# Patient Record
Sex: FEMALE | Race: WHITE | Employment: FULL TIME | URBAN - METROPOLITAN AREA
[De-identification: names, ages, dates, MRNs, and addresses within clinical notes are randomized per-mention and may not be internally consistent; named-entity substitution may affect disease eponyms.]

---

## 2017-02-17 RX ORDER — CETIRIZINE HYDROCHLORIDE 10 MG/1
TABLET ORAL
Qty: 30 TABLET | Refills: 3 | Status: SHIPPED | OUTPATIENT
Start: 2017-02-17 | End: 2017-09-15

## 2017-02-17 NOTE — TELEPHONE ENCOUNTER
Rx approved per  protocol. Chart reviewed. Rx sent to the pharmacy.       Refill Protocol Appointment Criteria  · Appointment scheduled in the past 12 months or in the next 3 months  Recent Visits       Provider Department Primary Dx    5 months ago Leslie

## 2017-04-01 RX ORDER — BUPROPION HYDROCHLORIDE 150 MG/1
TABLET, EXTENDED RELEASE ORAL
Qty: 60 TABLET | Refills: 0 | Status: SHIPPED | OUTPATIENT
Start: 2017-04-01 | End: 2017-07-03

## 2017-04-01 NOTE — TELEPHONE ENCOUNTER
Refill Protocol Appointment Criteria  · Appointment scheduled in the past 6 months or in the next 3 months  Recent Visits       Provider Department Primary Dx    6 months ago Luis M Bernal [place], [address] [facility], [place] [address], [facility] ex

## 2017-06-09 ENCOUNTER — TELEPHONE (OUTPATIENT)
Dept: FAMILY MEDICINE CLINIC | Facility: CLINIC | Age: 57
End: 2017-06-09

## 2017-06-09 DIAGNOSIS — Z00.00 ADULT GENERAL MEDICAL EXAM: Primary | ICD-10-CM

## 2017-06-09 DIAGNOSIS — R73.9 HYPERGLYCEMIA: ICD-10-CM

## 2017-06-09 NOTE — TELEPHONE ENCOUNTER
Patient called and stated requesting an order for blood work  Patient is scheduled on 7/3/17 w/ for fu appt

## 2017-07-03 ENCOUNTER — OFFICE VISIT (OUTPATIENT)
Dept: FAMILY MEDICINE CLINIC | Facility: CLINIC | Age: 57
End: 2017-07-03

## 2017-07-03 ENCOUNTER — LAB ENCOUNTER (OUTPATIENT)
Dept: LAB | Age: 57
End: 2017-07-03
Attending: FAMILY MEDICINE
Payer: COMMERCIAL

## 2017-07-03 ENCOUNTER — APPOINTMENT (OUTPATIENT)
Dept: LAB | Age: 57
End: 2017-07-03
Attending: FAMILY MEDICINE
Payer: COMMERCIAL

## 2017-07-03 VITALS
RESPIRATION RATE: 14 BRPM | TEMPERATURE: 100 F | DIASTOLIC BLOOD PRESSURE: 82 MMHG | HEIGHT: 69.5 IN | SYSTOLIC BLOOD PRESSURE: 122 MMHG | BODY MASS INDEX: 35.76 KG/M2 | WEIGHT: 247 LBS | HEART RATE: 87 BPM

## 2017-07-03 DIAGNOSIS — R73.9 HYPERGLYCEMIA: ICD-10-CM

## 2017-07-03 DIAGNOSIS — K62.89 RECTAL PAIN: ICD-10-CM

## 2017-07-03 DIAGNOSIS — G47.00 INSOMNIA, UNSPECIFIED TYPE: ICD-10-CM

## 2017-07-03 DIAGNOSIS — K64.4 EXTERNAL HEMORRHOID, BLEEDING: ICD-10-CM

## 2017-07-03 DIAGNOSIS — E66.01 SEVERE OBESITY (BMI 35.0-35.9 WITH COMORBIDITY) (HCC): ICD-10-CM

## 2017-07-03 DIAGNOSIS — E78.2 MIXED HYPERLIPIDEMIA: Primary | ICD-10-CM

## 2017-07-03 DIAGNOSIS — E78.2 MIXED HYPERLIPIDEMIA: ICD-10-CM

## 2017-07-03 DIAGNOSIS — F41.9 ANXIETY: ICD-10-CM

## 2017-07-03 DIAGNOSIS — Z00.00 ADULT GENERAL MEDICAL EXAM: ICD-10-CM

## 2017-07-03 LAB
ALBUMIN SERPL BCP-MCNC: 4.1 G/DL (ref 3.5–4.8)
ALBUMIN/GLOB SERPL: 1.5 {RATIO} (ref 1–2)
ALP SERPL-CCNC: 67 U/L (ref 32–100)
ALT SERPL-CCNC: 39 U/L (ref 14–54)
ANION GAP SERPL CALC-SCNC: 9 MMOL/L (ref 0–18)
AST SERPL-CCNC: 34 U/L (ref 15–41)
BASOPHILS # BLD: 0.1 K/UL (ref 0–0.2)
BASOPHILS NFR BLD: 1 %
BILIRUB SERPL-MCNC: 0.6 MG/DL (ref 0.3–1.2)
BUN SERPL-MCNC: 18 MG/DL (ref 8–20)
BUN/CREAT SERPL: 15.4 (ref 10–20)
CALCIUM SERPL-MCNC: 9.6 MG/DL (ref 8.5–10.5)
CHLORIDE SERPL-SCNC: 105 MMOL/L (ref 95–110)
CHOLEST SERPL-MCNC: 193 MG/DL (ref 110–200)
CO2 SERPL-SCNC: 30 MMOL/L (ref 22–32)
CREAT SERPL-MCNC: 1.17 MG/DL (ref 0.5–1.5)
EOSINOPHIL # BLD: 0.2 K/UL (ref 0–0.7)
EOSINOPHIL NFR BLD: 2 %
ERYTHROCYTE [DISTWIDTH] IN BLOOD BY AUTOMATED COUNT: 13.5 % (ref 11–15)
GLOBULIN PLAS-MCNC: 2.7 G/DL (ref 2.5–3.7)
GLUCOSE SERPL-MCNC: 121 MG/DL (ref 70–99)
HBA1C MFR BLD: 6.5 % (ref 4–6)
HCT VFR BLD AUTO: 42.4 % (ref 35–48)
HDLC SERPL-MCNC: 46 MG/DL
HGB BLD-MCNC: 14.7 G/DL (ref 12–16)
LDLC SERPL CALC-MCNC: 105 MG/DL (ref 0–99)
LYMPHOCYTES # BLD: 3.2 K/UL (ref 1–4)
LYMPHOCYTES NFR BLD: 33 %
MCH RBC QN AUTO: 33.2 PG (ref 27–32)
MCHC RBC AUTO-ENTMCNC: 34.7 G/DL (ref 32–37)
MCV RBC AUTO: 95.6 FL (ref 80–100)
MONOCYTES # BLD: 0.5 K/UL (ref 0–1)
MONOCYTES NFR BLD: 5 %
NEUTROPHILS # BLD AUTO: 5.6 K/UL (ref 1.8–7.7)
NEUTROPHILS NFR BLD: 59 %
NONHDLC SERPL-MCNC: 147 MG/DL
OSMOLALITY UR CALC.SUM OF ELEC: 301 MOSM/KG (ref 275–295)
PLATELET # BLD AUTO: 206 K/UL (ref 140–400)
PMV BLD AUTO: 9.5 FL (ref 7.4–10.3)
POTASSIUM SERPL-SCNC: 5.1 MMOL/L (ref 3.3–5.1)
PROT SERPL-MCNC: 6.8 G/DL (ref 5.9–8.4)
RBC # BLD AUTO: 4.44 M/UL (ref 3.7–5.4)
SODIUM SERPL-SCNC: 144 MMOL/L (ref 136–144)
TRIGL SERPL-MCNC: 209 MG/DL (ref 1–149)
TSH SERPL-ACNC: 1.13 UIU/ML (ref 0.45–5.33)
WBC # BLD AUTO: 9.6 K/UL (ref 4–11)

## 2017-07-03 PROCEDURE — 93010 ELECTROCARDIOGRAM REPORT: CPT | Performed by: FAMILY MEDICINE

## 2017-07-03 PROCEDURE — 80061 LIPID PANEL: CPT

## 2017-07-03 PROCEDURE — 84443 ASSAY THYROID STIM HORMONE: CPT

## 2017-07-03 PROCEDURE — 85025 COMPLETE CBC W/AUTO DIFF WBC: CPT

## 2017-07-03 PROCEDURE — 99212 OFFICE O/P EST SF 10 MIN: CPT | Performed by: FAMILY MEDICINE

## 2017-07-03 PROCEDURE — 93005 ELECTROCARDIOGRAM TRACING: CPT

## 2017-07-03 PROCEDURE — 36415 COLL VENOUS BLD VENIPUNCTURE: CPT

## 2017-07-03 PROCEDURE — 83036 HEMOGLOBIN GLYCOSYLATED A1C: CPT

## 2017-07-03 PROCEDURE — 99214 OFFICE O/P EST MOD 30 MIN: CPT | Performed by: FAMILY MEDICINE

## 2017-07-03 PROCEDURE — 80053 COMPREHEN METABOLIC PANEL: CPT

## 2017-07-03 RX ORDER — PHENTERMINE HYDROCHLORIDE 15 MG/1
15 CAPSULE ORAL EVERY MORNING
Qty: 30 CAPSULE | Refills: 1 | Status: SHIPPED | OUTPATIENT
Start: 2017-07-03 | End: 2017-08-14

## 2017-07-03 NOTE — PROGRESS NOTES
Patient ID: Teresa Duron is a 56year old female. HPI  Patient presents with:  Hyperlipidemia   she did her labs this morning. She did have elevated sugar. She does not think she eats that much but she has not lost any weight.   She states she has 2 GLAND/CYST  No date: HEMORRHOIDECTOMY  No date:       Comment: at 35 wks         Current Outpatient Prescriptions:  CETIRIZINE 10 MG Oral Tab TAKE 1 TABLET BY MOUTH ONCE DAILY Disp: 30 tablet Rfl: 3   Atorvastatin Calcium 80 MG Oral Tab TAKE 1 TABLET B clearly needs some help. If her EKG is normal she will start phentermine, but not before.   Insomnia, unspecified type  Defers taking medicines for the insomnia or anxiety   Anxiety    External hemorrhoid, bleeding  -     Hydrocortisone Acetate (ANUSOL-HC)

## 2017-07-07 ENCOUNTER — TELEPHONE (OUTPATIENT)
Dept: INTERNAL MEDICINE CLINIC | Facility: CLINIC | Age: 57
End: 2017-07-07

## 2017-08-14 ENCOUNTER — OFFICE VISIT (OUTPATIENT)
Dept: FAMILY MEDICINE CLINIC | Facility: CLINIC | Age: 57
End: 2017-08-14

## 2017-08-14 ENCOUNTER — TELEPHONE (OUTPATIENT)
Dept: FAMILY MEDICINE CLINIC | Facility: CLINIC | Age: 57
End: 2017-08-14

## 2017-08-14 VITALS
HEIGHT: 69.5 IN | HEART RATE: 86 BPM | DIASTOLIC BLOOD PRESSURE: 66 MMHG | TEMPERATURE: 100 F | SYSTOLIC BLOOD PRESSURE: 119 MMHG | WEIGHT: 243 LBS | BODY MASS INDEX: 35.18 KG/M2

## 2017-08-14 DIAGNOSIS — R30.0 DYSURIA: Primary | ICD-10-CM

## 2017-08-14 DIAGNOSIS — N30.00 ACUTE CYSTITIS WITHOUT HEMATURIA: ICD-10-CM

## 2017-08-14 LAB
MULTISTIX LOT#: ABNORMAL NUMERIC
PH, URINE: 5 (ref 4.5–8)
SPECIFIC GRAVITY: 1.01 (ref 1–1.03)
URINE-COLOR: YELLOW

## 2017-08-14 PROCEDURE — 81003 URINALYSIS AUTO W/O SCOPE: CPT | Performed by: FAMILY MEDICINE

## 2017-08-14 PROCEDURE — 99213 OFFICE O/P EST LOW 20 MIN: CPT | Performed by: FAMILY MEDICINE

## 2017-08-14 RX ORDER — CIPROFLOXACIN 500 MG/1
500 TABLET, FILM COATED ORAL 2 TIMES DAILY
Qty: 10 TABLET | Refills: 0 | Status: SHIPPED | OUTPATIENT
Start: 2017-08-14 | End: 2017-08-19

## 2017-08-14 RX ORDER — HYDROCORTISONE ACETATE 25 MG
SUPPOSITORY, RECTAL RECTAL
Refills: 1 | COMMUNITY
Start: 2017-07-29 | End: 2021-07-01

## 2017-08-14 NOTE — TELEPHONE ENCOUNTER
I have not seen her for urinary tract infection and we do see patients for this as we need to make sure that there is nothing else in the urine besides an infection. I work all the way to 7:30 PM today. Have her worked in with me today.

## 2017-08-14 NOTE — TELEPHONE ENCOUNTER
Actions Requested: requesting an antibiotic  Problem: urinary frequency  Onset and Timing: yesterday  Associated Symptoms: urinary urgency, bladder pressure, low back pain, burning/pain when urinating, small amount of urine coming out, bleeding hemorrhoid

## 2017-08-14 NOTE — PROGRESS NOTES
Patient ID: Celi Jewell is a 56year old female.     HPI  Patient presents with:  Painful Urination    Kinjal Negrete RN      []Manual[]Template  []Copied  Actions Requested: requesting an antibiotic  Problem: urinary frequency  Onset and Timing: EAB x 3   • Esophageal reflux    • Hemorrhoids     hemorrhoidectomy   • High cholesterol    • Skin disorder     \"chronic skin problems??\"   • Ulcer ([facility] [address]        Past Surgical History:  No date: BREAST SURGERY Right      Comment: Excisional biopsy-right -     Ciprofloxacin HCl (CIPRO) 500 MG Oral Tab; Take 1 tablet (500 mg total) by mouth 2 (two) times daily.  -     URINALYSIS, AUTO, W/O SCOPE  -     Cancel: URINE CULTURE, ROUTINE; Future  -     URINE CULTURE, ROUTINE  Urine culture sent.   Start ciproflox

## 2017-08-18 ENCOUNTER — TELEPHONE (OUTPATIENT)
Dept: FAMILY MEDICINE CLINIC | Facility: CLINIC | Age: 57
End: 2017-08-18

## 2017-08-18 NOTE — TELEPHONE ENCOUNTER
----- Message from Kianna Tavares DO sent at 8/16/2017  3:01 PM CDT -----  Urine Culture is positive for bacteria and the antibiotic given should work.

## 2017-09-15 DIAGNOSIS — K27.9 PUD (PEPTIC ULCER DISEASE): ICD-10-CM

## 2017-09-15 DIAGNOSIS — E78.2 MIXED HYPERLIPIDEMIA: ICD-10-CM

## 2017-09-20 RX ORDER — ATORVASTATIN CALCIUM 80 MG/1
TABLET, FILM COATED ORAL
Qty: 90 TABLET | Refills: 0 | Status: SHIPPED | OUTPATIENT
Start: 2017-09-20 | End: 2018-02-23

## 2017-09-20 RX ORDER — PANTOPRAZOLE SODIUM 40 MG/1
TABLET, DELAYED RELEASE ORAL
Qty: 90 TABLET | Refills: 0 | Status: SHIPPED | OUTPATIENT
Start: 2017-09-20 | End: 2018-02-23

## 2017-09-20 RX ORDER — CETIRIZINE HYDROCHLORIDE 10 MG/1
TABLET ORAL
Qty: 30 TABLET | Refills: 0 | Status: SHIPPED | OUTPATIENT
Start: 2017-09-20 | End: 2017-11-04

## 2017-09-20 NOTE — TELEPHONE ENCOUNTER
Cholesterol Medications  Protocol Criteria:  · Appointment scheduled in the past 12 months or in the next 3 months  · ALT & LDL on file in the past 12 months  · ALT result < 80  · LDL result <130   Recent Outpatient Visits            1 month ago Dysuria

## 2017-11-06 RX ORDER — CETIRIZINE HYDROCHLORIDE 10 MG/1
TABLET ORAL
Qty: 30 TABLET | Refills: 0 | Status: SHIPPED | OUTPATIENT
Start: 2017-11-06 | End: 2018-02-23

## 2017-11-06 NOTE — TELEPHONE ENCOUNTER
Signed Prescriptions Disp Refills    CETIRIZINE 10 MG Oral Tab 30 tablet 0      Sig: TAKE 1 TABLET BY MOUTH ONCE DAILY        Authorizing Provider: Mariano Campbell        Ordering User: Deedee Dawkins           Refill approved per protocol.

## 2017-11-06 NOTE — TELEPHONE ENCOUNTER
Refill Protocol Appointment Criteria  · Appointment scheduled in the past 12 months or in the next 3 months  Recent Outpatient Visits            2 months ago [address], [address], [place],     Office Visit    4 months ag

## 2018-02-07 ENCOUNTER — TELEPHONE (OUTPATIENT)
Dept: FAMILY MEDICINE CLINIC | Facility: CLINIC | Age: 58
End: 2018-02-07

## 2018-02-07 DIAGNOSIS — Z00.00 ADULT GENERAL MEDICAL EXAM: Primary | ICD-10-CM

## 2018-02-07 DIAGNOSIS — R73.9 HYPERGLYCEMIA: ICD-10-CM

## 2018-02-23 ENCOUNTER — LAB ENCOUNTER (OUTPATIENT)
Dept: LAB | Age: 58
End: 2018-02-23
Attending: FAMILY MEDICINE
Payer: COMMERCIAL

## 2018-02-23 ENCOUNTER — OFFICE VISIT (OUTPATIENT)
Dept: FAMILY MEDICINE CLINIC | Facility: CLINIC | Age: 58
End: 2018-02-23

## 2018-02-23 VITALS
TEMPERATURE: 99 F | SYSTOLIC BLOOD PRESSURE: 118 MMHG | HEART RATE: 76 BPM | WEIGHT: 243 LBS | DIASTOLIC BLOOD PRESSURE: 77 MMHG | BODY MASS INDEX: 36.83 KG/M2 | HEIGHT: 68 IN

## 2018-02-23 DIAGNOSIS — R73.9 HYPERGLYCEMIA: ICD-10-CM

## 2018-02-23 DIAGNOSIS — F32.A DEPRESSIVE DISORDER: ICD-10-CM

## 2018-02-23 DIAGNOSIS — Z00.00 ADULT GENERAL MEDICAL EXAM: Primary | ICD-10-CM

## 2018-02-23 DIAGNOSIS — R53.83 LETHARGY: ICD-10-CM

## 2018-02-23 DIAGNOSIS — G47.00 INSOMNIA, UNSPECIFIED TYPE: ICD-10-CM

## 2018-02-23 DIAGNOSIS — Z12.4 CERVICAL CANCER SCREENING: ICD-10-CM

## 2018-02-23 DIAGNOSIS — Z00.00 ADULT GENERAL MEDICAL EXAM: ICD-10-CM

## 2018-02-23 DIAGNOSIS — Z23 NEED FOR VACCINATION: ICD-10-CM

## 2018-02-23 DIAGNOSIS — J30.89 OTHER ALLERGIC RHINITIS: ICD-10-CM

## 2018-02-23 DIAGNOSIS — F17.200 TOBACCO USE DISORDER: ICD-10-CM

## 2018-02-23 DIAGNOSIS — F41.9 ANXIETY: ICD-10-CM

## 2018-02-23 DIAGNOSIS — Z12.31 VISIT FOR SCREENING MAMMOGRAM: ICD-10-CM

## 2018-02-23 DIAGNOSIS — K63.5 POLYP OF COLON, UNSPECIFIED PART OF COLON, UNSPECIFIED TYPE: ICD-10-CM

## 2018-02-23 DIAGNOSIS — E78.2 MIXED HYPERLIPIDEMIA: ICD-10-CM

## 2018-02-23 DIAGNOSIS — E66.01 SEVERE OBESITY (BMI 35.0-35.9 WITH COMORBIDITY) (HCC): ICD-10-CM

## 2018-02-23 DIAGNOSIS — K27.9 PUD (PEPTIC ULCER DISEASE): ICD-10-CM

## 2018-02-23 LAB
ALBUMIN SERPL BCP-MCNC: 3.9 G/DL (ref 3.5–4.8)
ALBUMIN/GLOB SERPL: 1.3 {RATIO} (ref 1–2)
ALP SERPL-CCNC: 64 U/L (ref 32–100)
ALT SERPL-CCNC: 43 U/L (ref 14–54)
ANION GAP SERPL CALC-SCNC: 9 MMOL/L (ref 0–18)
AST SERPL-CCNC: 36 U/L (ref 15–41)
BASOPHILS # BLD: 0.1 K/UL (ref 0–0.2)
BASOPHILS NFR BLD: 1 %
BILIRUB SERPL-MCNC: 0.7 MG/DL (ref 0.3–1.2)
BUN SERPL-MCNC: 16 MG/DL (ref 8–20)
BUN/CREAT SERPL: 14.2 (ref 10–20)
CALCIUM SERPL-MCNC: 9.7 MG/DL (ref 8.5–10.5)
CHLORIDE SERPL-SCNC: 104 MMOL/L (ref 95–110)
CHOLEST SERPL-MCNC: 175 MG/DL (ref 110–200)
CO2 SERPL-SCNC: 28 MMOL/L (ref 22–32)
CREAT SERPL-MCNC: 1.13 MG/DL (ref 0.5–1.5)
EOSINOPHIL # BLD: 0.3 K/UL (ref 0–0.7)
EOSINOPHIL NFR BLD: 3 %
ERYTHROCYTE [DISTWIDTH] IN BLOOD BY AUTOMATED COUNT: 13 % (ref 11–15)
GLOBULIN PLAS-MCNC: 2.9 G/DL (ref 2.5–3.7)
GLUCOSE SERPL-MCNC: 125 MG/DL (ref 70–99)
HBA1C MFR BLD: 6.5 % (ref 4–6)
HCT VFR BLD AUTO: 44.2 % (ref 35–48)
HDLC SERPL-MCNC: 41 MG/DL
HGB BLD-MCNC: 15.1 G/DL (ref 12–16)
LDLC SERPL CALC-MCNC: 101 MG/DL (ref 0–99)
LYMPHOCYTES # BLD: 3.3 K/UL (ref 1–4)
LYMPHOCYTES NFR BLD: 32 %
MCH RBC QN AUTO: 32.9 PG (ref 27–32)
MCHC RBC AUTO-ENTMCNC: 34.2 G/DL (ref 32–37)
MCV RBC AUTO: 96.2 FL (ref 80–100)
MONOCYTES # BLD: 0.6 K/UL (ref 0–1)
MONOCYTES NFR BLD: 6 %
NEUTROPHILS # BLD AUTO: 6.1 K/UL (ref 1.8–7.7)
NEUTROPHILS NFR BLD: 59 %
NONHDLC SERPL-MCNC: 134 MG/DL
OSMOLALITY UR CALC.SUM OF ELEC: 295 MOSM/KG (ref 275–295)
PATIENT FASTING: YES
PLATELET # BLD AUTO: 241 K/UL (ref 140–400)
PMV BLD AUTO: 9.7 FL (ref 7.4–10.3)
POTASSIUM SERPL-SCNC: 4.4 MMOL/L (ref 3.3–5.1)
PROT SERPL-MCNC: 6.8 G/DL (ref 5.9–8.4)
RBC # BLD AUTO: 4.6 M/UL (ref 3.7–5.4)
SODIUM SERPL-SCNC: 141 MMOL/L (ref 136–144)
TRIGL SERPL-MCNC: 166 MG/DL (ref 1–149)
TSH SERPL-ACNC: 1.68 UIU/ML (ref 0.45–5.33)
WBC # BLD AUTO: 10.3 K/UL (ref 4–11)

## 2018-02-23 PROCEDURE — 84443 ASSAY THYROID STIM HORMONE: CPT

## 2018-02-23 PROCEDURE — 99406 BEHAV CHNG SMOKING 3-10 MIN: CPT | Performed by: FAMILY MEDICINE

## 2018-02-23 PROCEDURE — 83036 HEMOGLOBIN GLYCOSYLATED A1C: CPT

## 2018-02-23 PROCEDURE — 80061 LIPID PANEL: CPT

## 2018-02-23 PROCEDURE — 80053 COMPREHEN METABOLIC PANEL: CPT

## 2018-02-23 PROCEDURE — 85025 COMPLETE CBC W/AUTO DIFF WBC: CPT

## 2018-02-23 PROCEDURE — 99214 OFFICE O/P EST MOD 30 MIN: CPT | Performed by: FAMILY MEDICINE

## 2018-02-23 PROCEDURE — 36415 COLL VENOUS BLD VENIPUNCTURE: CPT

## 2018-02-23 PROCEDURE — 99396 PREV VISIT EST AGE 40-64: CPT | Performed by: FAMILY MEDICINE

## 2018-02-23 RX ORDER — CETIRIZINE HYDROCHLORIDE 10 MG/1
10 TABLET ORAL
Qty: 90 TABLET | Refills: 1 | Status: SHIPPED | OUTPATIENT
Start: 2018-02-23 | End: 2018-03-20

## 2018-02-23 RX ORDER — ATORVASTATIN CALCIUM 80 MG/1
TABLET, FILM COATED ORAL
Qty: 90 TABLET | Refills: 1 | Status: SHIPPED | OUTPATIENT
Start: 2018-02-23 | End: 2018-03-20

## 2018-02-23 RX ORDER — BUPROPION HYDROCHLORIDE 150 MG/1
150 TABLET, EXTENDED RELEASE ORAL 2 TIMES DAILY
Qty: 60 TABLET | Refills: 3 | Status: SHIPPED | OUTPATIENT
Start: 2018-02-23 | End: 2018-03-19

## 2018-02-23 RX ORDER — PANTOPRAZOLE SODIUM 40 MG/1
TABLET, DELAYED RELEASE ORAL
Qty: 90 TABLET | Refills: 1 | Status: SHIPPED | OUTPATIENT
Start: 2018-02-23 | End: 2018-03-20

## 2018-02-23 RX ORDER — ESCITALOPRAM OXALATE 10 MG/1
10 TABLET ORAL DAILY
Qty: 90 TABLET | Refills: 0 | Status: SHIPPED | OUTPATIENT
Start: 2018-02-23 | End: 2018-05-22

## 2018-02-23 NOTE — PROGRESS NOTES
Patient ID: Guadalupe Daniels is a 66year old female. HPI  Patient presents with:  Routine Physical    She had her labs already done today. She does have a stressful life and states she is a single mom.   She works 2 jobs and works more than 60 hours per Results  Component Value Date    (H) 07/03/2017   BUN 18 07/03/2017   BUNCREA 15.4 07/03/2017   CREATSERUM 1.17 07/03/2017   ANIONGAP 9 07/03/2017   GFRNAA 48 (L) 07/03/2017   GFRAA 58 (L) 07/03/2017   CA 9.6 07/03/2017   OSMOCALC 301 (H) 07/03/2017 ----------  TSH (S) (uIU/mL)   Date Value   09/06/2016 1.84   ----------    No results found for: B12, VITB12    No results found for: IRON, IRONTOT    No results found for: SAT    No results found for: IRON, IRONTOT, TIBC, IRONSAT, TRANSFERRIN, TIBCP, I Currently     Other Topics Concern    Pt has a pacemaker No    Reaction to local anesthetic No    Caffeine Concern No     Social History Narrative   None on file            Current Outpatient Prescriptions:  Glucosamine HCl (GLUCOSAMINE OR) Take by mouth. has normal reflexes. No cranial nerve deficit. Skin: Skin is warm and dry. No rash noted. Psychiatric: She has a anxious affect but no depression or suicidal ideations at this time. No hallucinations. No edema the legs. Vitals reviewed.     Blood pre again as it did work for her in the past.  I did discuss tobacco cessation for about 3 minutes. Depressive disorder  -     escitalopram 10 MG Oral Tab; Take 1 tablet (10 mg total) by mouth daily. For mood    Lethargy  -     escitalopram 10 MG Oral Tab;  Ta

## 2018-02-27 ENCOUNTER — TELEPHONE (OUTPATIENT)
Dept: OTHER | Age: 58
End: 2018-02-27

## 2018-02-27 NOTE — TELEPHONE ENCOUNTER
Patient informed of results, and given MD's recommendations. Patient verbalized understanding with intent to comply. Questions answered.     Patient states she tries to decrease carbs, Reviewed diet modification such as reducing carbohydrates, fatty foods a

## 2018-02-27 NOTE — TELEPHONE ENCOUNTER
----- Message from Kimberly Altamirano DO sent at 2/25/2018  3:24 PM CST -----  Hemoglobin A1c is at 6.5 so you are borderline diabetic. Your cholesterol is also a bit high due to the diabetes then.   Kidney function, liver function, thyroid are normal.  No ane

## 2018-03-19 ENCOUNTER — OFFICE VISIT (OUTPATIENT)
Dept: OBGYN CLINIC | Facility: CLINIC | Age: 58
End: 2018-03-19

## 2018-03-19 VITALS
SYSTOLIC BLOOD PRESSURE: 123 MMHG | DIASTOLIC BLOOD PRESSURE: 76 MMHG | BODY MASS INDEX: 37.85 KG/M2 | HEART RATE: 90 BPM | HEIGHT: 67.5 IN | WEIGHT: 244 LBS

## 2018-03-19 DIAGNOSIS — Z01.419 ENCOUNTER FOR GYNECOLOGICAL EXAMINATION WITHOUT ABNORMAL FINDING: Primary | ICD-10-CM

## 2018-03-19 DIAGNOSIS — Z12.31 VISIT FOR SCREENING MAMMOGRAM: ICD-10-CM

## 2018-03-19 PROCEDURE — 99396 PREV VISIT EST AGE 40-64: CPT | Performed by: OBSTETRICS & GYNECOLOGY

## 2018-03-20 ENCOUNTER — TELEPHONE (OUTPATIENT)
Dept: FAMILY MEDICINE CLINIC | Facility: CLINIC | Age: 58
End: 2018-03-20

## 2018-03-20 DIAGNOSIS — K27.9 PUD (PEPTIC ULCER DISEASE): ICD-10-CM

## 2018-03-20 DIAGNOSIS — E78.2 MIXED HYPERLIPIDEMIA: ICD-10-CM

## 2018-03-20 DIAGNOSIS — J30.89 OTHER ALLERGIC RHINITIS: ICD-10-CM

## 2018-03-20 LAB
HPV I/H RISK 1 DNA SPEC QL NAA+PROBE: NEGATIVE
LAST PAP RESULT: NORMAL

## 2018-03-20 NOTE — TELEPHONE ENCOUNTER
Pt is requesting if Dr. John Layne resend all of her scripts that he refill for pt in feb[facility] from now on.        Please advise     Pt doesn't know which location pt said  would know he e-mail them out last nicky

## 2018-03-20 NOTE — TELEPHONE ENCOUNTER
Pt called and states she needs refills only for   cetirizine 10 MG Oral Tab  atorvastatin 80 MG Oral Tab  Pantoprazole Sodium 40 MG Oral Tab EC  Pt requesting [facility] supply [facility] updated in epic

## 2018-03-21 RX ORDER — ATORVASTATIN CALCIUM 80 MG/1
TABLET, FILM COATED ORAL
Qty: 90 TABLET | Refills: 0 | Status: SHIPPED | OUTPATIENT
Start: 2018-03-21 | End: 2018-06-08

## 2018-03-21 RX ORDER — PANTOPRAZOLE SODIUM 40 MG/1
TABLET, DELAYED RELEASE ORAL
Qty: 90 TABLET | Refills: 0 | Status: SHIPPED | OUTPATIENT
Start: 2018-03-21 | End: 2018-06-08

## 2018-03-21 RX ORDER — CETIRIZINE HYDROCHLORIDE 10 MG/1
10 TABLET ORAL
Qty: 90 TABLET | Refills: 0 | Status: SHIPPED | OUTPATIENT
Start: 2018-03-21 | End: 2018-06-08

## 2018-03-21 NOTE — PROGRESS NOTES
Nolene Phalen is a 66year old female [id number] No LMP recorded. Patient is postmenopausal. Patient presents with:  Gyn Exam: ANNUAL EXAM. last seen in 2012 w/ bartholin cyst removal. xs stress due to financial issues. Last pap & mamogram in 2012  .     OBST Currently     Other Topics Concern    Pt has a pacemaker No    Reaction to local anesthetic No    Caffeine Concern No     Social History Narrative   None on file       FAMILY HISTORY:  Family History   Problem Relation Age of Onset   • Hypertension Mother extremity weakness or numbness. Psychiatric:   denies depression or anxiety. Endocrine:     denies excessive thirst or urination. Heme/Lymph:    denies history of anemia, easy bruising or bleeding.       PHYSICAL EXAM:   /76   Pulse 90   Ht 5' 7.5\ mammograms encouraged with annual MD breast exam. SBE encouraged. Call if any vaginal bleeding. Encouraged 1500 mg calcium w/ vit D. Encouraged weight bearing exercise. Follow-up in one year. Declines STD screen.

## 2018-03-26 ENCOUNTER — HOSPITAL ENCOUNTER (OUTPATIENT)
Dept: MAMMOGRAPHY | Age: 58
Discharge: HOME | End: 2018-03-26
Attending: FAMILY MEDICINE
Payer: COMMERCIAL

## 2018-03-26 DIAGNOSIS — Z12.31 VISIT FOR SCREENING MAMMOGRAM: ICD-10-CM

## 2018-03-26 PROCEDURE — 77067 SCR MAMMO BI INCL CAD: CPT | Performed by: FAMILY MEDICINE

## 2018-05-19 ENCOUNTER — HOSPITAL ENCOUNTER (OUTPATIENT)
Age: 58
Discharge: HOME | End: 2018-05-19
Attending: FAMILY MEDICINE
Payer: COMMERCIAL

## 2018-05-19 VITALS
HEART RATE: 88 BPM | BODY MASS INDEX: 38 KG/M2 | DIASTOLIC BLOOD PRESSURE: 79 MMHG | RESPIRATION RATE: 16 BRPM | TEMPERATURE: 99 F | WEIGHT: 243 LBS | OXYGEN SATURATION: 96 % | SYSTOLIC BLOOD PRESSURE: 136 MMHG

## 2018-05-19 DIAGNOSIS — J06.9 VIRAL UPPER RESPIRATORY TRACT INFECTION: Primary | ICD-10-CM

## 2018-05-19 DIAGNOSIS — F17.200 SMOKER: ICD-10-CM

## 2018-05-19 PROCEDURE — 87430 STREP A AG IA: CPT

## 2018-05-19 PROCEDURE — 99213 OFFICE O/P EST LOW 20 MIN: CPT

## 2018-05-19 PROCEDURE — 99204 OFFICE O/P NEW MOD 45 MIN: CPT

## 2018-05-19 RX ORDER — BENZONATATE 100 MG/1
100 CAPSULE ORAL 3 TIMES DAILY PRN
Qty: 30 CAPSULE | Refills: 0 | Status: SHIPPED | OUTPATIENT
Start: 2018-05-19 | End: 2018-06-18

## 2018-05-19 NOTE — ED PROVIDER NOTES
Patient Seen in: [facility] Immediate Care In [address] [place] [address]    History   Patient presents with:  Sore Throat  Cough/URI    Stated Complaint: sore throat/cough    HPI    Patient here with a cough and  sore throat for 1  days.   No travel,no sick contacts Smoking status: Current Every Day Smoker                                                   Packs/day: 0.50      Years: 0.00         Types: Cigarettes  Smokeless tobacco: Former User                     Comment: hx: quit 2007, currently using vapor  A taking these medications    benzonatate 100 MG Oral Cap  Take 1 capsule (100 mg total) by mouth 3 (three) times daily as needed for cough.   Qty: 30 capsule Refills: 0

## 2018-05-22 ENCOUNTER — OFFICE VISIT (OUTPATIENT)
Dept: FAMILY MEDICINE CLINIC | Facility: CLINIC | Age: 58
End: 2018-05-22

## 2018-05-22 ENCOUNTER — NURSE TRIAGE (OUTPATIENT)
Dept: OTHER | Age: 58
End: 2018-05-22

## 2018-05-22 VITALS
BODY MASS INDEX: 35.61 KG/M2 | SYSTOLIC BLOOD PRESSURE: 110 MMHG | HEART RATE: 88 BPM | TEMPERATURE: 99 F | DIASTOLIC BLOOD PRESSURE: 70 MMHG | HEIGHT: 68 IN | WEIGHT: 235 LBS

## 2018-05-22 DIAGNOSIS — M79.10 MYALGIA: ICD-10-CM

## 2018-05-22 DIAGNOSIS — F32.A DEPRESSIVE DISORDER: ICD-10-CM

## 2018-05-22 DIAGNOSIS — R50.9 FEVER, UNSPECIFIED FEVER CAUSE: ICD-10-CM

## 2018-05-22 DIAGNOSIS — R05.9 COUGH: Primary | ICD-10-CM

## 2018-05-22 DIAGNOSIS — F41.9 ANXIETY: ICD-10-CM

## 2018-05-22 PROCEDURE — 99212 OFFICE O/P EST SF 10 MIN: CPT | Performed by: FAMILY MEDICINE

## 2018-05-22 PROCEDURE — 99215 OFFICE O/P EST HI 40 MIN: CPT | Performed by: FAMILY MEDICINE

## 2018-05-22 RX ORDER — ALBUTEROL SULFATE 90 UG/1
2 AEROSOL, METERED RESPIRATORY (INHALATION) EVERY 6 HOURS PRN
Qty: 1 INHALER | Refills: 0 | Status: SHIPPED | OUTPATIENT
Start: 2018-05-22 | End: 2019-03-01

## 2018-05-22 RX ORDER — VENLAFAXINE HYDROCHLORIDE 75 MG/1
75 CAPSULE, EXTENDED RELEASE ORAL DAILY
Qty: 30 CAPSULE | Refills: 1 | Status: SHIPPED | OUTPATIENT
Start: 2018-05-22 | End: 2018-08-21

## 2018-05-22 RX ORDER — PROMETHAZINE HYDROCHLORIDE AND CODEINE PHOSPHATE 6.25; 1 MG/5ML; MG/5ML
5 SYRUP ORAL EVERY 6 HOURS PRN
Qty: 180 ML | Refills: 0 | Status: SHIPPED | OUTPATIENT
Start: 2018-05-22 | End: 2018-10-19 | Stop reason: ALTCHOICE

## 2018-05-22 RX ORDER — AZITHROMYCIN 250 MG/1
TABLET, FILM COATED ORAL
Qty: 6 TABLET | Refills: 0 | Status: SHIPPED | OUTPATIENT
Start: 2018-05-22 | End: 2018-05-27

## 2018-05-22 NOTE — TELEPHONE ENCOUNTER
Action Requested: Summary for Provider     []  Critical Lab, Recommendations Needed  [] Need Additional Advice  []   FYI    []   Need Orders  [] Need Medications Sent to Pharmacy  []  Other     SUMMARY: Pt stated that her s/sx started Friday night.  She had

## 2018-05-22 NOTE — PROGRESS NOTES
Patient ID: Isabel Gutierrez is a 66year old female. HPI  Patient presents with:  Cough: started Sat     On 5/19/2018 she went to the immediate care due to a cough and sore throat for 1 day. No travel or sick contacts.   She was able to tolerate secreti Cardiovascular: Negative for chest pain. Gastrointestinal: Positive for abdominal pain (sore from coughing. ).          Past Medical History:   Diagnosis Date   • Anxiety state, unspecified    • Arthritis    • Asthma    • Borderline diabetes    • Decorat oriented to person, place, and time. Patient appears well-developed and well-nourished. No distress. HENT:   Head: Normocephalic.    Right Ear: Tympanic membrane, external ear and ear canal normal.   Left Ear: Tympanic membrane, external ear and ear canal Wheezing or Shortness of Breath.  -     azithromycin (ZITHROMAX Z-JUAN A) 250 MG Oral Tab; Take 2 tabs by mouth on day 1 and then 1 tab by mouth each day for the next 4 days for 5 days of total treatment.   -     promethazine-codeine 6.25-10 MG/5ML Oral Syrup; placed in this encounter. Follow up if symptoms persist.  Take medicine (if given) as prescribed. Approach to treatment discussed and patient/family member understands and agrees to plan.        [address], DO  5/22/2018

## 2018-06-08 DIAGNOSIS — E78.2 MIXED HYPERLIPIDEMIA: ICD-10-CM

## 2018-06-08 DIAGNOSIS — J30.89 OTHER ALLERGIC RHINITIS: ICD-10-CM

## 2018-06-08 DIAGNOSIS — K27.9 PUD (PEPTIC ULCER DISEASE): ICD-10-CM

## 2018-06-09 RX ORDER — ATORVASTATIN CALCIUM 80 MG/1
TABLET, FILM COATED ORAL
Qty: 90 TABLET | Refills: 0 | Status: SHIPPED | OUTPATIENT
Start: 2018-06-09 | End: 2018-09-24

## 2018-06-09 RX ORDER — CETIRIZINE HYDROCHLORIDE 10 MG/1
TABLET ORAL
Qty: 90 TABLET | Refills: 0 | Status: SHIPPED | OUTPATIENT
Start: 2018-06-09 | End: 2018-09-24

## 2018-06-09 RX ORDER — PANTOPRAZOLE SODIUM 40 MG/1
TABLET, DELAYED RELEASE ORAL
Qty: 90 TABLET | Refills: 0 | Status: SHIPPED | OUTPATIENT
Start: 2018-06-09 | End: 2018-09-24

## 2018-06-09 NOTE — TELEPHONE ENCOUNTER
Refill passed per [facility] protocol.     Refill Protocol Appointment Criteria Allergy Medication:    · Appointment scheduled in the past 12 months or in the next 3 months  Recent Outpatient Visits            2 weeks ago Cough    [address] Outpatient Visits            2 weeks ago Cough    [address], [place]    Office Visit    2 months ago Encounter for gynecological examination without abnormal finding    [facility] [place], [address] [place] [address]

## 2018-06-30 NOTE — TELEPHONE ENCOUNTER
Great.
[facility] transfer to [phone].     EKG of your heart is normal.  Go ahead and start the phentermine every day and then I will see you in 1 month so we can see if you are on the right dose and if it is help curb her appetite and help to lose weight    Notes Record
Verified name and . Results/recommendations reviewed with pt and pt verb understanding. Pt will call back to make f/u appointment with Dr Sandra Hughes.
Instructed patient/caregiver to follow-up with primary care physician./Elevate the injured extremity as instructed./Verbal/written post procedure instructions were given to patient/caregiver./Instructed patient/caregiver regarding signs and symptoms of infection./Keep the cast/splint/dressing clean and dry.

## 2018-08-21 DIAGNOSIS — F32.A DEPRESSIVE DISORDER: ICD-10-CM

## 2018-08-21 DIAGNOSIS — F41.9 ANXIETY: ICD-10-CM

## 2018-08-21 RX ORDER — VENLAFAXINE HYDROCHLORIDE 75 MG/1
CAPSULE, EXTENDED RELEASE ORAL
Qty: 90 CAPSULE | Refills: 0 | Status: SHIPPED | OUTPATIENT
Start: 2018-08-21 | End: 2018-10-19 | Stop reason: ALTCHOICE

## 2018-08-21 NOTE — TELEPHONE ENCOUNTER
Refill Protocol Appointment Criteria  · Appointment scheduled in the past 6 months or in the next 3 months  Recent Outpatient Visits            3 months ago Cough    [address], [place],     Office Visit    5 months ago E

## 2018-09-24 DIAGNOSIS — E78.2 MIXED HYPERLIPIDEMIA: ICD-10-CM

## 2018-09-24 DIAGNOSIS — K27.9 PUD (PEPTIC ULCER DISEASE): ICD-10-CM

## 2018-09-24 DIAGNOSIS — J30.89 OTHER ALLERGIC RHINITIS: ICD-10-CM

## 2018-09-25 RX ORDER — CETIRIZINE HYDROCHLORIDE 10 MG/1
TABLET ORAL
Qty: 90 TABLET | Refills: 0 | Status: SHIPPED | OUTPATIENT
Start: 2018-09-25 | End: 2019-02-21

## 2018-09-25 RX ORDER — ATORVASTATIN CALCIUM 80 MG/1
TABLET, FILM COATED ORAL
Qty: 90 TABLET | Refills: 0 | Status: SHIPPED | OUTPATIENT
Start: 2018-09-25 | End: 2019-02-21

## 2018-09-25 RX ORDER — PANTOPRAZOLE SODIUM 40 MG/1
TABLET, DELAYED RELEASE ORAL
Qty: 90 TABLET | Refills: 0 | Status: SHIPPED | OUTPATIENT
Start: 2018-09-25 | End: 2019-02-21

## 2018-09-25 NOTE — TELEPHONE ENCOUNTER
Refilled per [facility] [place] [facility] Protocol.    Cholesterol Medications  Protocol Criteria:  · Appointment scheduled in the past 12 months or in the next 3 months  · ALT & LDL on file in the past 12 months  · ALT result < 80  · LDL result <130   Recent Outpatient

## 2018-10-19 ENCOUNTER — OFFICE VISIT (OUTPATIENT)
Dept: DERMATOLOGY CLINIC | Facility: CLINIC | Age: 58
End: 2018-10-19
Payer: COMMERCIAL

## 2018-10-19 DIAGNOSIS — D23.30 BENIGN NEOPLASM OF SKIN OF FACE: ICD-10-CM

## 2018-10-19 DIAGNOSIS — D22.9 MULTIPLE NEVI: ICD-10-CM

## 2018-10-19 DIAGNOSIS — D23.60 BENIGN NEOPLASM OF SKIN OF UPPER LIMB, INCLUDING SHOULDER, UNSPECIFIED LATERALITY: ICD-10-CM

## 2018-10-19 DIAGNOSIS — D23.70 BENIGN NEOPLASM OF SKIN OF LOWER LIMB, INCLUDING HIP, UNSPECIFIED LATERALITY: ICD-10-CM

## 2018-10-19 DIAGNOSIS — D23.4 BENIGN NEOPLASM OF SCALP AND SKIN OF NECK: ICD-10-CM

## 2018-10-19 DIAGNOSIS — Z80.8 FAMILY HISTORY OF MELANOMA: ICD-10-CM

## 2018-10-19 DIAGNOSIS — D48.5 NEOPLASM OF UNCERTAIN BEHAVIOR OF SKIN: Primary | ICD-10-CM

## 2018-10-19 DIAGNOSIS — D23.5 BENIGN NEOPLASM OF SKIN OF TRUNK, EXCEPT SCROTUM: ICD-10-CM

## 2018-10-19 PROCEDURE — 88305 TISSUE EXAM BY PATHOLOGIST: CPT | Performed by: DERMATOLOGY

## 2018-10-19 PROCEDURE — 99203 OFFICE O/P NEW LOW 30 MIN: CPT | Performed by: DERMATOLOGY

## 2018-10-19 PROCEDURE — 11100 BIOPSY OF SKIN LESION: CPT | Performed by: DERMATOLOGY

## 2018-10-22 ENCOUNTER — TELEPHONE (OUTPATIENT)
Dept: DERMATOLOGY CLINIC | Facility: CLINIC | Age: 58
End: 2018-10-22

## 2018-10-23 NOTE — PROGRESS NOTES
The pathology report from last visit showed  Right posterior shoulder-0.5mm pt aware to schedule appointment with Dr. Enrique Mckeon in gen surg and f/u in 3-4 mos. Please log in test results.   Please call patient and inform of results and recommendations.  (plea

## 2018-10-23 NOTE — PROGRESS NOTES
Called patient. KMT gave results and recommendations yesterday. Patient has Dr. Lacie Coronado phone #. She will cb to make f/u.  Logged and in [facility]

## 2018-10-28 NOTE — PROGRESS NOTES
John Raymond is a 66year old female. HPI:     CC:  Patient presents with:  Lesion: LOV: 7-7-14. Pt presents today with dark lesion to R shoulder and requesting full body skin exam. Pt denies a personal hx of SC. Son with hx of MM.          Allergies:  P Multiple Vitamins-Minerals (MULTIVITAMIN ADULT OR) Take by mouth. Disp:  Rfl:    Coenzyme Q10 (CO Q 10 OR) Take by mouth. Disp:  Rfl:    Misc Natural Products (GLUCOSAMINE CHOND COMPLEX/MSM OR) Take by mouth.  Disp:  Rfl:    Docusate Sodium (STOOL SOFTENE History of tanning: Not Asked        Outdoor occupation: Not Asked        Pt has a pacemaker: No        Pt has a defibrillator: Not Asked        Breast feeding: Not Asked        Reaction to local anesthetic: No         Service: Not Asked        Blo palms.     Multiple light to medium brown, well marginated, uniformly pigmented, macules and papules 6 mm and less scattered on exam. pigmented lesions examined with dermoscopy benign-appearing patterns.      Waxy tannish keratotic papules scattered, cherry discussed. Pathophysiology discussed with patient. Therapeutic options reviewed. See  Medications in grid. Instructions reviewed at length. Benign nevi, seborrheic  keratoses, cherry angiomas:  Reassurance regarding other benign skin lesions. Signs and

## 2018-10-28 NOTE — PROGRESS NOTES
Operative Report                                                                      Punch Biopsy      Clinical diagnosis:  Size of lesion:   Location: Diagnosis/Clinical History: p

## 2018-11-02 ENCOUNTER — OFFICE VISIT (OUTPATIENT)
Dept: SURGERY | Facility: CLINIC | Age: 58
End: 2018-11-02
Payer: COMMERCIAL

## 2018-11-02 VITALS — WEIGHT: 230 LBS | HEIGHT: 69 IN | BODY MASS INDEX: 34.07 KG/M2

## 2018-11-02 DIAGNOSIS — C43.61 MELANOMA OF SHOULDER, RIGHT (HCC): Primary | ICD-10-CM

## 2018-11-02 PROCEDURE — 99212 OFFICE O/P EST SF 10 MIN: CPT | Performed by: SURGERY

## 2018-11-02 PROCEDURE — 99204 OFFICE O/P NEW MOD 45 MIN: CPT | Performed by: SURGERY

## 2018-11-02 RX ORDER — VENLAFAXINE HYDROCHLORIDE 75 MG/1
75 CAPSULE, EXTENDED RELEASE ORAL DAILY
Refills: 0 | COMMUNITY
Start: 2018-10-26 | End: 2019-03-01

## 2018-11-02 NOTE — H&P
History and Physical      Silvia Sun is a 66year old female. HPI   Patient presents with:  Cancer: Invasive Melanoma Right Shoulder. First noticed 2 months ago. Biopsy done 2 weeks ago. Here to discuss surgery.   Sutures to right shoulders in p CHOND COMPLEX/MSM OR) Take by mouth. Disp:  Rfl:    Docusate Sodium (STOOL SOFTENER OR) Take by mouth. Disp:  Rfl:    Venlafaxine HCl ER 75 MG Oral Capsule SR 24 Hr Take 75 mg by mouth daily.  Disp:  Rfl: 0     ALLERGIES  No Known Allergies    Social Histor no edema, cyanosis, or clubbing  Neurological: exam appropriate for age reflexes and motor skills appropriate for age   Skin: healing one cm incision R posterior shoulder, no cervical or L ax LN, scattered nevi and keratoses.   Previous 10x8mm irregular pig

## 2018-11-08 ENCOUNTER — LAB REQUISITION (OUTPATIENT)
Dept: LAB | Facility: HOSPITAL | Age: 58
End: 2018-11-08
Payer: COMMERCIAL

## 2018-11-08 DIAGNOSIS — Z01.89 ENCOUNTER FOR OTHER SPECIFIED SPECIAL EXAMINATIONS: ICD-10-CM

## 2018-11-08 PROCEDURE — 88342 IMHCHEM/IMCYTCHM 1ST ANTB: CPT | Performed by: SURGERY

## 2018-11-08 PROCEDURE — 88305 TISSUE EXAM BY PATHOLOGIST: CPT | Performed by: SURGERY

## 2018-11-09 ENCOUNTER — TELEPHONE (OUTPATIENT)
Dept: SURGERY | Facility: CLINIC | Age: 58
End: 2018-11-09

## 2018-11-09 ENCOUNTER — HOSPITAL ENCOUNTER (EMERGENCY)
Facility: HOSPITAL | Age: 58
Discharge: HOME | End: 2018-11-09
Attending: EMERGENCY MEDICINE
Payer: COMMERCIAL

## 2018-11-09 ENCOUNTER — OFFICE VISIT (OUTPATIENT)
Dept: SURGERY | Facility: CLINIC | Age: 58
End: 2018-11-09
Payer: COMMERCIAL

## 2018-11-09 VITALS
RESPIRATION RATE: 17 BRPM | OXYGEN SATURATION: 97 % | DIASTOLIC BLOOD PRESSURE: 76 MMHG | TEMPERATURE: 98 F | SYSTOLIC BLOOD PRESSURE: 148 MMHG | BODY MASS INDEX: 35.55 KG/M2 | WEIGHT: 240 LBS | HEIGHT: 69 IN | HEART RATE: 75 BPM

## 2018-11-09 VITALS — WEIGHT: 240 LBS | BODY MASS INDEX: 35 KG/M2

## 2018-11-09 DIAGNOSIS — C43.59 MALIGNANT MELANOMA OF UPPER BACK (HCC): Primary | ICD-10-CM

## 2018-11-09 DIAGNOSIS — Z48.89 ENCOUNTER FOR POST SURGICAL WOUND CHECK: Primary | ICD-10-CM

## 2018-11-09 PROCEDURE — 99212 OFFICE O/P EST SF 10 MIN: CPT | Performed by: SURGERY

## 2018-11-09 PROCEDURE — 99024 POSTOP FOLLOW-UP VISIT: CPT | Performed by: SURGERY

## 2018-11-09 PROCEDURE — 99283 EMERGENCY DEPT VISIT LOW MDM: CPT

## 2018-11-09 RX ORDER — TRAMADOL HYDROCHLORIDE 50 MG/1
50 TABLET ORAL ONCE
Status: COMPLETED | OUTPATIENT
Start: 2018-11-09 | End: 2018-11-09

## 2018-11-09 RX ORDER — TRAMADOL HYDROCHLORIDE 50 MG/1
50 TABLET ORAL EVERY 12 HOURS PRN
Qty: 6 TABLET | Refills: 0 | Status: SHIPPED | OUTPATIENT
Start: 2018-11-09 | End: 2018-11-12

## 2018-11-09 NOTE — ED PROVIDER NOTES
Patient Seen in: [facility] Emergency Department    History   Patient presents with:  Postop/Procedure Problem    Stated Complaint: bleeding+ swelling to surgical site at right scapular area     HPI    65 yo F with PMH HL and now POD 1 from right sc Cancer Maternal Grandmother         ?liver   • Cancer Son         malignant melanoma   • Diabetes Neg        Social History    Tobacco Use      Smoking status: Current Every Day Smoker        Packs/day: 0.50        Types: Cigarettes      Smokeless tobacco: steri-strips/benzoin, cyanoacrylate. The procedure was performed by myself. MDM     DIFFERENTIAL DIAGNOSIS: After history and physical exam differential diagnosis includes but is not limited to wound evaluation, abscess, dehisence.     Pulse ox: 97%:N

## 2018-11-09 NOTE — TELEPHONE ENCOUNTER
Called patient to inform her the doctor would like to see her this am.  Left message to call back on home and work phone.

## 2018-11-09 NOTE — TELEPHONE ENCOUNTER
Pt. is having swelling at surgery site. Pt. also says that she was bleeding and had to go to ER last night. I transferred the call to General Surgery RN.

## 2018-11-09 NOTE — TELEPHONE ENCOUNTER
Dr. Laura Jennings - Patient states she is having severe swelling at the surgical site. Went to the ED last night due to bleeding, soaked through clothing, ED doc applied steri strips. Now it is not bleeding, but very swollen. Received pain medication from ER. Cannot move her right arm. Advised icing the shoulder throughout the day, to keep as immobile as possible, take your pain medication as directed, and to call if bleeding begins again. Advised her as to office hours. Verbal understanding received.

## 2018-11-09 NOTE — PROGRESS NOTES
Postoperative Patient Follow-up      11/9/2018    Estil Patella 66year old      HPI  Patient presents with:  Post-Op: S/P wide excision of melanoma of the right upper back with layered closure on 11/8/2018.   Patient states she went to ED last night edwige

## 2018-11-09 NOTE — ED INITIAL ASSESSMENT (HPI)
Pt c/o bleeding+hard to touch on surgical site at right scapular area, pt sts that she has melanoma extraction that was done in this hospital by Dr. Ponce Span yesterday at 0900, sts that it is very painful more than yesterday

## 2018-11-23 ENCOUNTER — OFFICE VISIT (OUTPATIENT)
Dept: SURGERY | Facility: CLINIC | Age: 58
End: 2018-11-23
Payer: COMMERCIAL

## 2018-11-23 DIAGNOSIS — C43.59 MALIGNANT MELANOMA OF UPPER BACK (HCC): Primary | ICD-10-CM

## 2018-11-23 PROCEDURE — 99212 OFFICE O/P EST SF 10 MIN: CPT | Performed by: SURGERY

## 2018-11-23 PROCEDURE — 99024 POSTOP FOLLOW-UP VISIT: CPT | Performed by: SURGERY

## 2018-11-24 NOTE — PROGRESS NOTES
Postoperative Patient Follow-up      11/23/2018    Michael Arellano 66year old      HPI  Patient presents with:  Post-Op: Pt here for post op s/p wide excision of melanoma of the right upper back with layered closure on 11/8/18.   Pt c/o bruising that starte

## 2018-11-26 DIAGNOSIS — F41.9 ANXIETY: ICD-10-CM

## 2018-11-26 DIAGNOSIS — F32.A DEPRESSIVE DISORDER: ICD-10-CM

## 2018-11-27 NOTE — TELEPHONE ENCOUNTER
Refill Protocol Appointment Criteria  · Appointment scheduled in the past 12 months or in the next 3 months  Recent Outpatient Visits            3 days ago Malignant melanoma of upper back [facility])    [facility] SHANNAN [place]

## 2018-11-28 RX ORDER — VENLAFAXINE HYDROCHLORIDE 75 MG/1
CAPSULE, EXTENDED RELEASE ORAL
Qty: 90 CAPSULE | Refills: 0 | Status: SHIPPED | OUTPATIENT
Start: 2018-11-28 | End: 2019-03-01

## 2018-11-29 ENCOUNTER — TELEPHONE (OUTPATIENT)
Dept: SURGERY | Facility: CLINIC | Age: 58
End: 2018-11-29

## 2018-11-29 NOTE — TELEPHONE ENCOUNTER
Refill times 1 but needs appt for next script. Please call patient and explain that patient needs to be seen.

## 2018-11-29 NOTE — TELEPHONE ENCOUNTER
\"No precert required\" per Faisal Esposito at [facility] for procedure on 11/08/2018, reference #[id number].

## 2019-01-08 ENCOUNTER — OFFICE VISIT (OUTPATIENT)
Dept: FAMILY MEDICINE CLINIC | Facility: CLINIC | Age: 59
End: 2019-01-08
Payer: COMMERCIAL

## 2019-01-08 VITALS
DIASTOLIC BLOOD PRESSURE: 69 MMHG | WEIGHT: 228 LBS | BODY MASS INDEX: 33.77 KG/M2 | HEIGHT: 69 IN | SYSTOLIC BLOOD PRESSURE: 113 MMHG | HEART RATE: 91 BPM | TEMPERATURE: 98 F

## 2019-01-08 DIAGNOSIS — N30.90 CYSTITIS: ICD-10-CM

## 2019-01-08 DIAGNOSIS — R30.0 DYSURIA: Primary | ICD-10-CM

## 2019-01-08 LAB
APPEARANCE: CLEAR
MULTISTIX LOT#: ABNORMAL NUMERIC
PH, URINE: 6 (ref 4.5–8)
SPECIFIC GRAVITY: 1.02 (ref 1–1.03)
URINE-COLOR: YELLOW

## 2019-01-08 PROCEDURE — 99213 OFFICE O/P EST LOW 20 MIN: CPT | Performed by: FAMILY MEDICINE

## 2019-01-08 PROCEDURE — 81003 URINALYSIS AUTO W/O SCOPE: CPT | Performed by: FAMILY MEDICINE

## 2019-01-08 RX ORDER — CIPROFLOXACIN 500 MG/1
500 TABLET, FILM COATED ORAL 2 TIMES DAILY
Qty: 10 TABLET | Refills: 0 | Status: SHIPPED | OUTPATIENT
Start: 2019-01-08 | End: 2019-01-13

## 2019-01-08 NOTE — PROGRESS NOTES
Patient ID: Teresa Duron is a 66year old female. HPI  Patient presents with:  Painful Urination    She states 3 days ago she started having pain with urination. Some pressure in the suprapubic area.   She states after 12 years of not having intercou 24 Hr TAKE 1 CAPSULE BY MOUTH EVERY DAY Disp: 90 capsule Rfl: 0   Venlafaxine HCl ER 75 MG Oral Capsule SR 24 Hr Take 75 mg by mouth daily.  Disp:  Rfl: 0   PANTOPRAZOLE SODIUM 40 MG Oral Tab EC TAKE 1 TABLET DAILY Disp: 90 tablet Rfl: 0   ATORVASTATIN 80 M Future  -     URINALYSIS NONAUTO W/O SCOPE  UTI hygiene discussed. Try ciprofloxacin as directed. Push fluids. Patient in no distress and does not need to be hospitalized. Cystitis  -     Ciprofloxacin HCl (CIPRO) 500 MG Oral Tab;  Take 1 tablet (500 mg

## 2019-02-11 ENCOUNTER — OFFICE VISIT (OUTPATIENT)
Dept: DERMATOLOGY CLINIC | Facility: CLINIC | Age: 59
End: 2019-02-11
Payer: COMMERCIAL

## 2019-02-11 DIAGNOSIS — D23.5 BENIGN NEOPLASM OF SKIN OF TRUNK, EXCEPT SCROTUM: ICD-10-CM

## 2019-02-11 DIAGNOSIS — L82.1 SEBORRHEIC KERATOSES: ICD-10-CM

## 2019-02-11 DIAGNOSIS — Z80.8 FAMILY HISTORY OF MELANOMA: ICD-10-CM

## 2019-02-11 DIAGNOSIS — D23.70 BENIGN NEOPLASM OF SKIN OF LOWER LIMB, INCLUDING HIP, UNSPECIFIED LATERALITY: ICD-10-CM

## 2019-02-11 DIAGNOSIS — D23.60 BENIGN NEOPLASM OF SKIN OF UPPER LIMB, INCLUDING SHOULDER, UNSPECIFIED LATERALITY: ICD-10-CM

## 2019-02-11 DIAGNOSIS — D22.9 MULTIPLE NEVI: Primary | ICD-10-CM

## 2019-02-11 DIAGNOSIS — D23.30 BENIGN NEOPLASM OF SKIN OF FACE: ICD-10-CM

## 2019-02-11 DIAGNOSIS — Z85.820 PERSONAL HISTORY OF MALIGNANT MELANOMA OF SKIN: ICD-10-CM

## 2019-02-11 DIAGNOSIS — D23.4 BENIGN NEOPLASM OF SCALP AND SKIN OF NECK: ICD-10-CM

## 2019-02-11 PROCEDURE — 99212 OFFICE O/P EST SF 10 MIN: CPT | Performed by: DERMATOLOGY

## 2019-02-11 PROCEDURE — 99213 OFFICE O/P EST LOW 20 MIN: CPT | Performed by: DERMATOLOGY

## 2019-02-21 DIAGNOSIS — J30.89 OTHER ALLERGIC RHINITIS: ICD-10-CM

## 2019-02-21 DIAGNOSIS — E78.2 MIXED HYPERLIPIDEMIA: ICD-10-CM

## 2019-02-21 DIAGNOSIS — K27.9 PUD (PEPTIC ULCER DISEASE): ICD-10-CM

## 2019-02-22 RX ORDER — PANTOPRAZOLE SODIUM 40 MG/1
TABLET, DELAYED RELEASE ORAL
Qty: 90 TABLET | Refills: 0 | Status: SHIPPED | OUTPATIENT
Start: 2019-02-22 | End: 2019-05-05

## 2019-02-22 RX ORDER — ATORVASTATIN CALCIUM 80 MG/1
TABLET, FILM COATED ORAL
Qty: 90 TABLET | Refills: 0 | Status: SHIPPED | OUTPATIENT
Start: 2019-02-22 | End: 2019-05-05

## 2019-02-22 RX ORDER — CETIRIZINE HYDROCHLORIDE 10 MG/1
TABLET ORAL
Qty: 90 TABLET | Refills: 0 | Status: SHIPPED | OUTPATIENT
Start: 2019-02-22 | End: 2019-05-05

## 2019-02-24 NOTE — PROGRESS NOTES
Teresa Duron is a 64year old female. HPI:     CC:  Patient presents with:  Upper Body Exam: 10/19/2018 Patient present wcith raised dry scaley lesion on L shoulder .  Patient requesting upper body skin exam Patient denies personal hx of lazarus Robles Multiple Vitamins-Minerals (MULTIVITAMIN ADULT OR) Take by mouth. Disp:  Rfl:    ANUCORT-HC 25 MG Rectal Suppos UNW AND I 1 SUP REC BID Disp:  Rfl: 1   Coenzyme Q10 (CO Q 10 OR) Take by mouth.  Disp:  Rfl:    Misc Natural Products (GLUCOSAMINE CHOND COMP Former User      Tobacco comment: hx: quit 2007, currently using vapor    Substance and Sexual Activity      Alcohol use:  Yes        Alcohol/week: 0.0 oz        Comment: rarely      Drug use: No      Sexual activity: Not Currently    Lifestyle      Physica presents with:  Upper Body Exam: 10/19/2018 Patient present wcith raised dry scaley lesion on L shoulder .  Patient requesting upper body skin exam Patient denies personal hx of s    Status post excision of melanoma right posterior shoulder upper back 10/20 lower limb, including hip, unspecified laterality  Benign neoplasm of skin of trunk, except scrotum  Benign neoplasm of skin of upper limb, including shoulder, unspecified laterality  Seborrheic keratoses    See details on map.       Remarkable for:    Stat

## 2019-03-01 ENCOUNTER — LAB ENCOUNTER (OUTPATIENT)
Dept: LAB | Age: 59
End: 2019-03-01
Attending: FAMILY MEDICINE
Payer: COMMERCIAL

## 2019-03-01 ENCOUNTER — OFFICE VISIT (OUTPATIENT)
Dept: FAMILY MEDICINE CLINIC | Facility: CLINIC | Age: 59
End: 2019-03-01
Payer: COMMERCIAL

## 2019-03-01 ENCOUNTER — TELEPHONE (OUTPATIENT)
Dept: FAMILY MEDICINE CLINIC | Facility: CLINIC | Age: 59
End: 2019-03-01

## 2019-03-01 VITALS
RESPIRATION RATE: 12 BRPM | SYSTOLIC BLOOD PRESSURE: 119 MMHG | TEMPERATURE: 98 F | DIASTOLIC BLOOD PRESSURE: 66 MMHG | WEIGHT: 227.81 LBS | BODY MASS INDEX: 33.74 KG/M2 | HEART RATE: 75 BPM | HEIGHT: 69 IN

## 2019-03-01 DIAGNOSIS — Z00.00 ADULT GENERAL MEDICAL EXAM: ICD-10-CM

## 2019-03-01 DIAGNOSIS — Z00.00 ADULT GENERAL MEDICAL EXAM: Primary | ICD-10-CM

## 2019-03-01 DIAGNOSIS — R73.9 HYPERGLYCEMIA: ICD-10-CM

## 2019-03-01 DIAGNOSIS — Z23 NEED FOR VACCINATION: ICD-10-CM

## 2019-03-01 DIAGNOSIS — Z12.31 VISIT FOR SCREENING MAMMOGRAM: ICD-10-CM

## 2019-03-01 DIAGNOSIS — F32.A DEPRESSIVE DISORDER: ICD-10-CM

## 2019-03-01 DIAGNOSIS — R05.9 COUGH: ICD-10-CM

## 2019-03-01 DIAGNOSIS — F41.9 ANXIETY: ICD-10-CM

## 2019-03-01 DIAGNOSIS — F17.200 TOBACCO USE DISORDER: ICD-10-CM

## 2019-03-01 LAB
ALBUMIN SERPL-MCNC: 3.7 G/DL (ref 3.4–5)
ALBUMIN/GLOB SERPL: 1 {RATIO} (ref 1–2)
ALP LIVER SERPL-CCNC: 82 U/L (ref 46–118)
ALT SERPL-CCNC: 66 U/L (ref 13–56)
ANION GAP SERPL CALC-SCNC: 5 MMOL/L (ref 0–18)
AST SERPL-CCNC: 62 U/L (ref 15–37)
BASOPHILS # BLD AUTO: 0.01 X10(3) UL (ref 0–0.2)
BASOPHILS NFR BLD AUTO: 0.1 %
BILIRUB SERPL-MCNC: 0.5 MG/DL (ref 0.1–2)
BUN BLD-MCNC: 14 MG/DL (ref 7–18)
BUN/CREAT SERPL: 12.5 (ref 10–20)
CALCIUM BLD-MCNC: 9.3 MG/DL (ref 8.5–10.1)
CHLORIDE SERPL-SCNC: 108 MMOL/L (ref 98–107)
CHOLEST SMN-MCNC: 187 MG/DL (ref ?–200)
CO2 SERPL-SCNC: 29 MMOL/L (ref 21–32)
CREAT BLD-MCNC: 1.12 MG/DL (ref 0.55–1.02)
DEPRECATED RDW RBC AUTO: 46.8 FL (ref 35.1–46.3)
EOSINOPHIL # BLD AUTO: 0 X10(3) UL (ref 0–0.7)
EOSINOPHIL NFR BLD AUTO: 0 %
ERYTHROCYTE [DISTWIDTH] IN BLOOD BY AUTOMATED COUNT: 12.9 % (ref 11–15)
EST. AVERAGE GLUCOSE BLD GHB EST-MCNC: 143 MG/DL (ref 68–126)
GLOBULIN PLAS-MCNC: 3.8 G/DL (ref 2.8–4.4)
GLUCOSE BLD-MCNC: 113 MG/DL (ref 70–99)
HBA1C MFR BLD HPLC: 6.6 % (ref ?–5.7)
HCT VFR BLD AUTO: 45.9 % (ref 35–48)
HDLC SERPL-MCNC: 50 MG/DL (ref 40–59)
HGB BLD-MCNC: 15.1 G/DL (ref 12–16)
IMM GRANULOCYTES # BLD AUTO: 0.04 X10(3) UL (ref 0–1)
IMM GRANULOCYTES NFR BLD: 0.4 %
LDLC SERPL CALC-MCNC: 106 MG/DL (ref ?–100)
LYMPHOCYTES # BLD AUTO: 2.65 X10(3) UL (ref 1–4)
LYMPHOCYTES NFR BLD AUTO: 26.5 %
M PROTEIN MFR SERPL ELPH: 7.5 G/DL (ref 6.4–8.2)
MCH RBC QN AUTO: 32.7 PG (ref 26–34)
MCHC RBC AUTO-ENTMCNC: 32.9 G/DL (ref 31–37)
MCV RBC AUTO: 99.4 FL (ref 80–100)
MONOCYTES # BLD AUTO: 0.54 X10(3) UL (ref 0.1–1)
MONOCYTES NFR BLD AUTO: 5.4 %
NEUTROPHILS # BLD AUTO: 6.75 X10 (3) UL (ref 1.5–7.7)
NEUTROPHILS # BLD AUTO: 6.75 X10(3) UL (ref 1.5–7.7)
NEUTROPHILS NFR BLD AUTO: 67.6 %
NONHDLC SERPL-MCNC: 137 MG/DL (ref ?–130)
OSMOLALITY SERPL CALC.SUM OF ELEC: 295 MOSM/KG (ref 275–295)
PLATELET # BLD AUTO: 233 10(3)UL (ref 150–450)
POTASSIUM SERPL-SCNC: 4.2 MMOL/L (ref 3.5–5.1)
RBC # BLD AUTO: 4.62 X10(6)UL (ref 3.8–5.3)
SODIUM SERPL-SCNC: 142 MMOL/L (ref 136–145)
TRIGL SERPL-MCNC: 157 MG/DL (ref 30–149)
TSI SER-ACNC: 1.28 MIU/ML (ref 0.36–3.74)
VLDLC SERPL CALC-MCNC: 31 MG/DL (ref 0–30)
WBC # BLD AUTO: 10 X10(3) UL (ref 4–11)

## 2019-03-01 PROCEDURE — 83036 HEMOGLOBIN GLYCOSYLATED A1C: CPT

## 2019-03-01 PROCEDURE — 84443 ASSAY THYROID STIM HORMONE: CPT

## 2019-03-01 PROCEDURE — 36415 COLL VENOUS BLD VENIPUNCTURE: CPT

## 2019-03-01 PROCEDURE — 90715 TDAP VACCINE 7 YRS/> IM: CPT | Performed by: FAMILY MEDICINE

## 2019-03-01 PROCEDURE — 99396 PREV VISIT EST AGE 40-64: CPT | Performed by: FAMILY MEDICINE

## 2019-03-01 PROCEDURE — 85025 COMPLETE CBC W/AUTO DIFF WBC: CPT

## 2019-03-01 PROCEDURE — 80053 COMPREHEN METABOLIC PANEL: CPT

## 2019-03-01 PROCEDURE — 99406 BEHAV CHNG SMOKING 3-10 MIN: CPT | Performed by: FAMILY MEDICINE

## 2019-03-01 PROCEDURE — 80061 LIPID PANEL: CPT

## 2019-03-01 PROCEDURE — 90471 IMMUNIZATION ADMIN: CPT | Performed by: FAMILY MEDICINE

## 2019-03-01 RX ORDER — VENLAFAXINE HYDROCHLORIDE 150 MG/1
150 CAPSULE, EXTENDED RELEASE ORAL
Qty: 90 CAPSULE | Refills: 1 | Status: SHIPPED | OUTPATIENT
Start: 2019-03-01 | End: 2019-08-11

## 2019-03-01 RX ORDER — ALBUTEROL SULFATE 90 UG/1
2 AEROSOL, METERED RESPIRATORY (INHALATION) EVERY 6 HOURS PRN
Qty: 3 INHALER | Refills: 0 | Status: SHIPPED | OUTPATIENT
Start: 2019-03-01 | End: 2020-08-24

## 2019-03-01 NOTE — TELEPHONE ENCOUNTER
Spoke with registration and they were aware of orders placed and were completed during the patients visit to lab

## 2019-03-01 NOTE — PROGRESS NOTES
Patient ID: Guadalupe Daniels is a 64year old female. HPI  Patient presents with:  Hyperlipidemia    She is a single mom.   She does have a boyfriend at this time who does not smoke so when she is with him she avoids smoking but then does admit that when 03/19/2021    =======================================================    Lab Results   Component Value Date    WBC 10.3 02/23/2018    RBC 4.60 02/23/2018    HGB 15.1 02/23/2018    HCT 44.2 02/23/2018     02/23/2018    MPV 9.7 02/23/2018    MCV 96.2 <2.0 07/28/2015    NITRITE neg 01/08/2019    LEUUR Negative 07/28/2015    ASCACIDURINE Negative 07/28/2015    WBCUR <1 07/28/2015    RBCUR 4 (H) 07/28/2015    EPIUR Few 07/28/2015    BACUR Few (A) 07/28/2015    MICCOM Completed 07/28/2015     Lab Results change. Respiratory: Negative for chest tightness and shortness of breath. Cardiovascular: Negative for chest pain. Gastrointestinal: Negative for abdominal pain and blood in stool. Genitourinary: Negative for hematuria.    Skin: Negative for colo Sexual activity: Not Currently    Lifestyle      Physical activity:        Days per week: Not on file        Minutes per session: Not on file      Stress: Not on file    Relationships      Social connections:        Talks on phone: Not on file        Gets hours as needed for Wheezing or Shortness of Breath. Disp: 1 Inhaler Rfl: 0   Potassium (POTASSIMIN OR) Take by mouth. Disp:  Rfl:    Multiple Vitamins-Minerals (MULTIVITAMIN ADULT OR) Take by mouth.  Disp:  Rfl:    ANUCORT-HC 25 MG Rectal Suppos UNW AND I exam  Labs were ordered already downstairs. Anxiety  -     Venlafaxine HCl  MG Oral Capsule SR 24 Hr; Take 1 capsule (150 mg total) by mouth once daily. We will increase the venlafaxine to 150 mg to see if that helps control her anxiety more.   Depr reviewed the chart and discharge instructions (if applicable) and agree that the record reflects my personal performance and is accurate and complete.   Anna Casper DO, 3/1/2019, 9:10 AM

## 2019-03-01 NOTE — TELEPHONE ENCOUNTER
Lab calling in requesting orders for lab work for cholesterol/glucose/liver be added to the chart. Pt is currently at the lab, fasted. Please call back with confirmation once added.

## 2019-05-05 DIAGNOSIS — K27.9 PUD (PEPTIC ULCER DISEASE): ICD-10-CM

## 2019-05-05 DIAGNOSIS — E78.2 MIXED HYPERLIPIDEMIA: ICD-10-CM

## 2019-05-05 DIAGNOSIS — J30.89 OTHER ALLERGIC RHINITIS: ICD-10-CM

## 2019-05-06 RX ORDER — CETIRIZINE HYDROCHLORIDE 10 MG/1
TABLET ORAL
Qty: 90 TABLET | Refills: 1 | Status: SHIPPED | OUTPATIENT
Start: 2019-05-06 | End: 2019-11-01

## 2019-05-06 RX ORDER — PANTOPRAZOLE SODIUM 40 MG/1
TABLET, DELAYED RELEASE ORAL
Qty: 90 TABLET | Refills: 1 | Status: SHIPPED | OUTPATIENT
Start: 2019-05-06 | End: 2019-11-01

## 2019-05-06 RX ORDER — ATORVASTATIN CALCIUM 80 MG/1
TABLET, FILM COATED ORAL
Qty: 90 TABLET | Refills: 1 | Status: SHIPPED | OUTPATIENT
Start: 2019-05-06 | End: 2019-11-01

## 2019-06-03 ENCOUNTER — OFFICE VISIT (OUTPATIENT)
Dept: FAMILY MEDICINE CLINIC | Facility: CLINIC | Age: 59
End: 2019-06-03
Payer: COMMERCIAL

## 2019-06-03 VITALS
DIASTOLIC BLOOD PRESSURE: 76 MMHG | SYSTOLIC BLOOD PRESSURE: 125 MMHG | TEMPERATURE: 99 F | HEIGHT: 69 IN | WEIGHT: 231.19 LBS | HEART RATE: 90 BPM | RESPIRATION RATE: 16 BRPM | BODY MASS INDEX: 34.24 KG/M2

## 2019-06-03 DIAGNOSIS — K64.4 EXTERNAL HEMORRHOID, BLEEDING: ICD-10-CM

## 2019-06-03 DIAGNOSIS — F17.200 TOBACCO USE DISORDER: ICD-10-CM

## 2019-06-03 DIAGNOSIS — R06.2 WHEEZING: ICD-10-CM

## 2019-06-03 DIAGNOSIS — R73.03 PREDIABETES: Primary | ICD-10-CM

## 2019-06-03 PROCEDURE — 99214 OFFICE O/P EST MOD 30 MIN: CPT | Performed by: FAMILY MEDICINE

## 2019-06-03 PROCEDURE — 99212 OFFICE O/P EST SF 10 MIN: CPT | Performed by: FAMILY MEDICINE

## 2019-06-03 RX ORDER — METFORMIN HYDROCHLORIDE 500 MG/1
500 TABLET, EXTENDED RELEASE ORAL
Qty: 90 TABLET | Refills: 1 | Status: SHIPPED | OUTPATIENT
Start: 2019-06-03 | End: 2019-11-24

## 2019-06-03 NOTE — PROGRESS NOTES
Patient ID: Gonzales Newman is a 64year old female. HPI  Patient presents with:  Test Results: abnormal lipid and glucose     She is a single mom. She does have a boyfriend at this time. Pt chain smokes half a pack per day on her way to work.  Pt can n 03/01/2019    ALT 66 (H) 03/01/2019    ALKPHOS 67 09/06/2016    BILT 0.5 03/01/2019    TP 7.5 03/01/2019    ALB 3.7 03/01/2019    GLOBULIN 3.8 03/01/2019    AGRATIO 1.2 09/06/2016     03/01/2019    K 4.2 03/01/2019     (H) 03/01/2019    CO2 29. for: SAT    No results found for: IRON, IRONTOT, TIBC, IRONSAT, TRANSFERRIN, TIBCP, IRONBIND, SAT, SATUR    No results found for: DAVID    No results found for: VITD, QVITD, VITD25, GATS23YP  No results found for: PSA, QPSA, TOTPSASCREEN      Wt Readings fro 1993          Current Outpatient Medications:  ATORVASTATIN 80 MG Oral Tab TAKE 1 TABLET EVERY NIGHT Disp: 90 tablet Rfl: 1   CETIRIZINE 10 MG Oral Tab TAKE 1 TABLET DAILY Disp: 90 tablet Rfl: 1   PANTOPRAZOLE SODIUM 40 MG Oral Tab EC TAKE 1 TABLET DAILY D affect     Vitals reviewed. ASSESSMENT/PLAN:     Diagnoses and all orders for this visit:    Prediabetes  -     metFORMIN HCl  MG Oral Tablet 24 Hr;  Take 1 tablet (500 mg total) by mouth daily with breakfast.  -     HEMOGLOBIN A1C; Future  - and in the presence of Ajay Freeman DO. Electronically Signed: Slava Rushing, 6/3/2019, 5:08 PM.    I, Ajay Freeman DO,  personally performed the services described in this documentation.  All medical record entries made by the scribe were at [facility]

## 2019-06-03 NOTE — PATIENT INSTRUCTIONS
Look up Internet information on [employer] . HEALTH TIPS     Exercise, work on your diet, watch your portion sizes. Avoid eating late at night. Make sure to EAT BREAKFAST as people who skip breakfast do not lose weight.   I myself

## 2019-06-07 ENCOUNTER — HOSPITAL ENCOUNTER (OUTPATIENT)
Dept: GENERAL RADIOLOGY | Age: 59
Discharge: HOME | End: 2019-06-07
Attending: FAMILY MEDICINE
Payer: COMMERCIAL

## 2019-06-07 DIAGNOSIS — F17.200 TOBACCO USE DISORDER: ICD-10-CM

## 2019-06-07 DIAGNOSIS — R06.2 WHEEZING: ICD-10-CM

## 2019-06-07 PROCEDURE — 71046 X-RAY EXAM CHEST 2 VIEWS: CPT | Performed by: FAMILY MEDICINE

## 2019-06-13 ENCOUNTER — OFFICE VISIT (OUTPATIENT)
Dept: SURGERY | Facility: CLINIC | Age: 59
End: 2019-06-13
Payer: COMMERCIAL

## 2019-06-13 VITALS — HEIGHT: 69 IN | BODY MASS INDEX: 34.21 KG/M2 | WEIGHT: 231 LBS

## 2019-06-13 DIAGNOSIS — K64.4 RESIDUAL HEMORRHOIDAL SKIN TAGS: Primary | ICD-10-CM

## 2019-06-13 PROCEDURE — 99214 OFFICE O/P EST MOD 30 MIN: CPT | Performed by: SURGERY

## 2019-06-13 PROCEDURE — 46600 DIAGNOSTIC ANOSCOPY SPX: CPT | Performed by: SURGERY

## 2019-06-14 PROBLEM — K64.4 RESIDUAL HEMORRHOIDAL SKIN TAGS: Status: ACTIVE | Noted: 2019-06-14

## 2019-06-15 NOTE — PROGRESS NOTES
HPI:    Patient ID: Montez Gonsalves is a 64year old female presenting with Patient presents with:  Hemorrhoids: Pt referred by Dr. Gretel Buck regarding external hemorrhoid.   Pt states she had PPD by Dr. Sajan Hidalgo 2012 but recently her hemorrhoids started to blee quit 2007, currently using vapor    Substance and Sexual Activity      Alcohol use:  Yes        Alcohol/week: 0.0 oz        Comment: rarely      Drug use: No      Sexual activity: Not Currently    Lifestyle      Physical activity:        Days per week: Not Negative. Hematological: Does not bruise/bleed easily. Psychiatric/Behavioral: Negative.            Current Outpatient Medications:  metFORMIN HCl  MG Oral Tablet 24 Hr Take 1 tablet (500 mg total) by mouth daily with breakfast. Disp: 90 tablet R oriented to person, place, and time. Skin: Skin is warm, dry and intact. Psychiatric: She has a normal mood and affect.  Her speech is normal and behavior is normal.              ASSESSMENT/PLAN:   Diagnoses and all orders for this visit:    Residual he

## 2019-06-15 NOTE — PROCEDURES
Procedure Report    Patient Name:  Silvia Sun  MR:  [id number]  :  1960  DOS:  19    Preop Dx:   Residual hemorrhoidal skin tags  (primary encounter diagnosis)   SNOMED CT(R): RESIDUAL HEMORRHOIDAL SKIN TAGS    Postop Dx:   Residual hemor

## 2019-08-09 ENCOUNTER — APPOINTMENT (OUTPATIENT)
Dept: LAB | Age: 59
End: 2019-08-09
Attending: FAMILY MEDICINE
Payer: COMMERCIAL

## 2019-08-09 DIAGNOSIS — R73.03 PREDIABETES: ICD-10-CM

## 2019-08-09 LAB
ANION GAP SERPL CALC-SCNC: 7 MMOL/L (ref 0–18)
BUN BLD-MCNC: 16 MG/DL (ref 7–18)
BUN/CREAT SERPL: 13.8 (ref 10–20)
CALCIUM BLD-MCNC: 9 MG/DL (ref 8.5–10.1)
CHLORIDE SERPL-SCNC: 107 MMOL/L (ref 98–112)
CO2 SERPL-SCNC: 26 MMOL/L (ref 21–32)
CREAT BLD-MCNC: 1.16 MG/DL (ref 0.55–1.02)
EST. AVERAGE GLUCOSE BLD GHB EST-MCNC: 143 MG/DL (ref 68–126)
GLUCOSE BLD-MCNC: 100 MG/DL (ref 70–99)
HBA1C MFR BLD HPLC: 6.6 % (ref ?–5.7)
OSMOLALITY SERPL CALC.SUM OF ELEC: 291 MOSM/KG (ref 275–295)
PATIENT FASTING: YES
POTASSIUM SERPL-SCNC: 4.3 MMOL/L (ref 3.5–5.1)
SODIUM SERPL-SCNC: 140 MMOL/L (ref 136–145)

## 2019-08-09 PROCEDURE — 80048 BASIC METABOLIC PNL TOTAL CA: CPT

## 2019-08-09 PROCEDURE — 83036 HEMOGLOBIN GLYCOSYLATED A1C: CPT

## 2019-08-09 PROCEDURE — 36415 COLL VENOUS BLD VENIPUNCTURE: CPT

## 2019-08-11 DIAGNOSIS — F32.A DEPRESSIVE DISORDER: ICD-10-CM

## 2019-08-11 DIAGNOSIS — F41.9 ANXIETY: ICD-10-CM

## 2019-08-12 RX ORDER — VENLAFAXINE HYDROCHLORIDE 150 MG/1
CAPSULE, EXTENDED RELEASE ORAL
Qty: 90 CAPSULE | Refills: 1 | Status: SHIPPED | OUTPATIENT
Start: 2019-08-12 | End: 2020-02-09

## 2019-08-12 NOTE — TELEPHONE ENCOUNTER
Refill passed per [facility] protocol.     Refill Protocol Appointment Criteria  · Appointment scheduled in the past 6 months or in the next 3 months  Recent Outpatient Visits            2 months ago Residual hemorrhoidal skin tags    [address]

## 2019-08-22 ENCOUNTER — OFFICE VISIT (OUTPATIENT)
Dept: FAMILY MEDICINE CLINIC | Facility: CLINIC | Age: 59
End: 2019-08-22
Payer: COMMERCIAL

## 2019-08-22 VITALS
HEART RATE: 77 BPM | HEIGHT: 69 IN | DIASTOLIC BLOOD PRESSURE: 74 MMHG | SYSTOLIC BLOOD PRESSURE: 137 MMHG | BODY MASS INDEX: 34.36 KG/M2 | TEMPERATURE: 98 F | WEIGHT: 232 LBS

## 2019-08-22 DIAGNOSIS — G44.209 TENSION HEADACHE: ICD-10-CM

## 2019-08-22 DIAGNOSIS — G47.8 SLEEP TALKING: ICD-10-CM

## 2019-08-22 DIAGNOSIS — H81.13 BENIGN PAROXYSMAL POSITIONAL VERTIGO DUE TO BILATERAL VESTIBULAR DISORDER: ICD-10-CM

## 2019-08-22 DIAGNOSIS — M26.629 TMJ PAIN DYSFUNCTION SYNDROME: ICD-10-CM

## 2019-08-22 DIAGNOSIS — L29.9 ITCHING OF EAR: Primary | ICD-10-CM

## 2019-08-22 PROCEDURE — 99212 OFFICE O/P EST SF 10 MIN: CPT | Performed by: FAMILY MEDICINE

## 2019-08-22 PROCEDURE — 99214 OFFICE O/P EST MOD 30 MIN: CPT | Performed by: FAMILY MEDICINE

## 2019-08-22 RX ORDER — NAPROXEN 500 MG/1
500 TABLET ORAL 2 TIMES DAILY WITH MEALS
Qty: 60 TABLET | Refills: 0 | Status: SHIPPED | OUTPATIENT
Start: 2019-08-22 | End: 2019-10-02

## 2019-08-22 NOTE — PATIENT INSTRUCTIONS
TEMPOROMANDIBULAR JOINT PAIN PLAN    You have bilateral TMJ dysfunction (temporomandibular joint dysfunction). Do warm compresses 2-3 times daily for 5 minutes each time and take medicine as directed.   Avoid ch

## 2019-08-22 NOTE — PROGRESS NOTES
Patient ID: Montez Gonsalves is a 64year old female. HPI  Patient presents with:  Itching: ears  Headache: sharp pain   Dizziness    Pt has had ear itchiness starting a couple months ago.  Her left ear was very sore yesterday and she has wanted to shove Neurological: Positive for dizziness and headaches. Negative for speech difficulty. Psychiatric/Behavioral: The patient is not nervous/anxious.           Past Medical History:   Diagnosis Date   • Anxiety state, unspecified    • Arthritis    • Asthma Physical Exam   Physical Exam   Constitutional: Patient is oriented to person, place, and time. Patient appears well-developed and well-nourished. No distress. HENT:   Head: Normocephalic.    TMJ joints: TMJ tenderness bilaterally, no subluxation or pop improve especially if you grind your teeth.   The brand that I wear is NTI but your dentist of course will decide which is best for you and if a mouth guard is needed.      ========================================================================    Rosenda Frederick described in this documentation. All medical record entries made by the scribe were at my direction and in my presence.   I have reviewed the chart and discharge instructions (if applicable) and agree that the record reflects my personal performance and is

## 2019-09-09 ENCOUNTER — OFFICE VISIT (OUTPATIENT)
Dept: DERMATOLOGY CLINIC | Facility: CLINIC | Age: 59
End: 2019-09-09
Payer: COMMERCIAL

## 2019-09-09 DIAGNOSIS — D23.4 BENIGN NEOPLASM OF SCALP AND SKIN OF NECK: ICD-10-CM

## 2019-09-09 DIAGNOSIS — D23.30 BENIGN NEOPLASM OF SKIN OF FACE: ICD-10-CM

## 2019-09-09 DIAGNOSIS — D48.5 NEOPLASM OF UNCERTAIN BEHAVIOR OF SKIN: Primary | ICD-10-CM

## 2019-09-09 DIAGNOSIS — L82.1 SEBORRHEIC KERATOSES: ICD-10-CM

## 2019-09-09 DIAGNOSIS — D23.70 BENIGN NEOPLASM OF SKIN OF LOWER LIMB, INCLUDING HIP, UNSPECIFIED LATERALITY: ICD-10-CM

## 2019-09-09 DIAGNOSIS — D23.60 BENIGN NEOPLASM OF SKIN OF UPPER LIMB, INCLUDING SHOULDER, UNSPECIFIED LATERALITY: ICD-10-CM

## 2019-09-09 DIAGNOSIS — D23.5 BENIGN NEOPLASM OF SKIN OF TRUNK, EXCEPT SCROTUM: ICD-10-CM

## 2019-09-09 DIAGNOSIS — Z80.8 FAMILY HISTORY OF MELANOMA: ICD-10-CM

## 2019-09-09 DIAGNOSIS — D22.9 MULTIPLE NEVI: ICD-10-CM

## 2019-09-09 DIAGNOSIS — Z85.820 PERSONAL HISTORY OF MALIGNANT MELANOMA OF SKIN: ICD-10-CM

## 2019-09-09 PROCEDURE — 11102 TANGNTL BX SKIN SINGLE LES: CPT | Performed by: DERMATOLOGY

## 2019-09-09 PROCEDURE — 88305 TISSUE EXAM BY PATHOLOGIST: CPT | Performed by: DERMATOLOGY

## 2019-09-09 PROCEDURE — 99213 OFFICE O/P EST LOW 20 MIN: CPT | Performed by: DERMATOLOGY

## 2019-09-11 NOTE — PROGRESS NOTES
The pathology report from last visit showed left lateral back, shave biopsy:-Compound lentiginous nevus f/u in . Please log in test results. Please call patient and inform of results and recommendations.  (please add to history).   Pt to  rtc  4-6 mos   o

## 2019-09-20 NOTE — PROGRESS NOTES
Spoke with patient. Verified name and . Informed patient of test results and Dr. Sanjiv Posada recommendations. Patient verbalizes understanding of test results and will call office back to schedule an appointment.

## 2019-09-22 NOTE — PROGRESS NOTES
Operative Report                     Shave/  Tangential biopsy     Clinical diagnosis:    Size of lesion:    Location:Diagnosis/Clinical History: pt with hx melanoma new dark papule  Spec 1 Description >>>>>: left lateral back  Spec 1 Comment: 4x6mm

## 2019-09-22 NOTE — PROGRESS NOTES
Jonas Mayers is a 64year old female. HPI:     CC:  Patient presents with:  Derm Problem: LOV 2/11/19. pt presenting today with upper body check. HX of Malignant Melanoma        Allergies:  Patient has no known allergies.     HISTORY:    Past Medical Hi DAILY Disp: 90 tablet Rfl: 1   Potassium (POTASSIMIN OR) Take by mouth. Disp:  Rfl:    Multiple Vitamins-Minerals (MULTIVITAMIN ADULT OR) Take by mouth. Disp:  Rfl:    Coenzyme Q10 (CO Q 10 OR) Take by mouth.  Disp:  Rfl:    Misc [place] ([address] Not on file        Inability: Not on file      Transportation needs:        Medical: Not on file        Non-medical: Not on file    Tobacco Use      Smoking status: Current Every Day Smoker        Packs/day: 0.50        Types: Cigarettes      Smokeless tob History   Problem Relation Age of Onset   • Hypertension Mother    • Other (Other) Mother         a fib   • Cancer Maternal Grandmother         ?liver   • Cancer Son         malignant melanoma   • Diabetes Neg        There were no vitals filed for this vis noted . Otherwise remarkable for lesions as noted on map.   See details of examination  See Assessment /Plan for additional history and physical exam also:    Assessment / plan:    Orders Placed This Encounter      Specimen to Pathology, Tissue [IHP Pt specific lesions. See additional diagnoses. Pros cons of various therapies, risks benefits discussed. Pathophysiology discussed with patient. Therapeutic options reviewed. See  Medications in grid. Instructions reviewed at length.     Benign nevi, mansoor

## 2019-10-01 ENCOUNTER — NURSE TRIAGE (OUTPATIENT)
Dept: FAMILY MEDICINE CLINIC | Facility: CLINIC | Age: 59
End: 2019-10-01

## 2019-10-01 NOTE — TELEPHONE ENCOUNTER
Action Requested: Summary for Provider     []  Critical Lab, Recommendations Needed  [] Need Additional Advice  []   FYI    []   Need Orders  [] Need Medications Sent to Pharmacy  []  Other     SUMMARY: Per protocol advised OV. Patient requesting 10/2.  Herson

## 2019-10-02 ENCOUNTER — APPOINTMENT (OUTPATIENT)
Dept: LAB | Age: 59
End: 2019-10-02
Attending: NURSE PRACTITIONER
Payer: COMMERCIAL

## 2019-10-02 ENCOUNTER — LAB ENCOUNTER (OUTPATIENT)
Dept: LAB | Age: 59
End: 2019-10-02
Attending: NURSE PRACTITIONER
Payer: COMMERCIAL

## 2019-10-02 DIAGNOSIS — R19.7 DIARRHEA OF PRESUMED INFECTIOUS ORIGIN: Primary | ICD-10-CM

## 2019-10-02 PROBLEM — J45.21 MILD INTERMITTENT ASTHMA WITH ACUTE EXACERBATION: Status: ACTIVE | Noted: 2019-10-02

## 2019-10-02 PROBLEM — J45.21 MILD INTERMITTENT ASTHMA WITH ACUTE EXACERBATION (HCC): Status: ACTIVE | Noted: 2019-10-02

## 2019-10-02 PROCEDURE — 87077 CULTURE AEROBIC IDENTIFY: CPT

## 2019-10-02 PROCEDURE — 87045 FECES CULTURE AEROBIC BACT: CPT

## 2019-10-02 PROCEDURE — 83013 H PYLORI (C-13) BREATH: CPT

## 2019-10-02 PROCEDURE — 87427 SHIGA-LIKE TOXIN AG IA: CPT

## 2019-10-02 PROCEDURE — 89055 LEUKOCYTE ASSESSMENT FECAL: CPT | Performed by: NURSE PRACTITIONER

## 2019-10-02 PROCEDURE — 87046 STOOL CULTR AEROBIC BACT EA: CPT

## 2019-10-02 NOTE — ASSESSMENT & PLAN NOTE
Enc to quit smoking  con't albuterol hfa 2 puffs qid prn  Add symbicort 80/4.5 mcg 2 puffs bid-will need prior auth (pt is actively wheezing, has slight sob and coughing-not controlled on albuterol)

## 2019-10-02 NOTE — PATIENT INSTRUCTIONS
Diarrhea with Uncertain Cause (Adult)    Diarrhea is when stools are loose and watery.  This can be caused by:  · Viral infections  · Bacterial infections  · Food poisoning  · Parasites  · Irritable bowel syndrome (IBS)  · Inflammatory bowel diseases such · You may use acetaminophen or NSAID medicines like ibuprofen or naproxen to reduce pain and fever. Don’t use these if you have chronic liver or kidney disease, or ever had a stomach ulcer or gastrointestinal bleeding.  Don't use NSAID medicines if you are · Don’t force yourself to eat, especially if you are having cramping, vomiting, or diarrhea. Don’t eat large amounts at a time, even if you are hungry. · If you eat, avoid fatty, greasy, spicy, or fried foods.   · Don’t eat dairy foods or drink milk if you · Abdominal pain that gets worse  · Constant lower right abdominal pain  · Continued vomiting and inability to keep liquids down  · Diarrhea more than 5 times a day  · Blood in vomit or stool  · Dark urine or no urine for 8 hours, dry mouth and tongue, tir · If you were prescribed antibiotics, take them as directed.   · Do not take anti-diarrhea medicines without asking your healthcare provider first.  Call your healthcare provider   Call your healthcare provider if you have any of the following:   · A fever

## 2019-10-02 NOTE — PROGRESS NOTES
HPI  Pt here for nausea, stomach pain, cramping and diarrhea and hot flashes. Having increase in hemorrhoid pain. Is on Metformin-has been on same dose since June. Has found that certain foods have increased her diarrhea and pain.  Takes protonix for h/ Laterality Date   • Biopsy of skin lesion Right 11/08/2018    melanoma upper back   • Breast biopsy Right 1987    skin cyst   • Breast biopsy Left 2018    skin cyst   • Colonoscopy  10/02/2015   • Excis bartholin gland/cyst Left 11/16/2012   • Hemorrhoidec or ex partner: Not on file        Emotionally abused: Not on file        Physically abused: Not on file        Forced sexual activity: Not on file    Other Topics      Concerns:        Grew up on a farm: Not Asked        History of tanning: Not Asked Vitamins-Minerals (MULTIVITAMIN ADULT OR) Take by mouth. Disp:  Rfl:    ANUCORT-HC 25 MG Rectal Suppos UNW AND I 1 SUP REC BID Disp:  Rfl: 1   Coenzyme Q10 (CO Q 10 OR) Take by mouth.  Disp:  Rfl:    Misc Natural Products (GLUCOSAMINE CHOND COMPLEX/MSM OR) actively wheezing, has slight sob and coughing-not controlled on albuterol)         Relevant Medications    Budesonide-Formoterol Fumarate 80-4.5 MCG/ACT Inhalation Aerosol       Digestive    External hemorrhoid, bleeding     Pt states has suppositories at

## 2019-10-21 ENCOUNTER — NURSE TRIAGE (OUTPATIENT)
Dept: OTHER | Age: 59
End: 2019-10-21

## 2019-10-25 ENCOUNTER — HOSPITAL ENCOUNTER (OUTPATIENT)
Dept: GENERAL RADIOLOGY | Age: 59
Discharge: HOME | End: 2019-10-25
Attending: NURSE PRACTITIONER
Payer: COMMERCIAL

## 2019-10-25 ENCOUNTER — NURSE TRIAGE (OUTPATIENT)
Dept: FAMILY MEDICINE CLINIC | Facility: CLINIC | Age: 59
End: 2019-10-25

## 2019-10-25 ENCOUNTER — OFFICE VISIT (OUTPATIENT)
Dept: FAMILY MEDICINE CLINIC | Facility: CLINIC | Age: 59
End: 2019-10-25
Payer: COMMERCIAL

## 2019-10-25 VITALS
OXYGEN SATURATION: 95 % | WEIGHT: 236.38 LBS | HEART RATE: 94 BPM | HEIGHT: 69 IN | SYSTOLIC BLOOD PRESSURE: 139 MMHG | DIASTOLIC BLOOD PRESSURE: 79 MMHG | BODY MASS INDEX: 35.01 KG/M2 | RESPIRATION RATE: 19 BRPM | TEMPERATURE: 98 F

## 2019-10-25 DIAGNOSIS — J45.41 MODERATE PERSISTENT ASTHMA WITH ACUTE EXACERBATION: ICD-10-CM

## 2019-10-25 DIAGNOSIS — F17.200 TOBACCO USE DISORDER: ICD-10-CM

## 2019-10-25 DIAGNOSIS — R05.9 COUGH: ICD-10-CM

## 2019-10-25 DIAGNOSIS — J45.41 MODERATE PERSISTENT ASTHMA WITH ACUTE EXACERBATION: Primary | ICD-10-CM

## 2019-10-25 PROCEDURE — 99214 OFFICE O/P EST MOD 30 MIN: CPT | Performed by: NURSE PRACTITIONER

## 2019-10-25 PROCEDURE — 71046 X-RAY EXAM CHEST 2 VIEWS: CPT | Performed by: NURSE PRACTITIONER

## 2019-10-25 PROCEDURE — 99213 OFFICE O/P EST LOW 20 MIN: CPT | Performed by: NURSE PRACTITIONER

## 2019-10-25 PROCEDURE — 94640 AIRWAY INHALATION TREATMENT: CPT | Performed by: NURSE PRACTITIONER

## 2019-10-25 RX ORDER — SOFT LENS DISINFECTANT
SOLUTION, NON-ORAL MISCELLANEOUS
Qty: 1 DEVICE | Refills: 0 | Status: SHIPPED | OUTPATIENT
Start: 2019-10-25 | End: 2020-08-24

## 2019-10-25 RX ORDER — GUAIFENESIN AND CODEINE PHOSPHATE 100; 10 MG/5ML; MG/5ML
SOLUTION ORAL
Qty: 118 ML | Refills: 0 | Status: SHIPPED | OUTPATIENT
Start: 2019-10-25 | End: 2020-08-24

## 2019-10-25 RX ORDER — IPRATROPIUM BROMIDE AND ALBUTEROL SULFATE 2.5; .5 MG/3ML; MG/3ML
3 SOLUTION RESPIRATORY (INHALATION) EVERY 4 HOURS PRN
Qty: 50 VIAL | Refills: 0 | Status: SHIPPED | OUTPATIENT
Start: 2019-10-25 | End: 2021-07-01

## 2019-10-25 RX ORDER — IPRATROPIUM BROMIDE AND ALBUTEROL SULFATE 2.5; .5 MG/3ML; MG/3ML
3 SOLUTION RESPIRATORY (INHALATION) ONCE
Status: COMPLETED | OUTPATIENT
Start: 2019-10-25 | End: 2019-10-25

## 2019-10-25 RX ORDER — LEVOFLOXACIN 500 MG/1
500 TABLET, FILM COATED ORAL DAILY
Qty: 10 TABLET | Refills: 0 | Status: SHIPPED | OUTPATIENT
Start: 2019-10-25 | End: 2019-11-04

## 2019-10-25 RX ORDER — PREDNISONE 20 MG/1
TABLET ORAL
Qty: 15 TABLET | Refills: 0 | Status: SHIPPED | OUTPATIENT
Start: 2019-10-25 | End: 2020-08-24

## 2019-10-25 RX ADMIN — IPRATROPIUM BROMIDE AND ALBUTEROL SULFATE 3 ML: 2.5; .5 SOLUTION RESPIRATORY (INHALATION) at 14:45:00

## 2019-10-25 NOTE — PROGRESS NOTES
HPI    Pt presents with productive green phlegm w/ SOB x 5 days. Chest hurts when coughing. Has stress incontinence with coughing. Started 5 days ago with dry cough-took dayquil, nyquil and zinc. Cough is progressed to now productive.  Just got her symbi Colonoscopy  10/02/2015   • Excis bartholin gland/cyst Left 2012   • Hemorrhoidectomy      PPH   •          Family History   Problem Relation Age of Onset   • Hypertension Mother    • Other (Other) Mother         a fib   • Cancer Maternal Concerns:        Grew up on a farm: Not Asked        History of tanning: Not Asked        Outdoor occupation: Not Asked        Pt has a pacemaker: No        Pt has a defibrillator: Not Asked        Breast feeding: Not Asked        Reaction to local anesthe Disp: 90 tablet, Rfl: 1  Albuterol Sulfate  (90 Base) MCG/ACT Inhalation Aero Soln, Inhale 2 puffs into the lungs every 6 (six) hours as needed for Wheezing or Shortness of Breath., Disp: 3 Inhaler, Rfl: 0  Potassium (POTASSIMIN OR), Take by mouth. , Normal range of motion. She exhibits no tenderness. Lymphadenopathy:     She has no cervical adenopathy. Neurological: She is alert and oriented to person, place, and time. Coordination normal.   Skin: Skin is warm and dry. No rash noted.    Psychiatric

## 2019-10-25 NOTE — PROCEDURES
Duoneb treatment given to pt . Starting O2 sat 94%. Pt tolerated treatment well; less wheezing.  O2 sat 95%

## 2019-10-29 NOTE — ASSESSMENT & PLAN NOTE
duoneb treatment in office  duoneb solution for home use 1 po q4-6 hrs prn  levaquin 500 mg I po q day x 10 days  cheratussin ac 1 tsp qid prn  cxr today  Supportive care discussed to help alleviate symptoms  Please call if symptoms worsen or are not resol

## 2019-10-30 ENCOUNTER — OFFICE VISIT (OUTPATIENT)
Dept: FAMILY MEDICINE CLINIC | Facility: CLINIC | Age: 59
End: 2019-10-30
Payer: COMMERCIAL

## 2019-10-30 VITALS
WEIGHT: 236 LBS | HEART RATE: 114 BPM | BODY MASS INDEX: 34.96 KG/M2 | SYSTOLIC BLOOD PRESSURE: 155 MMHG | DIASTOLIC BLOOD PRESSURE: 83 MMHG | HEIGHT: 69 IN

## 2019-10-30 DIAGNOSIS — N39.3 STRESS INCONTINENCE IN FEMALE: ICD-10-CM

## 2019-10-30 DIAGNOSIS — J45.41 MODERATE PERSISTENT ASTHMA WITH ACUTE EXACERBATION: Primary | ICD-10-CM

## 2019-10-30 PROCEDURE — 99212 OFFICE O/P EST SF 10 MIN: CPT | Performed by: NURSE PRACTITIONER

## 2019-10-30 PROCEDURE — 99214 OFFICE O/P EST MOD 30 MIN: CPT | Performed by: NURSE PRACTITIONER

## 2019-10-30 RX ORDER — PROMETHAZINE HYDROCHLORIDE AND CODEINE PHOSPHATE 6.25; 1 MG/5ML; MG/5ML
2.5 SYRUP ORAL EVERY 4 HOURS PRN
Qty: 180 ML | Refills: 0 | Status: SHIPPED | OUTPATIENT
Start: 2019-10-30 | End: 2020-08-24

## 2019-10-30 NOTE — PROGRESS NOTES
HPI    Pt here for f/u asthma exacerbation. Still coughing a lot-productive for sm amts of phlegm. Has sore throat. Is having a lot of urinary incontinence. Taking levaquin as advised. SOB with exertion.      Does nebulizer twice a day b/c she can't do 1987    skin cyst   • Breast biopsy Left 2018    skin cyst   • Colonoscopy  10/02/2015   • Excis bartholin gland/cyst Left 2012   • Hemorrhoidectomy      PPH   • Nebulizer, ultrasonic  10/25/2019   •          Family History   Problem Rela Physically abused: Not on file        Forced sexual activity: Not on file    Other Topics      Concerns:        Grew up on a farm: Not Asked        History of tanning: Not Asked        Outdoor occupation: Not Asked        Pt has a pacemaker: No        P Nausea., Disp: 20 tablet, Rfl: 0  Budesonide-Formoterol Fumarate 80-4.5 MCG/ACT Inhalation Aerosol, Inhale 2 puffs into the lungs 2 (two) times daily. , Disp: 1 Inhaler, Rfl: 1  VENLAFAXINE HCL  MG Oral Capsule SR 24 Hr, TAKE 1 CAPSULE DAILY, Disp: 90 Syrup       Genitourinary    Stress incontinence in female    Relevant Orders    PELVIC FLOOR THERAPY - INTERNAL                      Discussed plan of care with pt and pt is in agreement. All questions answered. Pt to call with questions or concerns.     En

## 2019-11-01 DIAGNOSIS — J30.89 OTHER ALLERGIC RHINITIS: ICD-10-CM

## 2019-11-01 DIAGNOSIS — E78.2 MIXED HYPERLIPIDEMIA: ICD-10-CM

## 2019-11-01 DIAGNOSIS — K27.9 PUD (PEPTIC ULCER DISEASE): ICD-10-CM

## 2019-11-02 RX ORDER — PANTOPRAZOLE SODIUM 40 MG/1
TABLET, DELAYED RELEASE ORAL
Qty: 90 TABLET | Refills: 1 | Status: SHIPPED | OUTPATIENT
Start: 2019-11-02 | End: 2020-04-30

## 2019-11-02 RX ORDER — ATORVASTATIN CALCIUM 80 MG/1
TABLET, FILM COATED ORAL
Qty: 90 TABLET | Refills: 1 | Status: SHIPPED | OUTPATIENT
Start: 2019-11-02 | End: 2020-04-30

## 2019-11-02 RX ORDER — CETIRIZINE HYDROCHLORIDE 10 MG/1
TABLET ORAL
Qty: 90 TABLET | Refills: 1 | Status: SHIPPED | OUTPATIENT
Start: 2019-11-02 | End: 2020-04-30

## 2019-11-24 DIAGNOSIS — R73.03 PREDIABETES: ICD-10-CM

## 2019-11-25 RX ORDER — METFORMIN HYDROCHLORIDE 500 MG/1
TABLET, EXTENDED RELEASE ORAL
Qty: 90 TABLET | Refills: 1 | Status: SHIPPED | OUTPATIENT
Start: 2019-11-25 | End: 2020-05-24

## 2019-11-25 NOTE — TELEPHONE ENCOUNTER
Refill passed per [facility] protocol.     Diabetes Medications  Protocol Criteria:  · Appointment scheduled in the past 6 months or the next 3 months  · A1C < 7.5 in the past 6 months  · Creatinine in the past 12 months  · Creatinine result < 1.5   Re

## 2020-02-05 ENCOUNTER — OFFICE VISIT (OUTPATIENT)
Dept: DERMATOLOGY CLINIC | Facility: CLINIC | Age: 60
End: 2020-02-05
Payer: COMMERCIAL

## 2020-02-05 DIAGNOSIS — L82.1 SEBORRHEIC KERATOSES: ICD-10-CM

## 2020-02-05 DIAGNOSIS — D23.4 BENIGN NEOPLASM OF SCALP AND SKIN OF NECK: ICD-10-CM

## 2020-02-05 DIAGNOSIS — D23.5 BENIGN NEOPLASM OF SKIN OF TRUNK, EXCEPT SCROTUM: ICD-10-CM

## 2020-02-05 DIAGNOSIS — D23.70 BENIGN NEOPLASM OF SKIN OF LOWER LIMB, INCLUDING HIP, UNSPECIFIED LATERALITY: ICD-10-CM

## 2020-02-05 DIAGNOSIS — Z80.8 FAMILY HISTORY OF MELANOMA: ICD-10-CM

## 2020-02-05 DIAGNOSIS — Z85.820 PERSONAL HISTORY OF MALIGNANT MELANOMA OF SKIN: ICD-10-CM

## 2020-02-05 DIAGNOSIS — D23.60 BENIGN NEOPLASM OF SKIN OF UPPER LIMB, INCLUDING SHOULDER, UNSPECIFIED LATERALITY: ICD-10-CM

## 2020-02-05 DIAGNOSIS — D23.30 BENIGN NEOPLASM OF SKIN OF FACE: ICD-10-CM

## 2020-02-05 DIAGNOSIS — D22.9 MULTIPLE NEVI: Primary | ICD-10-CM

## 2020-02-05 PROCEDURE — 99213 OFFICE O/P EST LOW 20 MIN: CPT | Performed by: DERMATOLOGY

## 2020-02-05 PROCEDURE — 99212 OFFICE O/P EST SF 10 MIN: CPT | Performed by: DERMATOLOGY

## 2020-02-07 DIAGNOSIS — F41.9 ANXIETY: ICD-10-CM

## 2020-02-07 DIAGNOSIS — F32.A DEPRESSIVE DISORDER: ICD-10-CM

## 2020-02-09 RX ORDER — VENLAFAXINE HYDROCHLORIDE 150 MG/1
CAPSULE, EXTENDED RELEASE ORAL
Qty: 90 CAPSULE | Refills: 0 | Status: SHIPPED | OUTPATIENT
Start: 2020-02-09 | End: 2020-05-10

## 2020-02-17 NOTE — PROGRESS NOTES
Montez Gonsalves is a 64year old female. HPI:     CC:  Patient presents with:  Full Skin Exam: LOV 9/2019. Pt requesting full skin exam. Concerned with lesions on scalp. \"Im a \". Son has hx of melanoma.  Personal hx of melanoma 2018 (right shoulder- 90 tablet 1   • CETIRIZINE 10 MG Oral Tab TAKE 1 TABLET DAILY 90 tablet 1   • PANTOPRAZOLE SODIUM 40 MG Oral Tab EC TAKE 1 TABLET DAILY 90 tablet 1   • promethazine-codeine 6.25-10 MG/5ML Oral Syrup Take 2.5 mL by mouth every 4 (four) hours as needed for c 09/09/2019    lower lateral back   • Malignant melanoma of upper back ([address] 10/18/2018   • Pregnancy     EAB x 3     Past Surgical History:   Procedure Laterality Date   • BIOPSY OF SKIN LESION Right 11/08/2018    melanoma upper back   • BREAST BIOPSY [place]igh Emotionally abused: Not on file        Physically abused: Not on file        Forced sexual activity: Not on file    Other Topics      Concerns:        Grew up on a farm: Not Asked        History of tanning: Not Asked        Outdoor occupation: Not Asked Status post excision of melanoma right posterior shoulder upper back 10/2018.  0.5 mm     fhx skin ca-nmsc father and melanoma in son. Patient presents with concerns above. Patient has been in their usual state of health.   History, medications, face  Benign neoplasm of skin of lower limb, including hip, unspecified laterality  Benign neoplasm of skin of trunk, except scrotum  Benign neoplasm of skin of upper limb, including shoulder, unspecified laterality  Seborrheic keratoses    See details on of melanoma discussed with patient. Sunscreen use, sun protection, self exams reviewed. Followup as noted RTC routine checkup 6 mos - one year or p.r.n. This note was generated using Dragon voice recognition software.   Please contact me regarding any c

## 2020-03-20 ENCOUNTER — TELEPHONE (OUTPATIENT)
Dept: FAMILY MEDICINE CLINIC | Facility: CLINIC | Age: 60
End: 2020-03-20

## 2020-03-23 NOTE — TELEPHONE ENCOUNTER
Message # [id number] 2020 08:16p [ANNEISHAT]  To: Pete Ba, [address]  From: Ebenezer Short MD:  Phone#: [phone]  ----------------------------------------------------------------------  :64 RE:FEELS NAUSEOUS          (Message Delivered)  LEANDER GARCIA

## 2020-03-24 NOTE — TELEPHONE ENCOUNTER
Patient called and reported that she felt nauseous when her boyfriend cook beef. Patient stated that she went to her car and sat there and she felt better. Advise patient to avoid the environment. No treatment needed at this time.  Patient verbalized unders

## 2020-03-24 NOTE — TELEPHONE ENCOUNTER
Is she doing better now. No more nausea? If she still nauseated and wants to be seen she is more than welcome to come in today as that we need to evaluate this but it would have to be a focused visit.

## 2020-04-29 DIAGNOSIS — E78.2 MIXED HYPERLIPIDEMIA: ICD-10-CM

## 2020-04-29 DIAGNOSIS — J30.89 OTHER ALLERGIC RHINITIS: ICD-10-CM

## 2020-04-29 DIAGNOSIS — K27.9 PUD (PEPTIC ULCER DISEASE): ICD-10-CM

## 2020-04-30 RX ORDER — ATORVASTATIN CALCIUM 80 MG/1
TABLET, FILM COATED ORAL
Qty: 90 TABLET | Refills: 0 | Status: SHIPPED | OUTPATIENT
Start: 2020-04-30 | End: 2020-07-29

## 2020-04-30 RX ORDER — CETIRIZINE HYDROCHLORIDE 10 MG/1
TABLET ORAL
Qty: 90 TABLET | Refills: 3 | Status: SHIPPED | OUTPATIENT
Start: 2020-04-30 | End: 2021-04-27

## 2020-04-30 RX ORDER — PANTOPRAZOLE SODIUM 40 MG/1
TABLET, DELAYED RELEASE ORAL
Qty: 90 TABLET | Refills: 0 | Status: SHIPPED | OUTPATIENT
Start: 2020-04-30 | End: 2020-07-29

## 2020-05-10 DIAGNOSIS — F41.9 ANXIETY: ICD-10-CM

## 2020-05-10 DIAGNOSIS — F32.A DEPRESSIVE DISORDER: ICD-10-CM

## 2020-05-10 RX ORDER — VENLAFAXINE HYDROCHLORIDE 150 MG/1
CAPSULE, EXTENDED RELEASE ORAL
Qty: 90 CAPSULE | Refills: 0 | Status: SHIPPED | OUTPATIENT
Start: 2020-05-10 | End: 2020-08-18

## 2020-05-10 NOTE — TELEPHONE ENCOUNTER
Refilled times 1 but needs appointment before the next prescription will be filled. Please call patient and set up an office visit as overdue for exam to discuss mood, prediabetes. She will need an office visit as we will need to listen to her heart lungs. Inform her we are not seeing sick people in the office but make sure she wears a mask for protection and set up an appointment in the next 1 to 3 weeks. Jones Jimenez

## 2020-05-22 DIAGNOSIS — R73.03 PREDIABETES: ICD-10-CM

## 2020-05-24 RX ORDER — METFORMIN HYDROCHLORIDE 500 MG/1
TABLET, EXTENDED RELEASE ORAL
Qty: 90 TABLET | Refills: 0 | Status: SHIPPED | OUTPATIENT
Start: 2020-05-24 | End: 2020-08-24

## 2020-05-28 NOTE — TELEPHONE ENCOUNTER
Not able to leave a message for the patient mail box is full. If the patient phones back please inform her of the message below.

## 2020-07-27 ENCOUNTER — OFFICE VISIT (OUTPATIENT)
Dept: DERMATOLOGY CLINIC | Facility: CLINIC | Age: 60
End: 2020-07-27
Payer: COMMERCIAL

## 2020-07-27 DIAGNOSIS — D23.5 BENIGN NEOPLASM OF SKIN OF TRUNK, EXCEPT SCROTUM: ICD-10-CM

## 2020-07-27 DIAGNOSIS — D23.60 BENIGN NEOPLASM OF SKIN OF UPPER LIMB, INCLUDING SHOULDER, UNSPECIFIED LATERALITY: ICD-10-CM

## 2020-07-27 DIAGNOSIS — Z80.8 FAMILY HISTORY OF MELANOMA: ICD-10-CM

## 2020-07-27 DIAGNOSIS — D23.30 BENIGN NEOPLASM OF SKIN OF FACE: ICD-10-CM

## 2020-07-27 DIAGNOSIS — Z85.820 PERSONAL HISTORY OF MALIGNANT MELANOMA OF SKIN: ICD-10-CM

## 2020-07-27 DIAGNOSIS — D23.70 BENIGN NEOPLASM OF SKIN OF LOWER LIMB, INCLUDING HIP, UNSPECIFIED LATERALITY: ICD-10-CM

## 2020-07-27 DIAGNOSIS — D23.4 BENIGN NEOPLASM OF SCALP AND SKIN OF NECK: ICD-10-CM

## 2020-07-27 DIAGNOSIS — L82.1 SEBORRHEIC KERATOSES: ICD-10-CM

## 2020-07-27 DIAGNOSIS — D22.9 MULTIPLE NEVI: Primary | ICD-10-CM

## 2020-07-27 PROCEDURE — 99213 OFFICE O/P EST LOW 20 MIN: CPT | Performed by: DERMATOLOGY

## 2020-07-27 PROCEDURE — 99212 OFFICE O/P EST SF 10 MIN: CPT | Performed by: DERMATOLOGY

## 2020-07-27 RX ORDER — AMOXICILLIN 875 MG/1
TABLET, COATED ORAL
COMMUNITY
Start: 2020-06-19 | End: 2020-08-24

## 2020-07-27 RX ORDER — CLINDAMYCIN HYDROCHLORIDE 300 MG/1
CAPSULE ORAL
COMMUNITY
Start: 2020-07-03 | End: 2021-07-01

## 2020-07-27 RX ORDER — HYDROCODONE BITARTRATE AND ACETAMINOPHEN 5; 325 MG/1; MG/1
TABLET ORAL
COMMUNITY
Start: 2020-07-03 | End: 2021-07-01

## 2020-07-29 DIAGNOSIS — E78.2 MIXED HYPERLIPIDEMIA: ICD-10-CM

## 2020-07-29 DIAGNOSIS — K27.9 PUD (PEPTIC ULCER DISEASE): ICD-10-CM

## 2020-07-29 RX ORDER — PANTOPRAZOLE SODIUM 40 MG/1
TABLET, DELAYED RELEASE ORAL
Qty: 30 TABLET | Refills: 0 | Status: SHIPPED | OUTPATIENT
Start: 2020-07-29 | End: 2020-08-24

## 2020-07-29 RX ORDER — ATORVASTATIN CALCIUM 80 MG/1
TABLET, FILM COATED ORAL
Qty: 30 TABLET | Refills: 0 | Status: SHIPPED | OUTPATIENT
Start: 2020-07-29 | End: 2020-08-24

## 2020-08-02 NOTE — PROGRESS NOTES
Harika Francisco is a 64year old female. HPI:     CC:  Patient presents with:  Full Skin Exam: LOV 2/5/20. pt presenting today with full body skin check. pt has HX of Melanoma        Allergies:  Patient has no known allergies.     HISTORY:    Past Medical H by nebulization every 4 (four) hours as needed. 50 vial 0   • guaiFENesin-Codeine 100-10 MG/5ML Oral Syrup Take 1 tsp every 6 hours for cough as needed.  May cause drowsiness 118 mL 0   • Budesonide-Formoterol Fumarate 80-4.5 MCG/ACT Inhalation Aerosol [facility] Sodium (STOOL SOFTENER OR) Take by mouth.        Allergies:   No Known Allergies    Past Medical History:   Diagnosis Date   • Anxiety state, unspecified    • Arthritis    • Asthma    • Borderline diabetes    • Colon polyps    • Decorative tattoo    • Depre file        Gets together: Not on file        Attends Yarsani service: Not on file        Active member of club or organization: Not on file        Attends meetings of clubs or organizations: Not on file        Relationship status: Not on file      Intim and appropriate/relevant lab results including pathology and past body maps reviewed. Updated and new information noted in current visit.      Status post excision of melanoma right posterior shoulder upper back 10/2018.  0.5 mm     fhx skin ca-nmsc father neck  Benign neoplasm of skin of face  Benign neoplasm of skin of lower limb, including hip, unspecified laterality  Benign neoplasm of skin of trunk, except scrotum  Benign neoplasm of skin of upper limb, including shoulder, unspecified laterality  Seborr grid.  Instructions reviewed at length. Benign nevi, seborrheic  keratoses, cherry angiomas:  Reassurance regarding other benign skin lesions. Signs and symptoms of skin cancer, ABCDE's of melanoma discussed with patient.  Sunscreen use, sun protection, s

## 2020-08-06 DIAGNOSIS — F41.9 ANXIETY: ICD-10-CM

## 2020-08-06 DIAGNOSIS — F32.A DEPRESSIVE DISORDER: ICD-10-CM

## 2020-08-12 ENCOUNTER — TELEPHONE (OUTPATIENT)
Dept: FAMILY MEDICINE CLINIC | Facility: CLINIC | Age: 60
End: 2020-08-12

## 2020-08-12 DIAGNOSIS — R73.09 ELEVATED GLUCOSE: ICD-10-CM

## 2020-08-12 DIAGNOSIS — Z00.00 ADULT GENERAL MEDICAL EXAM: Primary | ICD-10-CM

## 2020-08-12 DIAGNOSIS — R73.03 PREDIABETES: ICD-10-CM

## 2020-08-12 NOTE — TELEPHONE ENCOUNTER
Per patient she has an appointment on 08/24/2020 and would like to know if she can do her complete blood work prior to her appointment.

## 2020-08-18 RX ORDER — VENLAFAXINE HYDROCHLORIDE 150 MG/1
CAPSULE, EXTENDED RELEASE ORAL
Qty: 90 CAPSULE | Refills: 0 | Status: SHIPPED | OUTPATIENT
Start: 2020-08-18 | End: 2020-11-10

## 2020-08-20 DIAGNOSIS — R73.03 PREDIABETES: ICD-10-CM

## 2020-08-24 ENCOUNTER — OFFICE VISIT (OUTPATIENT)
Dept: FAMILY MEDICINE CLINIC | Facility: CLINIC | Age: 60
End: 2020-08-24
Payer: COMMERCIAL

## 2020-08-24 ENCOUNTER — TELEPHONE (OUTPATIENT)
Dept: FAMILY MEDICINE CLINIC | Facility: CLINIC | Age: 60
End: 2020-08-24

## 2020-08-24 ENCOUNTER — LAB ENCOUNTER (OUTPATIENT)
Dept: LAB | Age: 60
End: 2020-08-24
Attending: FAMILY MEDICINE
Payer: COMMERCIAL

## 2020-08-24 ENCOUNTER — HOSPITAL ENCOUNTER (OUTPATIENT)
Dept: GENERAL RADIOLOGY | Age: 60
Discharge: HOME | End: 2020-08-24
Attending: FAMILY MEDICINE
Payer: COMMERCIAL

## 2020-08-24 VITALS
DIASTOLIC BLOOD PRESSURE: 72 MMHG | HEIGHT: 69 IN | HEART RATE: 92 BPM | WEIGHT: 204.81 LBS | SYSTOLIC BLOOD PRESSURE: 118 MMHG | TEMPERATURE: 98 F | BODY MASS INDEX: 30.33 KG/M2

## 2020-08-24 DIAGNOSIS — R35.89 POLYURIA: ICD-10-CM

## 2020-08-24 DIAGNOSIS — J41.0 SMOKERS' COUGH (HCC): ICD-10-CM

## 2020-08-24 DIAGNOSIS — J45.21 MILD INTERMITTENT ASTHMA WITH ACUTE EXACERBATION: ICD-10-CM

## 2020-08-24 DIAGNOSIS — R06.2 WHEEZING: ICD-10-CM

## 2020-08-24 DIAGNOSIS — F32.A DEPRESSIVE DISORDER: ICD-10-CM

## 2020-08-24 DIAGNOSIS — R73.03 PREDIABETES: ICD-10-CM

## 2020-08-24 DIAGNOSIS — E78.2 MIXED HYPERLIPIDEMIA: ICD-10-CM

## 2020-08-24 DIAGNOSIS — K27.9 PUD (PEPTIC ULCER DISEASE): ICD-10-CM

## 2020-08-24 DIAGNOSIS — Z00.00 ADULT GENERAL MEDICAL EXAM: ICD-10-CM

## 2020-08-24 DIAGNOSIS — E11.65 UNCONTROLLED TYPE 2 DIABETES MELLITUS WITH HYPERGLYCEMIA, WITHOUT LONG-TERM CURRENT USE OF INSULIN (HCC): Primary | ICD-10-CM

## 2020-08-24 DIAGNOSIS — F41.9 ANXIETY: ICD-10-CM

## 2020-08-24 DIAGNOSIS — R91.1 PULMONARY NODULE: ICD-10-CM

## 2020-08-24 DIAGNOSIS — R63.4 WEIGHT LOSS: ICD-10-CM

## 2020-08-24 DIAGNOSIS — R53.83 LETHARGY: ICD-10-CM

## 2020-08-24 DIAGNOSIS — J30.89 OTHER ALLERGIC RHINITIS: ICD-10-CM

## 2020-08-24 DIAGNOSIS — R73.09 ELEVATED GLUCOSE: ICD-10-CM

## 2020-08-24 DIAGNOSIS — R63.1 POLYDIPSIA: ICD-10-CM

## 2020-08-24 LAB
ALBUMIN SERPL-MCNC: 3.3 G/DL (ref 3.4–5)
ALBUMIN/GLOB SERPL: 0.9 {RATIO} (ref 1–2)
ALP LIVER SERPL-CCNC: 89 U/L (ref 46–118)
ALT SERPL-CCNC: 39 U/L (ref 13–56)
ANION GAP SERPL CALC-SCNC: 4 MMOL/L (ref 0–18)
AST SERPL-CCNC: 21 U/L (ref 15–37)
BASOPHILS # BLD AUTO: 0.02 X10(3) UL (ref 0–0.2)
BASOPHILS NFR BLD AUTO: 0.2 %
BILIRUB SERPL-MCNC: 0.5 MG/DL (ref 0.1–2)
BUN BLD-MCNC: 11 MG/DL (ref 7–18)
BUN/CREAT SERPL: 11 (ref 10–20)
CALCIUM BLD-MCNC: 9.2 MG/DL (ref 8.5–10.1)
CHLORIDE SERPL-SCNC: 100 MMOL/L (ref 98–112)
CHOLEST SMN-MCNC: 181 MG/DL (ref ?–200)
CO2 SERPL-SCNC: 32 MMOL/L (ref 21–32)
CREAT BLD-MCNC: 1 MG/DL (ref 0.55–1.02)
CREAT UR-SCNC: 174 MG/DL
DEPRECATED RDW RBC AUTO: 42.3 FL (ref 35.1–46.3)
EOSINOPHIL # BLD AUTO: 0 X10(3) UL (ref 0–0.7)
EOSINOPHIL NFR BLD AUTO: 0 %
ERYTHROCYTE [DISTWIDTH] IN BLOOD BY AUTOMATED COUNT: 12.1 % (ref 11–15)
EST. AVERAGE GLUCOSE BLD GHB EST-MCNC: 338 MG/DL (ref 68–126)
GLOBULIN PLAS-MCNC: 3.5 G/DL (ref 2.8–4.4)
GLUCOSE BLD-MCNC: 283 MG/DL (ref 70–99)
HBA1C MFR BLD HPLC: 13.4 % (ref ?–5.7)
HCT VFR BLD AUTO: 43 % (ref 35–48)
HDLC SERPL-MCNC: 43 MG/DL (ref 40–59)
HEMOGLOBIN A1C: 14 % (ref 4.3–5.6)
HGB BLD-MCNC: 15.2 G/DL (ref 12–16)
IMM GRANULOCYTES # BLD AUTO: 0.04 X10(3) UL (ref 0–1)
IMM GRANULOCYTES NFR BLD: 0.4 %
LDLC SERPL CALC-MCNC: 89 MG/DL (ref ?–100)
LYMPHOCYTES # BLD AUTO: 2.79 X10(3) UL (ref 1–4)
LYMPHOCYTES NFR BLD AUTO: 24.7 %
M PROTEIN MFR SERPL ELPH: 6.8 G/DL (ref 6.4–8.2)
MCH RBC QN AUTO: 33.9 PG (ref 26–34)
MCHC RBC AUTO-ENTMCNC: 35.3 G/DL (ref 31–37)
MCV RBC AUTO: 96 FL (ref 80–100)
MICROALBUMIN UR-MCNC: 29.6 MG/DL
MICROALBUMIN/CREAT 24H UR-RTO: 170.1 UG/MG (ref ?–30)
MONOCYTES # BLD AUTO: 0.51 X10(3) UL (ref 0.1–1)
MONOCYTES NFR BLD AUTO: 4.5 %
NEUTROPHILS # BLD AUTO: 7.92 X10 (3) UL (ref 1.5–7.7)
NEUTROPHILS # BLD AUTO: 7.92 X10(3) UL (ref 1.5–7.7)
NEUTROPHILS NFR BLD AUTO: 70.2 %
NONHDLC SERPL-MCNC: 138 MG/DL (ref ?–130)
OSMOLALITY SERPL CALC.SUM OF ELEC: 292 MOSM/KG (ref 275–295)
PATIENT FASTING Y/N/NP: YES
PATIENT FASTING Y/N/NP: YES
PLATELET # BLD AUTO: 213 10(3)UL (ref 150–450)
POTASSIUM SERPL-SCNC: 4.2 MMOL/L (ref 3.5–5.1)
RBC # BLD AUTO: 4.48 X10(6)UL (ref 3.8–5.3)
SODIUM SERPL-SCNC: 136 MMOL/L (ref 136–145)
TRIGL SERPL-MCNC: 243 MG/DL (ref 30–149)
TSI SER-ACNC: 1.03 MIU/ML (ref 0.36–3.74)
VLDLC SERPL CALC-MCNC: 49 MG/DL (ref 0–30)
WBC # BLD AUTO: 11.3 X10(3) UL (ref 4–11)

## 2020-08-24 PROCEDURE — 99215 OFFICE O/P EST HI 40 MIN: CPT | Performed by: FAMILY MEDICINE

## 2020-08-24 PROCEDURE — 80053 COMPREHEN METABOLIC PANEL: CPT

## 2020-08-24 PROCEDURE — 80061 LIPID PANEL: CPT

## 2020-08-24 PROCEDURE — 3074F SYST BP LT 130 MM HG: CPT | Performed by: FAMILY MEDICINE

## 2020-08-24 PROCEDURE — 83036 HEMOGLOBIN GLYCOSYLATED A1C: CPT

## 2020-08-24 PROCEDURE — 71046 X-RAY EXAM CHEST 2 VIEWS: CPT | Performed by: FAMILY MEDICINE

## 2020-08-24 PROCEDURE — 36416 COLLJ CAPILLARY BLOOD SPEC: CPT | Performed by: FAMILY MEDICINE

## 2020-08-24 PROCEDURE — 85025 COMPLETE CBC W/AUTO DIFF WBC: CPT

## 2020-08-24 PROCEDURE — 82570 ASSAY OF URINE CREATININE: CPT

## 2020-08-24 PROCEDURE — 36415 COLL VENOUS BLD VENIPUNCTURE: CPT

## 2020-08-24 PROCEDURE — 83036 HEMOGLOBIN GLYCOSYLATED A1C: CPT | Performed by: FAMILY MEDICINE

## 2020-08-24 PROCEDURE — 3078F DIAST BP <80 MM HG: CPT | Performed by: FAMILY MEDICINE

## 2020-08-24 PROCEDURE — 82043 UR ALBUMIN QUANTITATIVE: CPT

## 2020-08-24 PROCEDURE — 84443 ASSAY THYROID STIM HORMONE: CPT

## 2020-08-24 PROCEDURE — 3008F BODY MASS INDEX DOCD: CPT | Performed by: FAMILY MEDICINE

## 2020-08-24 PROCEDURE — 99212 OFFICE O/P EST SF 10 MIN: CPT | Performed by: FAMILY MEDICINE

## 2020-08-24 RX ORDER — BUDESONIDE AND FORMOTEROL FUMARATE DIHYDRATE 80; 4.5 UG/1; UG/1
2 AEROSOL RESPIRATORY (INHALATION) 2 TIMES DAILY
Qty: 3 INHALER | Refills: 1 | Status: SHIPPED | OUTPATIENT
Start: 2020-08-24 | End: 2021-07-01

## 2020-08-24 RX ORDER — ATORVASTATIN CALCIUM 80 MG/1
80 TABLET, FILM COATED ORAL NIGHTLY
Qty: 90 TABLET | Refills: 1 | Status: SHIPPED | OUTPATIENT
Start: 2020-08-24 | End: 2022-02-28

## 2020-08-24 RX ORDER — PANTOPRAZOLE SODIUM 40 MG/1
40 TABLET, DELAYED RELEASE ORAL DAILY
Qty: 90 TABLET | Refills: 1 | Status: SHIPPED | OUTPATIENT
Start: 2020-08-24 | End: 2022-02-28

## 2020-08-24 RX ORDER — METFORMIN HYDROCHLORIDE 500 MG/1
1000 TABLET, EXTENDED RELEASE ORAL 2 TIMES DAILY WITH MEALS
Qty: 360 TABLET | Refills: 1 | Status: SHIPPED | OUTPATIENT
Start: 2020-08-24 | End: 2021-03-26

## 2020-08-24 NOTE — TELEPHONE ENCOUNTER
Per patient she just saw Dr Irwin today and forgot to tell him that all of her medications needs refill and needs to send to mail in pharmacy for 90 days supply.

## 2020-08-24 NOTE — PROGRESS NOTES
Patient ID: Lissy Urrutia is a 64year old female. HPI  Patient presents with:  Physical    Pt came in for a physical today but had multiple complaints consistent with uncontrolled diabetes.  We will treat this appointment as a diabetic exam, and have inspiring or expiring. Pt takes coQ 10 and magnesium supplements. I reviewed the pt's labs. Her last A1C was 6.6 on 8/9/2019. We rechecked her A1C today and it was greater than 14.     Asthma Action Plan due on 02/22/1960  Asthma Control Test due Results   Component Value Date     (H) 08/24/2020    BUN 11 08/24/2020    CREATSERUM 1.00 08/24/2020    BUNCREA 11.0 08/24/2020    ANIONGAP 4 08/24/2020    GFRAA 72 08/24/2020    GFRNAA 62 08/24/2020    CA 9.2 08/24/2020     08/24/2020    K 4. kg/m²  10/25/19 : 34.91 kg/m²  10/02/19 : 34.38 kg/m²  08/22/19 : 34.26 kg/m²  06/13/19 : 34.11 kg/m²      BP Readings from Last 6 Encounters:  08/24/20 : 118/72  10/30/19 : 155/83  10/25/19 : 139/79  10/02/19 : 135/81  08/22/19 : 137/74  06/03/19 : 125/76 on file    Occupational History      Occupation: Office work, purchasing      Occupation: Office work, telephone orders    Social Needs      Financial resource strain: Not on file      Food insecurity:        Worry: Not on file        Inability: Not on bobby Diet: Not Asked        Back Care: Not Asked        Exercise: Not Asked        Bike Helmet: Not Asked        Seat Belt: Not Asked        Self-Exams: Not Asked    Social History Narrative      Not on file         Current Outpatient Medications   Medication S needed for Wheezing or Shortness of Breath. 3 Inhaler 0   • Potassium (POTASSIMIN OR) Take by mouth. • Multiple Vitamins-Minerals (MULTIVITAMIN ADULT OR) Take by mouth.      • ANUCORT-HC 25 MG Rectal Suppos UNW AND I 1 SUP REC BID  1   • Coenzyme Q10 (C tablets (1,000 mg total) by mouth 2 (two) times daily with meals. -     ertugliflozin (STEGLATRO) 15 MG Oral Tab tablet; Take 1 tablet (15 mg total) by mouth daily. for diabetes. Fingerstick hemoglobin A1c was greater than 14.   We bumped up the metformin try to quit smoking but I understand that she is under somewhat stress but still I think she needs to give her son an ultimatum and she states by the end of this year she will try to get him out of her house as she is enabling him unfortunately and he is a

## 2020-08-26 RX ORDER — METFORMIN HYDROCHLORIDE 500 MG/1
TABLET, EXTENDED RELEASE ORAL
Qty: 90 TABLET | Refills: 3 | OUTPATIENT
Start: 2020-08-26

## 2020-08-30 ENCOUNTER — HOSPITAL ENCOUNTER (OUTPATIENT)
Dept: CT IMAGING | Facility: HOSPITAL | Age: 60
Discharge: HOME | End: 2020-08-30
Attending: FAMILY MEDICINE
Payer: COMMERCIAL

## 2020-08-30 DIAGNOSIS — R91.1 PULMONARY NODULE: ICD-10-CM

## 2020-08-30 DIAGNOSIS — R06.2 WHEEZING: ICD-10-CM

## 2020-08-30 DIAGNOSIS — J41.0 SMOKERS' COUGH (HCC): ICD-10-CM

## 2020-08-30 PROCEDURE — 71250 CT THORAX DX C-: CPT | Performed by: FAMILY MEDICINE

## 2020-09-02 ENCOUNTER — TELEPHONE (OUTPATIENT)
Dept: GASTROENTEROLOGY | Facility: CLINIC | Age: 60
End: 2020-09-02

## 2020-09-02 NOTE — TELEPHONE ENCOUNTER
----- Message from Tipton Josee, RN sent at 10/6/2015  1:20 PM CDT -----  Regarding: Recall Colon  Recall colon in 5 years per PL.  Colon done 10/2/15

## 2020-09-08 ENCOUNTER — LAB ENCOUNTER (OUTPATIENT)
Dept: LAB | Age: 60
End: 2020-09-08
Attending: FAMILY MEDICINE
Payer: COMMERCIAL

## 2020-09-08 ENCOUNTER — OFFICE VISIT (OUTPATIENT)
Dept: FAMILY MEDICINE CLINIC | Facility: CLINIC | Age: 60
End: 2020-09-08
Payer: COMMERCIAL

## 2020-09-08 VITALS
BODY MASS INDEX: 29.98 KG/M2 | HEART RATE: 95 BPM | SYSTOLIC BLOOD PRESSURE: 129 MMHG | TEMPERATURE: 97 F | WEIGHT: 202.38 LBS | DIASTOLIC BLOOD PRESSURE: 70 MMHG | HEIGHT: 69 IN

## 2020-09-08 DIAGNOSIS — I25.10 CORONARY ARTERY CALCIFICATION SEEN ON CAT SCAN: ICD-10-CM

## 2020-09-08 DIAGNOSIS — E04.1 NODULE OF LEFT LOBE OF THYROID GLAND: ICD-10-CM

## 2020-09-08 DIAGNOSIS — F17.200 TOBACCO USE DISORDER: ICD-10-CM

## 2020-09-08 DIAGNOSIS — E78.2 MIXED HYPERLIPIDEMIA: ICD-10-CM

## 2020-09-08 DIAGNOSIS — E11.65 UNCONTROLLED TYPE 2 DIABETES MELLITUS WITH HYPERGLYCEMIA, WITHOUT LONG-TERM CURRENT USE OF INSULIN (HCC): Primary | ICD-10-CM

## 2020-09-08 DIAGNOSIS — E11.65 UNCONTROLLED TYPE 2 DIABETES MELLITUS WITH HYPERGLYCEMIA, WITHOUT LONG-TERM CURRENT USE OF INSULIN (HCC): ICD-10-CM

## 2020-09-08 DIAGNOSIS — R91.8 PULMONARY NODULES: ICD-10-CM

## 2020-09-08 DIAGNOSIS — R06.2 WHEEZING: ICD-10-CM

## 2020-09-08 DIAGNOSIS — R93.89 ABNORMAL CT OF THE CHEST: ICD-10-CM

## 2020-09-08 LAB
ANION GAP SERPL CALC-SCNC: 8 MMOL/L (ref 0–18)
BUN BLD-MCNC: 16 MG/DL (ref 7–18)
BUN/CREAT SERPL: 14.2 (ref 10–20)
CALCIUM BLD-MCNC: 10 MG/DL (ref 8.5–10.1)
CHLORIDE SERPL-SCNC: 105 MMOL/L (ref 98–112)
CO2 SERPL-SCNC: 26 MMOL/L (ref 21–32)
CREAT BLD-MCNC: 1.13 MG/DL (ref 0.55–1.02)
GLUCOSE BLD-MCNC: 237 MG/DL (ref 70–99)
OSMOLALITY SERPL CALC.SUM OF ELEC: 297 MOSM/KG (ref 275–295)
PATIENT FASTING Y/N/NP: NO
POTASSIUM SERPL-SCNC: 4.4 MMOL/L (ref 3.5–5.1)
SODIUM SERPL-SCNC: 139 MMOL/L (ref 136–145)

## 2020-09-08 PROCEDURE — 3074F SYST BP LT 130 MM HG: CPT | Performed by: FAMILY MEDICINE

## 2020-09-08 PROCEDURE — 80048 BASIC METABOLIC PNL TOTAL CA: CPT

## 2020-09-08 PROCEDURE — 3078F DIAST BP <80 MM HG: CPT | Performed by: FAMILY MEDICINE

## 2020-09-08 PROCEDURE — 3008F BODY MASS INDEX DOCD: CPT | Performed by: FAMILY MEDICINE

## 2020-09-08 PROCEDURE — 99215 OFFICE O/P EST HI 40 MIN: CPT | Performed by: FAMILY MEDICINE

## 2020-09-08 PROCEDURE — 99212 OFFICE O/P EST SF 10 MIN: CPT | Performed by: FAMILY MEDICINE

## 2020-09-08 PROCEDURE — 36415 COLL VENOUS BLD VENIPUNCTURE: CPT

## 2020-09-08 NOTE — PROGRESS NOTES
Patient ID: Celi Jewell is a 64year old female. HPI  Patient presents with:  Diabetes: 2 weeks follow up    Last seen by me on 8/24/2020. At our last visit, we increased her metformin to 1000mg BID and started her on 15mg Steglatro once daily. size. Iterative   reconstruction technique for dose reduction was employed. FINDINGS:          COMMENT:       Evaluation of the vasculature and fabien is suboptimal in the absence of contrast infusion. CARDIAC:         The heart is not enlarged.   Bari pulmonology assessment is recommended. Surveillance chest CT in 6 months should also be considered to assess evolution. 2. Secretions and/or debris throughout the tracheobronchial tree. 3. Coronary artery calcification.   4. Exophytic 2.7 cm left infer Finalized by (CST): Gleen Oppenheim, MD on 8/24/2020 at 2:13 PM          ========================================================================              Lab Results   Component Value Date    WBC 11.3 (H) 08/24/2020    RBC 4.48 08/24/2020    HGB 15.2 08 BILUR Negative 07/28/2015    KETUR Negative 07/28/2015    BLOODURINE Moderate (A) 07/28/2015    PHURINE 6 01/08/2019    PROUR Negative 07/28/2015    UROBILINOGEN <2.0 07/28/2015    NITRITE neg 01/08/2019    LEUUR Negative 07/28/2015    ASCACIDURINE Negativ Negative for dizziness and numbness.            Medical History:      Past Medical History:   Diagnosis Date   • Anxiety state, unspecified    • Arthritis    • Asthma    • Borderline diabetes    • Colon polyps    • Decorative tattoo    • Depression    • Eso Take by mouth. • Multiple Vitamins-Minerals (MULTIVITAMIN ADULT OR) Take by mouth. • ANUCORT-HC 25 MG Rectal Suppos UNW AND I 1 SUP REC BID  1   • Coenzyme Q10 (CO Q 10 OR) Take by mouth.      • Misc Natural Products (GLUCOSAMINE CHOND COMPLEX/MSM O affect her. Pulmonary nodules  -     PULMONARY - INTERNAL  We will see pulmonology. Abnormal CT of the chest  -     PULMONARY - INTERNAL    Wheezing  -     PULMONARY - INTERNAL  Last visit she had some wheezing.   Tobacco use disorder  -     PULMONARY - I DO Larissa,  personally performed the services described in this documentation. All medical record entries made by the scribe were at my direction and in my presence.   I have reviewed the chart and discharge instructions (if applicable) and agree that the

## 2020-09-30 ENCOUNTER — OFFICE VISIT (OUTPATIENT)
Dept: PULMONOLOGY | Facility: CLINIC | Age: 60
End: 2020-09-30
Payer: COMMERCIAL

## 2020-09-30 VITALS
HEIGHT: 68 IN | DIASTOLIC BLOOD PRESSURE: 79 MMHG | WEIGHT: 203 LBS | HEART RATE: 93 BPM | RESPIRATION RATE: 18 BRPM | SYSTOLIC BLOOD PRESSURE: 119 MMHG | BODY MASS INDEX: 30.77 KG/M2 | OXYGEN SATURATION: 96 % | TEMPERATURE: 97 F

## 2020-09-30 DIAGNOSIS — R05.9 COUGH: ICD-10-CM

## 2020-09-30 DIAGNOSIS — J45.41 MODERATE PERSISTENT ASTHMA WITH ACUTE EXACERBATION: ICD-10-CM

## 2020-09-30 DIAGNOSIS — F17.200 TOBACCO USE DISORDER: ICD-10-CM

## 2020-09-30 DIAGNOSIS — R93.89 ABNORMAL CT OF THE CHEST: Primary | ICD-10-CM

## 2020-09-30 PROCEDURE — 3008F BODY MASS INDEX DOCD: CPT | Performed by: INTERNAL MEDICINE

## 2020-09-30 PROCEDURE — 99212 OFFICE O/P EST SF 10 MIN: CPT | Performed by: INTERNAL MEDICINE

## 2020-09-30 PROCEDURE — 3078F DIAST BP <80 MM HG: CPT | Performed by: INTERNAL MEDICINE

## 2020-09-30 PROCEDURE — 99204 OFFICE O/P NEW MOD 45 MIN: CPT | Performed by: INTERNAL MEDICINE

## 2020-09-30 PROCEDURE — 3074F SYST BP LT 130 MM HG: CPT | Performed by: INTERNAL MEDICINE

## 2020-09-30 RX ORDER — ERTUGLIFLOZIN 15 MG/1
TABLET, FILM COATED ORAL
COMMUNITY
Start: 2020-09-25 | End: 2021-02-22

## 2020-09-30 RX ORDER — ALBUTEROL SULFATE 90 UG/1
2 AEROSOL, METERED RESPIRATORY (INHALATION) 4 TIMES DAILY PRN
Qty: 1 INHALER | Refills: 5 | Status: SHIPPED | OUTPATIENT
Start: 2020-09-30 | End: 2021-07-01

## 2020-09-30 NOTE — PROGRESS NOTES
Pulmonary Consult Note    HPI:   Jonas Mayers is a 64year old female with No chief complaint on file.     Candelario Melendrez, DO    Abnl CT chest  Pt states nodules in lungs  CXR was abnl and CT done in f/u    Pt states \"wheeze from [place]"    Denies copd Surgical History:   Procedure Laterality Date   • BIOPSY OF SKIN LESION Right 11/08/2018    melanoma upper back   • BREAST BIOPSY Right 1987    skin cyst   • BREAST BIOPSY Left 2018    skin cyst   • COLONOSCOPY  10/02/2015   • EXCIS BARTHOLIN GLAND/CYST Le Allergies:  No Known Allergies    Social History:  Social History    Socioeconomic History      Marital status:       Spouse name: Not on file      Number of children: 1      Years of education: Not on file      Highest education level: Not acute/subacute hypersensitivity pneumonitis, sarcoidosis, or pulmonary vasculitis amongst other etiologies. Formal pulmonology assessment is recommended. Surveillance chest CT in 6 months should also be considered to assess evolution.   2. Secretions an

## 2020-10-01 ENCOUNTER — HOSPITAL ENCOUNTER (OUTPATIENT)
Dept: ULTRASOUND IMAGING | Age: 60
Discharge: HOME | End: 2020-10-01
Attending: FAMILY MEDICINE
Payer: COMMERCIAL

## 2020-10-01 ENCOUNTER — LAB ENCOUNTER (OUTPATIENT)
Dept: LAB | Age: 60
End: 2020-10-01
Attending: INTERNAL MEDICINE
Payer: COMMERCIAL

## 2020-10-01 DIAGNOSIS — E04.1 NODULE OF LEFT LOBE OF THYROID GLAND: ICD-10-CM

## 2020-10-01 DIAGNOSIS — J45.41 MODERATE PERSISTENT ASTHMA WITH ACUTE EXACERBATION: ICD-10-CM

## 2020-10-01 DIAGNOSIS — R05.9 COUGH: ICD-10-CM

## 2020-10-01 DIAGNOSIS — R93.89 ABNORMAL CT OF THE CHEST: ICD-10-CM

## 2020-10-01 DIAGNOSIS — N28.9 RENAL INSUFFICIENCY: ICD-10-CM

## 2020-10-01 DIAGNOSIS — F17.200 TOBACCO USE DISORDER: ICD-10-CM

## 2020-10-01 DIAGNOSIS — E11.65 UNCONTROLLED TYPE 2 DIABETES MELLITUS WITH HYPERGLYCEMIA, WITHOUT LONG-TERM CURRENT USE OF INSULIN (HCC): ICD-10-CM

## 2020-10-01 PROCEDURE — 86235 NUCLEAR ANTIGEN ANTIBODY: CPT

## 2020-10-01 PROCEDURE — 85652 RBC SED RATE AUTOMATED: CPT

## 2020-10-01 PROCEDURE — 86200 CCP ANTIBODY: CPT

## 2020-10-01 PROCEDURE — 83036 HEMOGLOBIN GLYCOSYLATED A1C: CPT

## 2020-10-01 PROCEDURE — 83516 IMMUNOASSAY NONANTIBODY: CPT

## 2020-10-01 PROCEDURE — 86038 ANTINUCLEAR ANTIBODIES: CPT

## 2020-10-01 PROCEDURE — 86225 DNA ANTIBODY NATIVE: CPT

## 2020-10-01 PROCEDURE — 36415 COLL VENOUS BLD VENIPUNCTURE: CPT

## 2020-10-01 PROCEDURE — 86255 FLUORESCENT ANTIBODY SCREEN: CPT

## 2020-10-01 PROCEDURE — 83876 ASSAY MYELOPEROXIDASE: CPT

## 2020-10-01 PROCEDURE — 86431 RHEUMATOID FACTOR QUANT: CPT

## 2020-10-01 PROCEDURE — 76536 US EXAM OF HEAD AND NECK: CPT | Performed by: FAMILY MEDICINE

## 2020-10-01 PROCEDURE — 80048 BASIC METABOLIC PNL TOTAL CA: CPT

## 2020-10-01 PROCEDURE — 80061 LIPID PANEL: CPT

## 2020-10-08 ENCOUNTER — OFFICE VISIT (OUTPATIENT)
Dept: OPHTHALMOLOGY | Facility: CLINIC | Age: 60
End: 2020-10-08
Payer: COMMERCIAL

## 2020-10-08 DIAGNOSIS — H25.13 AGE-RELATED NUCLEAR CATARACT OF BOTH EYES: ICD-10-CM

## 2020-10-08 DIAGNOSIS — E11.9 TYPE 2 DIABETES MELLITUS WITHOUT RETINOPATHY (HCC): Primary | ICD-10-CM

## 2020-10-08 DIAGNOSIS — H52.13 MYOPIA WITH PRESBYOPIA OF BOTH EYES: ICD-10-CM

## 2020-10-08 DIAGNOSIS — E11.65 UNCONTROLLED TYPE 2 DIABETES MELLITUS WITH HYPERGLYCEMIA, WITHOUT LONG-TERM CURRENT USE OF INSULIN (HCC): Primary | ICD-10-CM

## 2020-10-08 DIAGNOSIS — H52.4 MYOPIA WITH PRESBYOPIA OF BOTH EYES: ICD-10-CM

## 2020-10-08 PROCEDURE — 92015 DETERMINE REFRACTIVE STATE: CPT | Performed by: OPHTHALMOLOGY

## 2020-10-08 PROCEDURE — 99202 OFFICE O/P NEW SF 15 MIN: CPT | Performed by: OPHTHALMOLOGY

## 2020-10-08 PROCEDURE — 99203 OFFICE O/P NEW LOW 30 MIN: CPT | Performed by: OPHTHALMOLOGY

## 2020-10-08 NOTE — PROGRESS NOTES
Mike Angulo is a 64year old female.     HPI:     HPI     Consult      Additional comments: Consult per Dr. Lafaye Dubin              Diabetic Eye Exam      Additional comments: Pt has been a diabetic for 2 months       Pt's diabetes is currently controlled by 1.00        Years: 40.00        Pack years: 40        Types: Cigarettes      Smokeless tobacco: Former User      Tobacco comment: hx: quit 2007, currently using vapor    Alcohol use:  Yes      Alcohol/week: 0.0 standard drinks      Comment: rarely    Drug u Docusate Sodium (STOOL SOFTENER OR) Take by mouth.          Allergies:  No Known Allergies    ROS:     ROS     Positive for: Endocrine, Eyes    Negative for: Constitutional, Gastrointestinal, Neurological, Skin, Genitourinary, Musculoskeletal, HENT, Cardiov +2.50 20/20          Final Rx       Sphere Cylinder Dist [facility]    Right -1.75 Sphere 20/20    Left -2.00 Sphere 20/20    Type: Distance only                 ASSESSMENT/PLAN:     Diagnoses and Plan:     Type 2 diabetes mellitus without retinopathy ([facility] [address]  Diabe

## 2020-10-08 NOTE — PATIENT INSTRUCTIONS
Type 2 diabetes mellitus without retinopathy ([facility] [address]  Diabetes type II: no background of retinopathy, no signs of neovascularization noted. Discussed ocular and systemic benefits of blood sugar control.   Diagnosis and treatment discussed in detail with viky

## 2020-11-04 NOTE — IMAGING NOTE
Left voicemail for patient regarding arrival time and location, and med review. Call back number given with questions or concerns.

## 2020-11-06 ENCOUNTER — LAB ENCOUNTER (OUTPATIENT)
Dept: LAB | Age: 60
End: 2020-11-06
Attending: FAMILY MEDICINE
Payer: COMMERCIAL

## 2020-11-06 DIAGNOSIS — E04.1 NODULE OF LEFT LOBE OF THYROID GLAND: ICD-10-CM

## 2020-11-06 DIAGNOSIS — R05.9 COUGH: ICD-10-CM

## 2020-11-06 DIAGNOSIS — R93.89 ABNORMAL CT OF THE CHEST: ICD-10-CM

## 2020-11-09 ENCOUNTER — HOSPITAL ENCOUNTER (OUTPATIENT)
Dept: ULTRASOUND IMAGING | Facility: HOSPITAL | Age: 60
Discharge: HOME | End: 2020-11-09
Attending: FAMILY MEDICINE
Payer: COMMERCIAL

## 2020-11-09 ENCOUNTER — HOSPITAL ENCOUNTER (OUTPATIENT)
Dept: RESPIRATORY THERAPY | Facility: HOSPITAL | Age: 60
Discharge: HOME | End: 2020-11-09
Attending: INTERNAL MEDICINE
Payer: COMMERCIAL

## 2020-11-09 DIAGNOSIS — E04.1 NODULE OF LEFT LOBE OF THYROID GLAND: ICD-10-CM

## 2020-11-09 DIAGNOSIS — J45.41 MODERATE PERSISTENT ASTHMA WITH ACUTE EXACERBATION: ICD-10-CM

## 2020-11-09 DIAGNOSIS — R93.89 ABNORMAL CT OF THE CHEST: ICD-10-CM

## 2020-11-09 DIAGNOSIS — R05.9 COUGH: ICD-10-CM

## 2020-11-09 DIAGNOSIS — F17.200 TOBACCO USE DISORDER: ICD-10-CM

## 2020-11-09 PROCEDURE — 94729 DIFFUSING CAPACITY: CPT | Performed by: INTERNAL MEDICINE

## 2020-11-09 PROCEDURE — 88177 CYTP FNA EVAL EA ADDL: CPT | Performed by: FAMILY MEDICINE

## 2020-11-09 PROCEDURE — 10005 FNA BX W/US GDN 1ST LES: CPT | Performed by: FAMILY MEDICINE

## 2020-11-09 PROCEDURE — 94726 PLETHYSMOGRAPHY LUNG VOLUMES: CPT | Performed by: INTERNAL MEDICINE

## 2020-11-09 PROCEDURE — 88172 CYTP DX EVAL FNA 1ST EA SITE: CPT | Performed by: FAMILY MEDICINE

## 2020-11-09 PROCEDURE — 88173 CYTOPATH EVAL FNA REPORT: CPT | Performed by: FAMILY MEDICINE

## 2020-11-09 PROCEDURE — 94060 EVALUATION OF WHEEZING: CPT | Performed by: INTERNAL MEDICINE

## 2020-11-09 NOTE — IMAGING NOTE
1400 Pt arrived to Vascular room 2    1402 History taken and as follows: Nodules seen on chest x-ray; follow up Ultrasound confirmed.       1405  Scans taken by [facility] ultrasound  sonographer     1408 128/54 HR 86    1409 Procedure explained questions an

## 2020-11-10 DIAGNOSIS — F32.A DEPRESSIVE DISORDER: ICD-10-CM

## 2020-11-10 DIAGNOSIS — E04.1 THYROID NODULE: Primary | ICD-10-CM

## 2020-11-10 DIAGNOSIS — F41.9 ANXIETY: ICD-10-CM

## 2020-11-10 RX ORDER — VENLAFAXINE HYDROCHLORIDE 150 MG/1
CAPSULE, EXTENDED RELEASE ORAL
Qty: 90 CAPSULE | Refills: 3 | Status: SHIPPED | OUTPATIENT
Start: 2020-11-10 | End: 2021-11-05

## 2020-11-12 ENCOUNTER — OFFICE VISIT (OUTPATIENT)
Dept: PULMONOLOGY | Facility: CLINIC | Age: 60
End: 2020-11-12
Payer: COMMERCIAL

## 2020-11-12 VITALS
DIASTOLIC BLOOD PRESSURE: 65 MMHG | OXYGEN SATURATION: 99 % | SYSTOLIC BLOOD PRESSURE: 105 MMHG | HEART RATE: 90 BPM | HEIGHT: 69 IN | TEMPERATURE: 98 F | BODY MASS INDEX: 30.36 KG/M2 | WEIGHT: 205 LBS | RESPIRATION RATE: 18 BRPM

## 2020-11-12 DIAGNOSIS — J43.2 CENTRILOBULAR EMPHYSEMA (HCC): ICD-10-CM

## 2020-11-12 DIAGNOSIS — J84.9 ILD (INTERSTITIAL LUNG DISEASE) (HCC): Primary | ICD-10-CM

## 2020-11-12 DIAGNOSIS — Z72.0 TOBACCO ABUSE: ICD-10-CM

## 2020-11-12 PROCEDURE — 90471 IMMUNIZATION ADMIN: CPT | Performed by: INTERNAL MEDICINE

## 2020-11-12 PROCEDURE — 99212 OFFICE O/P EST SF 10 MIN: CPT | Performed by: INTERNAL MEDICINE

## 2020-11-12 PROCEDURE — 3074F SYST BP LT 130 MM HG: CPT | Performed by: INTERNAL MEDICINE

## 2020-11-12 PROCEDURE — 99214 OFFICE O/P EST MOD 30 MIN: CPT | Performed by: INTERNAL MEDICINE

## 2020-11-12 PROCEDURE — 3078F DIAST BP <80 MM HG: CPT | Performed by: INTERNAL MEDICINE

## 2020-11-12 PROCEDURE — 3008F BODY MASS INDEX DOCD: CPT | Performed by: INTERNAL MEDICINE

## 2020-11-12 PROCEDURE — 90686 IIV4 VACC NO PRSV 0.5 ML IM: CPT | Performed by: INTERNAL MEDICINE

## 2020-11-12 NOTE — PROGRESS NOTES
Pulmonary Follow Up Note    HPI:   Mariah Ferguson is a 64year old female with Patient presents with:   Follow - Up: 6 weeks f/up    Juanito Dias, DO    Less sob  Occ wheeze  Post nasal gtt  Cough improved    Quit smoking  10/10/20          PMH:  Past Medi Budesonide-Formoterol Fumarate 80-4.5 MCG/ACT Inhalation Aerosol Inhale 2 puffs into the lungs 2 (two) times daily.  3 Inhaler 1   • Clindamycin HCl 300 MG Oral Cap TK 1 C PO Q 12 H UNTIL GONE     • HYDROcodone-acetaminophen 5-325 MG Oral Tab TK 1 T PO Q 4- PHYSICAL EXAM:   /65   Pulse 90   Temp 98.2 °F (36.8 °C)   Resp 18   Ht 5' 9\" (1.753 m)   Wt 205 lb (93 kg)   SpO2 99%   BMI 30.27 kg/m²   NAD  A&Ox3  Head AT/NC  Anicteric  Lung cta  CV reg  Abd soft NT/ND  Ext No edema  Skin No rash  Psy

## 2020-11-12 NOTE — PATIENT INSTRUCTIONS
Nicotrol Inhaler. Erilene Auburnjhony Fuller Auburnjhony Hwanglenfidel Auburn If needed :)    How to use:  Pull top off. Press cartridge firmly until seal breaks. Align marks to close. Inhale using a short puffing or sipping action. Do not inhale into lungs. Contains about 200 puffs.

## 2020-11-14 DIAGNOSIS — E04.1 THYROID NODULE: Primary | ICD-10-CM

## 2020-11-14 NOTE — ADDENDUM NOTE
Encounter addended by: John Howard MD on: 11/13/2020 6:28 PM   Actions taken: Clinical Note Signed, Charge Capture section accepted

## 2020-11-14 NOTE — PROCEDURES
[facility] [place]    Patient's Name Matt Pizarro MRN [id number]    1960 Pulmonologist Aurora Mckinney MD   [facility] RESPIRATORY THERAPY PCP Margie Raymond DO     IMPRESSION:    The PFTs are Abnormal.    There is mild air

## 2020-11-19 ENCOUNTER — TELEPHONE (OUTPATIENT)
Dept: PULMONOLOGY | Facility: CLINIC | Age: 60
End: 2020-11-19

## 2020-12-28 ENCOUNTER — TELEPHONE (OUTPATIENT)
Dept: PULMONOLOGY | Facility: CLINIC | Age: 60
End: 2020-12-28

## 2021-02-01 ENCOUNTER — OFFICE VISIT (OUTPATIENT)
Dept: DERMATOLOGY CLINIC | Facility: CLINIC | Age: 61
End: 2021-02-01
Payer: COMMERCIAL

## 2021-02-01 DIAGNOSIS — D23.5 BENIGN NEOPLASM OF SKIN OF TRUNK, EXCEPT SCROTUM: ICD-10-CM

## 2021-02-01 DIAGNOSIS — Z80.8 FAMILY HISTORY OF MELANOMA: ICD-10-CM

## 2021-02-01 DIAGNOSIS — D23.70 BENIGN NEOPLASM OF SKIN OF LOWER LIMB, INCLUDING HIP, UNSPECIFIED LATERALITY: ICD-10-CM

## 2021-02-01 DIAGNOSIS — Z85.820 PERSONAL HISTORY OF MALIGNANT MELANOMA OF SKIN: ICD-10-CM

## 2021-02-01 DIAGNOSIS — D23.30 BENIGN NEOPLASM OF SKIN OF FACE: ICD-10-CM

## 2021-02-01 DIAGNOSIS — D23.4 BENIGN NEOPLASM OF SCALP AND SKIN OF NECK: ICD-10-CM

## 2021-02-01 DIAGNOSIS — D48.5 NEOPLASM OF UNCERTAIN BEHAVIOR OF SKIN: Primary | ICD-10-CM

## 2021-02-01 DIAGNOSIS — D22.9 MULTIPLE NEVI: ICD-10-CM

## 2021-02-01 DIAGNOSIS — L82.1 SEBORRHEIC KERATOSES: ICD-10-CM

## 2021-02-01 DIAGNOSIS — D23.60 BENIGN NEOPLASM OF SKIN OF UPPER LIMB, INCLUDING SHOULDER, UNSPECIFIED LATERALITY: ICD-10-CM

## 2021-02-01 PROCEDURE — 11105 PUNCH BX SKIN EA SEP/ADDL: CPT | Performed by: DERMATOLOGY

## 2021-02-01 PROCEDURE — 11104 PUNCH BX SKIN SINGLE LESION: CPT | Performed by: DERMATOLOGY

## 2021-02-01 PROCEDURE — 99213 OFFICE O/P EST LOW 20 MIN: CPT | Performed by: DERMATOLOGY

## 2021-02-01 RX ORDER — BLOOD SUGAR DIAGNOSTIC
STRIP MISCELLANEOUS
COMMUNITY
Start: 2020-11-09

## 2021-02-01 RX ORDER — LANCETS 30 GAUGE
EACH MISCELLANEOUS
COMMUNITY
Start: 2020-11-09

## 2021-02-01 RX ORDER — BLOOD-GLUCOSE METER
EACH MISCELLANEOUS
COMMUNITY
Start: 2020-09-08

## 2021-02-06 NOTE — PROGRESS NOTES
The pathology report from last visit showed A - right posterior shoulder mildly atypical lentiginous junctional nevus and inflamed sk, margins negative--this should not need any further treatment at this time.   B from mid back mild-moderately atypica compo

## 2021-02-08 NOTE — PROGRESS NOTES
Operative Report                                                                      Punch Biopsy      Clinical diagnosis: Atypical nevi  Size of lesion: A–3 mm, B- 4 mm  Location:
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
## 2021-02-10 NOTE — PROGRESS NOTES
Results logged in path book. Patient informed of test results and all KMT's recommendations. Voiced understanding.  appts made

## 2021-02-15 ENCOUNTER — NURSE ONLY (OUTPATIENT)
Dept: DERMATOLOGY CLINIC | Facility: CLINIC | Age: 61
End: 2021-02-15
Payer: COMMERCIAL

## 2021-02-22 RX ORDER — ERTUGLIFLOZIN 15 MG/1
15 TABLET, FILM COATED ORAL DAILY
Qty: 30 TABLET | Refills: 0 | Status: SHIPPED | OUTPATIENT
Start: 2021-02-22 | End: 2021-03-26

## 2021-02-26 NOTE — TELEPHONE ENCOUNTER
2nd Attempt - No Answer, Unable to [employer], Line Just Rings and Rings.  Eventually it Goes to a [employer]

## 2021-03-03 ENCOUNTER — OFFICE VISIT (OUTPATIENT)
Dept: DERMATOLOGY CLINIC | Facility: CLINIC | Age: 61
End: 2021-03-03
Payer: COMMERCIAL

## 2021-03-03 VITALS — SYSTOLIC BLOOD PRESSURE: 127 MMHG | DIASTOLIC BLOOD PRESSURE: 82 MMHG

## 2021-03-03 DIAGNOSIS — Z86.018 HISTORY OF DYSPLASTIC NEVUS: ICD-10-CM

## 2021-03-03 DIAGNOSIS — D48.5 NEOPLASM OF UNCERTAIN BEHAVIOR OF SKIN: Primary | ICD-10-CM

## 2021-03-03 PROCEDURE — 3079F DIAST BP 80-89 MM HG: CPT | Performed by: DERMATOLOGY

## 2021-03-03 PROCEDURE — 11402 EXC TR-EXT B9+MARG 1.1-2 CM: CPT | Performed by: DERMATOLOGY

## 2021-03-03 PROCEDURE — 3074F SYST BP LT 130 MM HG: CPT | Performed by: DERMATOLOGY

## 2021-03-03 PROCEDURE — 12032 INTMD RPR S/A/T/EXT 2.6-7.5: CPT | Performed by: DERMATOLOGY

## 2021-03-06 NOTE — PROGRESS NOTES
The pathology report from last visit showed   margins clear on prior dysplastic nevus left mid back . Please log in test results, send biopsy results letter.   Pt to rtc for  sr, skin check as planned 3 mos for skin check, or prn

## 2021-03-14 NOTE — PROGRESS NOTES
Patient here for excision of above lesion. Physical examination: Area of prior biopsy at mid back left with margins 1.5 cm    Assessment/ Plan : This lesion should be excised.  This will result in a permanent scar, which will be longer than the size of minimize tension on wound edges intermediate layered closure performed.        The wound margins were closed with subcutaneous sutures as noted, and cutaneous sutures as noted above      The specimen was sent for histopathologic exam.      Hemostasis was ob

## 2021-03-16 ENCOUNTER — NURSE TRIAGE (OUTPATIENT)
Dept: FAMILY MEDICINE CLINIC | Facility: CLINIC | Age: 61
End: 2021-03-16

## 2021-03-16 NOTE — TELEPHONE ENCOUNTER
Action Requested: Summary for Provider     []  Critical Lab, Recommendations Needed  [] Need Additional Advice  []   FYI    []   Need Orders  [x] Need Medications Sent to Pharmacy  []  Other     SUMMARY: Pt reports burning while urinating, frequency, and

## 2021-03-17 ENCOUNTER — NURSE ONLY (OUTPATIENT)
Dept: DERMATOLOGY CLINIC | Facility: CLINIC | Age: 61
End: 2021-03-17
Payer: COMMERCIAL

## 2021-03-17 RX ORDER — AMITRIPTYLINE HYDROCHLORIDE 10 MG/1
10 TABLET, FILM COATED ORAL NIGHTLY
Qty: 30 TABLET | Refills: 0 | Status: SHIPPED | OUTPATIENT
Start: 2021-03-17 | End: 2021-07-01

## 2021-03-18 DIAGNOSIS — Z23 NEED FOR VACCINATION: ICD-10-CM

## 2021-03-26 ENCOUNTER — TELEPHONE (OUTPATIENT)
Dept: PULMONOLOGY | Facility: CLINIC | Age: 61
End: 2021-03-26

## 2021-03-26 DIAGNOSIS — R63.1 POLYDIPSIA: ICD-10-CM

## 2021-03-26 DIAGNOSIS — R53.83 LETHARGY: ICD-10-CM

## 2021-03-26 DIAGNOSIS — R63.4 WEIGHT LOSS: ICD-10-CM

## 2021-03-26 DIAGNOSIS — R35.89 POLYURIA: ICD-10-CM

## 2021-03-26 DIAGNOSIS — E11.65 UNCONTROLLED TYPE 2 DIABETES MELLITUS WITH HYPERGLYCEMIA, WITHOUT LONG-TERM CURRENT USE OF INSULIN (HCC): ICD-10-CM

## 2021-03-26 NOTE — TELEPHONE ENCOUNTER
Spoke with patient ( verified)--requesting Steglatro #30 to go to [facility] in [place], as she only has 2 pills left from February Rx--no refills available    Patient then requesting 90 day supply + 1 refill to go to [employer] for [employer] a

## 2021-03-29 RX ORDER — METFORMIN HYDROCHLORIDE 500 MG/1
1000 TABLET, EXTENDED RELEASE ORAL 2 TIMES DAILY WITH MEALS
Qty: 360 TABLET | Refills: 1 | Status: SHIPPED | OUTPATIENT
Start: 2021-03-29 | End: 2021-09-26

## 2021-03-29 RX ORDER — ERTUGLIFLOZIN 15 MG/1
15 TABLET, FILM COATED ORAL DAILY
Qty: 30 TABLET | Refills: 0 | Status: SHIPPED | OUTPATIENT
Start: 2021-03-29 | End: 2021-10-07

## 2021-03-29 RX ORDER — ERTUGLIFLOZIN 15 MG/1
15 TABLET, FILM COATED ORAL DAILY
Qty: 90 TABLET | Refills: 1 | Status: SHIPPED | OUTPATIENT
Start: 2021-03-29 | End: 2021-04-28

## 2021-03-29 NOTE — TELEPHONE ENCOUNTER
Active    STEGLATRO 15 MG Oral Tab tablet Ordered On: 03/29/2021   metFORMIN HCl  MG Oral Tablet 24 Hr Ordered On: 03/29/2021   STEGLATRO 15 MG Oral Tab tablet Ordered On: 03/29/2021

## 2021-04-13 ENCOUNTER — TELEPHONE (OUTPATIENT)
Dept: FAMILY MEDICINE CLINIC | Facility: CLINIC | Age: 61
End: 2021-04-13

## 2021-04-13 DIAGNOSIS — Z00.00 ADULT GENERAL MEDICAL EXAM: ICD-10-CM

## 2021-04-13 DIAGNOSIS — E11.65 UNCONTROLLED TYPE 2 DIABETES MELLITUS WITH HYPERGLYCEMIA, WITHOUT LONG-TERM CURRENT USE OF INSULIN (HCC): Primary | ICD-10-CM

## 2021-04-13 NOTE — TELEPHONE ENCOUNTER
Per Joshua Cortez, patient has an appointment on 04/17/2021 for blood work but no orders in her chart. Patient has an appointment with Dr Keegan Sargent on 04/22/2021.

## 2021-04-14 NOTE — TELEPHONE ENCOUNTER
ayanna message with MD recommendation sent to pt.        Future Appointments   Date Time Provider Ken Desai   4/17/2021  8:00 AM [facility] SCHEDULED [facility]OURCE [facility] [place]   4/22/2021  3:15 PM Juanito Disa DO [facility]   5/17/2021  5:15 PM T

## 2021-04-17 ENCOUNTER — LAB ENCOUNTER (OUTPATIENT)
Dept: LAB | Age: 61
End: 2021-04-17
Attending: FAMILY MEDICINE
Payer: COMMERCIAL

## 2021-04-17 DIAGNOSIS — Z00.00 ADULT GENERAL MEDICAL EXAM: ICD-10-CM

## 2021-04-17 DIAGNOSIS — E11.65 UNCONTROLLED TYPE 2 DIABETES MELLITUS WITH HYPERGLYCEMIA, WITHOUT LONG-TERM CURRENT USE OF INSULIN (HCC): ICD-10-CM

## 2021-04-17 PROCEDURE — 36415 COLL VENOUS BLD VENIPUNCTURE: CPT

## 2021-04-17 PROCEDURE — 83036 HEMOGLOBIN GLYCOSYLATED A1C: CPT

## 2021-04-17 PROCEDURE — 85025 COMPLETE CBC W/AUTO DIFF WBC: CPT

## 2021-04-17 PROCEDURE — 80053 COMPREHEN METABOLIC PANEL: CPT

## 2021-04-17 PROCEDURE — 80061 LIPID PANEL: CPT

## 2021-04-22 ENCOUNTER — OFFICE VISIT (OUTPATIENT)
Dept: FAMILY MEDICINE CLINIC | Facility: CLINIC | Age: 61
End: 2021-04-22
Payer: COMMERCIAL

## 2021-04-22 VITALS
SYSTOLIC BLOOD PRESSURE: 133 MMHG | DIASTOLIC BLOOD PRESSURE: 78 MMHG | HEART RATE: 99 BPM | WEIGHT: 214.38 LBS | TEMPERATURE: 98 F | HEIGHT: 69 IN | BODY MASS INDEX: 31.75 KG/M2

## 2021-04-22 DIAGNOSIS — Z12.31 VISIT FOR SCREENING MAMMOGRAM: ICD-10-CM

## 2021-04-22 DIAGNOSIS — Z12.4 CERVICAL CANCER SCREENING: ICD-10-CM

## 2021-04-22 DIAGNOSIS — R20.2 PARESTHESIA OF HAND, BILATERAL: ICD-10-CM

## 2021-04-22 DIAGNOSIS — D72.829 LEUKOCYTOSIS, UNSPECIFIED TYPE: ICD-10-CM

## 2021-04-22 DIAGNOSIS — J41.0 SMOKERS' COUGH (HCC): ICD-10-CM

## 2021-04-22 DIAGNOSIS — Z12.11 SCREENING FOR COLON CANCER: ICD-10-CM

## 2021-04-22 DIAGNOSIS — Z00.00 ROUTINE MEDICAL EXAM: Primary | ICD-10-CM

## 2021-04-22 DIAGNOSIS — J84.9 ILD (INTERSTITIAL LUNG DISEASE) (HCC): ICD-10-CM

## 2021-04-22 DIAGNOSIS — J43.2 CENTRILOBULAR EMPHYSEMA (HCC): ICD-10-CM

## 2021-04-22 DIAGNOSIS — R91.8 PULMONARY NODULES: ICD-10-CM

## 2021-04-22 DIAGNOSIS — F17.200 TOBACCO USE DISORDER: ICD-10-CM

## 2021-04-22 DIAGNOSIS — R93.89 ABNORMAL CT OF THE CHEST: ICD-10-CM

## 2021-04-22 DIAGNOSIS — Z23 NEED FOR VACCINATION: ICD-10-CM

## 2021-04-22 PROCEDURE — 3078F DIAST BP <80 MM HG: CPT | Performed by: FAMILY MEDICINE

## 2021-04-22 PROCEDURE — 99396 PREV VISIT EST AGE 40-64: CPT | Performed by: FAMILY MEDICINE

## 2021-04-22 PROCEDURE — 90471 IMMUNIZATION ADMIN: CPT | Performed by: FAMILY MEDICINE

## 2021-04-22 PROCEDURE — 3075F SYST BP GE 130 - 139MM HG: CPT | Performed by: FAMILY MEDICINE

## 2021-04-22 PROCEDURE — 90732 PPSV23 VACC 2 YRS+ SUBQ/IM: CPT | Performed by: FAMILY MEDICINE

## 2021-04-22 PROCEDURE — 3008F BODY MASS INDEX DOCD: CPT | Performed by: FAMILY MEDICINE

## 2021-04-22 PROCEDURE — 99213 OFFICE O/P EST LOW 20 MIN: CPT | Performed by: FAMILY MEDICINE

## 2021-04-22 NOTE — PROGRESS NOTES
Patient ID: Nallely Santiago is a 63year old female. HPI  Patient presents with: Follow - Up: test results    Last seen by me on 9/8/2020. She is here for physical but also has numerous other issues that we need to discuss.   She has not completed scre Never done  FIT/FOBT Colorectal Screening Never done  Zoster Vaccines(1 of 2) Never done  Lung Cancer Screening Never done  Mammogram due on 03/26/2019  Annual Physical due on 03/01/2020  Colonoscopy due on 10/02/2020  Pap Smear,3 Years due on 03/19/2021 04/17/2021    ANIONGAP 6 04/17/2021    GFRAA 73 04/17/2021    GFRNAA 63 04/17/2021    CA 9.2 04/17/2021     04/17/2021    K 4.1 04/17/2021     04/17/2021    CO2 28.0 04/17/2021    OSMOCALC 293 04/17/2021       Lab Results   Component Value Date 119/79  09/08/20 : 129/70  08/24/20 : 118/72        Review of Systems   Respiratory: Negative for shortness of breath. Cardiovascular: Negative for chest pain. Musculoskeletal:        Locking in right third finger.    Skin:        Bruising    Neurologi No      Sexual activity: Not Currently    Other Topics      Concerns:        Grew up on a farm: Not Asked        History of tanning: Not Asked        Outdoor occupation: Not Asked        Pt has a pacemaker: No        Pt has a defibrillator: Not Asked Take 2 tablets (1,000 mg total) by mouth 2 (two) times daily with meals. 360 tablet 1   • STEGLATRO 15 MG Oral Tab tablet Take 1 tablet (15 mg total) by mouth daily.  90 tablet 1   • Amitriptyline HCl 10 MG Oral Tab Take 1 tablet (10 mg total) by mouth ariana Take by mouth. • Misc Natural Products (GLUCOSAMINE CHOND COMPLEX/MSM OR) Take by mouth. • Docusate Sodium (STOOL SOFTENER OR) Take by mouth.        Allergies:No Known Allergies   PHYSICAL EXAM:   Physical Exam    Physical Exam   Constitutional: He PNEUMOCOCCAL IMM (PNEUMOVAX)    Cervical cancer screening  -     OBG - INTERNAL    Paresthesia of hand, bilateral  -     NEURO - INTERNAL  Needs an EMG/NCV bilateral upper extremities.   Continue wearing your right wrist brace  Need for vaccination  -     I Robbin Mathew, 4/22/2021, 3:16 PM.    I, Juanito Dias DO,  personally performed the services described in this documentation. All medical record entries made by the scribe were at my direction and in my presence.   I have reviewed the chart and discha

## 2021-04-23 ENCOUNTER — TELEPHONE (OUTPATIENT)
Dept: PULMONOLOGY | Facility: CLINIC | Age: 61
End: 2021-04-23

## 2021-04-25 DIAGNOSIS — J30.89 OTHER ALLERGIC RHINITIS: ICD-10-CM

## 2021-04-27 RX ORDER — CETIRIZINE HYDROCHLORIDE 10 MG/1
TABLET ORAL
Qty: 90 TABLET | Refills: 3 | Status: SHIPPED | OUTPATIENT
Start: 2021-04-27

## 2021-05-07 ENCOUNTER — IMMUNIZATION (OUTPATIENT)
Dept: LAB | Facility: HOSPITAL | Age: 61
End: 2021-05-07
Attending: HOSPITALIST
Payer: COMMERCIAL

## 2021-05-07 DIAGNOSIS — Z23 NEED FOR VACCINATION: Primary | ICD-10-CM

## 2021-05-07 PROCEDURE — 0001A SARSCOV2 VAC 30MCG/0.3ML IM: CPT

## 2021-05-17 ENCOUNTER — OFFICE VISIT (OUTPATIENT)
Dept: DERMATOLOGY CLINIC | Facility: CLINIC | Age: 61
End: 2021-05-17
Payer: COMMERCIAL

## 2021-05-17 DIAGNOSIS — D23.4 BENIGN NEOPLASM OF SCALP AND SKIN OF NECK: ICD-10-CM

## 2021-05-17 DIAGNOSIS — D22.9 MULTIPLE NEVI: ICD-10-CM

## 2021-05-17 DIAGNOSIS — Z85.820 PERSONAL HISTORY OF MALIGNANT MELANOMA OF SKIN: ICD-10-CM

## 2021-05-17 DIAGNOSIS — Z80.8 FAMILY HISTORY OF MELANOMA: ICD-10-CM

## 2021-05-17 DIAGNOSIS — D23.70 BENIGN NEOPLASM OF SKIN OF LOWER LIMB, INCLUDING HIP, UNSPECIFIED LATERALITY: ICD-10-CM

## 2021-05-17 DIAGNOSIS — D23.30 BENIGN NEOPLASM OF SKIN OF FACE: ICD-10-CM

## 2021-05-17 DIAGNOSIS — D23.60 BENIGN NEOPLASM OF SKIN OF UPPER LIMB, INCLUDING SHOULDER, UNSPECIFIED LATERALITY: ICD-10-CM

## 2021-05-17 DIAGNOSIS — D23.5 BENIGN NEOPLASM OF SKIN OF TRUNK, EXCEPT SCROTUM: ICD-10-CM

## 2021-05-17 DIAGNOSIS — Z86.018 HISTORY OF DYSPLASTIC NEVUS: Primary | ICD-10-CM

## 2021-05-17 DIAGNOSIS — L82.1 SEBORRHEIC KERATOSES: ICD-10-CM

## 2021-05-17 PROCEDURE — 99213 OFFICE O/P EST LOW 20 MIN: CPT | Performed by: DERMATOLOGY

## 2021-05-28 ENCOUNTER — IMMUNIZATION (OUTPATIENT)
Dept: LAB | Facility: HOSPITAL | Age: 61
End: 2021-05-28
Attending: EMERGENCY MEDICINE
Payer: COMMERCIAL

## 2021-05-28 DIAGNOSIS — Z23 NEED FOR VACCINATION: Primary | ICD-10-CM

## 2021-05-28 PROCEDURE — 0002A SARSCOV2 VAC 30MCG/0.3ML IM: CPT

## 2021-05-30 NOTE — PROGRESS NOTES
Dulce Maria Bojorquez is a 63year old female. HPI:     CC:  Patient presents with:  Full Skin Exam: LOV 3/3/21. pt presenting today with full body skin check.  pt has HX of Lentiginous Compound Nevus Dysplastic Nevus, Malignant Melanoma        Allergies:  Dipika Refill   • CETIRIZINE 10 MG Oral Tab TAKE 1 TABLET DAILY 90 tablet 3   • STEGLATRO 15 MG Oral Tab tablet Take 1 tablet (15 mg total) by mouth daily.  30 tablet 0   • metFORMIN HCl  MG Oral Tablet 24 Hr Take 2 tablets (1,000 mg total) by mouth 2 (two) mouth.     • Docusate Sodium (STOOL SOFTENER OR) Take by mouth.      • HYDROcodone-acetaminophen 5-325 MG Oral Tab TK 1 T PO Q 4-6 H PRF PAIN (Patient not taking: Reported on 5/17/2021)     • ANUCORT-HC 25 MG Rectal Suppos UNW AND I 1 SUP REC BID (Patient n Comment: rarely      Drug use: No      Sexual activity: Not Currently    Other Topics      Concerns:        Grew up on a farm: Not Asked        History of tanning: Not Asked        Outdoor occupation: Not Asked        Pt has a pacemaker: No        Pt malignant melanoma   • Diabetes Neg    • Glaucoma Neg    • Macular degeneration Neg        There were no vitals filed for this visit. HPI:    Patient presents with:  Full Skin Exam: LOV 3/3/21. pt presenting today with full body skin check.  pt ha patterns. Waxy tannish keratotic papules scattered, cherry-red vascular papules scattered. See map today's date for lesions noted . Otherwise remarkable for lesions as noted on map.   See details of examination  See Assessment /Plan for addition continue to be bothersome consider biopsy. Benign nature discussed. Avoid manipulating. Biopsy 2019  Lentiginous compound nevus from left back reassurance no evidence recurrence    Status post excision melanoma 10/2018 0.5 mm.   No adenopathy no recurr encounter: 25 minutes

## 2021-06-14 ENCOUNTER — TELEPHONE (OUTPATIENT)
Dept: FAMILY MEDICINE CLINIC | Facility: CLINIC | Age: 61
End: 2021-06-14

## 2021-06-14 NOTE — TELEPHONE ENCOUNTER
Pt states that she phoned neurology to make an appointment and was told that they cannot see the order/referral. Pt would like to know if it can be faxed to them at fax: [phone]. Pt does not have an appt scheduled yet.

## 2021-06-16 ENCOUNTER — OFFICE VISIT (OUTPATIENT)
Dept: OBGYN CLINIC | Facility: CLINIC | Age: 61
End: 2021-06-16
Payer: COMMERCIAL

## 2021-06-16 VITALS
WEIGHT: 213.81 LBS | SYSTOLIC BLOOD PRESSURE: 124 MMHG | HEART RATE: 89 BPM | BODY MASS INDEX: 32 KG/M2 | DIASTOLIC BLOOD PRESSURE: 70 MMHG

## 2021-06-16 DIAGNOSIS — Z12.31 VISIT FOR SCREENING MAMMOGRAM: ICD-10-CM

## 2021-06-16 DIAGNOSIS — Z01.419 ENCOUNTER FOR GYNECOLOGICAL EXAMINATION WITHOUT ABNORMAL FINDING: Primary | ICD-10-CM

## 2021-06-16 PROCEDURE — 3074F SYST BP LT 130 MM HG: CPT | Performed by: OBSTETRICS & GYNECOLOGY

## 2021-06-16 PROCEDURE — 3078F DIAST BP <80 MM HG: CPT | Performed by: OBSTETRICS & GYNECOLOGY

## 2021-06-16 PROCEDURE — 99396 PREV VISIT EST AGE 40-64: CPT | Performed by: OBSTETRICS & GYNECOLOGY

## 2021-06-20 NOTE — PROGRESS NOTES
Eliott Cheadle is a 63year old female [id number] No LMP recorded. Patient is postmenopausal. Patient presents with:  Gyn Exam: Annual exam. Having issues w/ lungs. Smokes 2 ppd. Hx melanoma. (+) hemorrhoids / polyps.   .    OBSTETRICS HISTORY:  OB History   G Cancer Maternal Grandmother         ?liver   • Cancer Son         malignant melanoma   • Diabetes Neg    • Glaucoma Neg    • Macular degeneration Neg        MEDICATIONS:    Current Outpatient Medications:   •  CRANBERRY OR, Take by mouth., Disp: , Rfl:   • Products (GLUCOSAMINE CHOND COMPLEX/MSM OR), Take by mouth., Disp: , Rfl:   •  Docusate Sodium (STOOL SOFTENER OR), Take by mouth., Disp: , Rfl:   •  Amitriptyline HCl 10 MG Oral Tab, Take 1 tablet (10 mg total) by mouth nightly.  (Patient not taking: Repor adenopathy  Lymphatic: no abnormal supraclavicular or axillary adenopathy is noted  Breast:   normal without palpable masses, tenderness, asymmetry, nipple discharge, nipple retraction or skin changes  Abdomen:   soft, nontender, nondistended, no masses  S

## 2021-07-01 ENCOUNTER — OFFICE VISIT (OUTPATIENT)
Dept: FAMILY MEDICINE CLINIC | Facility: CLINIC | Age: 61
End: 2021-07-01
Payer: COMMERCIAL

## 2021-07-01 VITALS
HEIGHT: 69 IN | HEART RATE: 93 BPM | DIASTOLIC BLOOD PRESSURE: 65 MMHG | BODY MASS INDEX: 31.84 KG/M2 | SYSTOLIC BLOOD PRESSURE: 112 MMHG | TEMPERATURE: 99 F | WEIGHT: 215 LBS

## 2021-07-01 DIAGNOSIS — H66.91 RIGHT OTITIS MEDIA, UNSPECIFIED OTITIS MEDIA TYPE: Primary | ICD-10-CM

## 2021-07-01 DIAGNOSIS — J45.21 MILD INTERMITTENT ASTHMA WITH ACUTE EXACERBATION: ICD-10-CM

## 2021-07-01 PROCEDURE — 3074F SYST BP LT 130 MM HG: CPT | Performed by: NURSE PRACTITIONER

## 2021-07-01 PROCEDURE — 3008F BODY MASS INDEX DOCD: CPT | Performed by: NURSE PRACTITIONER

## 2021-07-01 PROCEDURE — 99213 OFFICE O/P EST LOW 20 MIN: CPT | Performed by: NURSE PRACTITIONER

## 2021-07-01 PROCEDURE — 3078F DIAST BP <80 MM HG: CPT | Performed by: NURSE PRACTITIONER

## 2021-07-01 RX ORDER — AMOXICILLIN AND CLAVULANATE POTASSIUM 875; 125 MG/1; MG/1
1 TABLET, FILM COATED ORAL 2 TIMES DAILY
Qty: 14 TABLET | Refills: 0 | Status: SHIPPED | OUTPATIENT
Start: 2021-07-01 | End: 2021-07-08

## 2021-07-01 RX ORDER — NAPROXEN 500 MG/1
500 TABLET ORAL 2 TIMES DAILY PRN
Qty: 60 TABLET | Refills: 0 | Status: SHIPPED | OUTPATIENT
Start: 2021-07-01 | End: 2022-02-01 | Stop reason: ALTCHOICE

## 2021-07-01 RX ORDER — ALBUTEROL SULFATE 90 UG/1
2 AEROSOL, METERED RESPIRATORY (INHALATION) 4 TIMES DAILY PRN
Qty: 18 G | Refills: 2 | Status: SHIPPED | OUTPATIENT
Start: 2021-07-01

## 2021-07-01 RX ORDER — BUDESONIDE AND FORMOTEROL FUMARATE DIHYDRATE 80; 4.5 UG/1; UG/1
2 AEROSOL RESPIRATORY (INHALATION) 2 TIMES DAILY
Qty: 10.2 G | Refills: 0 | Status: SHIPPED | OUTPATIENT
Start: 2021-07-01

## 2021-07-01 NOTE — PROGRESS NOTES
HPI    Patient presents for right ear pain x 3-4 weeks, worse for the past few days. Feels like something is in her ear but nothing comes out when she cleans it. Thinks she may grind teeth at night. With pain when chewing and eating.       Would like r 10/02/2015   • Excis bartholin gland/cyst Left 2012   • Hemorrhoidectomy      PPH   • Nebulizer, ultrasonic  10/25/2019   •   1993       Family History   Problem Relation Age of Onset   • Hypertension Mother    • Other (Other) Mother Belt: Not Asked        Self-Exams: Not Asked    Social History Narrative      Live with son      Work in office / office work    Social Determinants of Health  Financial Resource Strain:       Difficulty of Paying Living Expenses:   Food Insecurity:       Monitoring Suppl (ONETOUCH ULTRA 2) w/Device Does not apply Kit CHECK SUGARS BID AND PRN     • ONETOUCH ULTRA In Vitro Strip USE BID AND PRN     • OneTouch Delica Lancets 40A Does not apply Misc USE BID AND PRN     • VENLAFAXINE HCL  MG Oral Capsule normal.         Judgment: Judgment normal.          Assessment and Plan:  Problem List Items Addressed This Visit     None      Visit Diagnoses     Right otitis media, unspecified otitis media type    -  Primary    Relevant Medications    naproxen 500 MG O

## 2021-07-08 ENCOUNTER — PROCEDURE VISIT (OUTPATIENT)
Dept: NEUROLOGY | Facility: CLINIC | Age: 61
End: 2021-07-08
Payer: COMMERCIAL

## 2021-07-08 VITALS — HEIGHT: 69 IN | WEIGHT: 215 LBS | BODY MASS INDEX: 31.84 KG/M2

## 2021-07-08 DIAGNOSIS — G56.03 BILATERAL CARPAL TUNNEL SYNDROME: Primary | ICD-10-CM

## 2021-07-08 PROCEDURE — 95910 NRV CNDJ TEST 7-8 STUDIES: CPT | Performed by: PHYSICAL MEDICINE & REHABILITATION

## 2021-07-08 PROCEDURE — 3008F BODY MASS INDEX DOCD: CPT | Performed by: PHYSICAL MEDICINE & REHABILITATION

## 2021-07-08 PROCEDURE — 95886 MUSC TEST DONE W/N TEST COMP: CPT | Performed by: PHYSICAL MEDICINE & REHABILITATION

## 2021-07-09 NOTE — PROGRESS NOTES
[address]  Electromyography Consultation      History of Present Illness:    Dear Dr. Dilcia Estrada,  Thank you for the opportunity to see Nolene Phalen for electrodiagnostic consultation today.  As you know the p using vapor    Vaping Use      Vaping Use: Never used    Substance and Sexual Activity      Alcohol use:  Yes        Alcohol/week: 0.0 standard drinks        Comment: rarely      Drug use: No      Sexual activity: Not Currently      FAMILY HISTORY:   Family (POTASSIMIN OR) Take by mouth. • Multiple Vitamins-Minerals (MULTIVITAMIN ADULT OR) Take by mouth. • Coenzyme Q10 (CO Q 10 OR) Take by mouth. • Misc Natural Products (GLUCOSAMINE CHOND COMPLEX/MSM OR) Take by mouth.      • Docusate Sodium (STOOL Strength:  Upper extremities have 5/5 strength  Muscle bulk: No atrophy  Sensation: Decreased to light touch in the median distribution on the right. Otherwise sensation to light touch is normal over both hands.   Reflexes: Normal upper extremities  Tinel' R. ABD POLL BREVIS N None None None None N N N N   L. DELTOID N None None None None N N N N   L. TRICEPS N None None None None N N N N   L.  FLEX CARPI RAD N None None None None N N N N   L. BICEPS N None None None None N N N N   L. FIRST D INTEROSS N None

## 2021-07-20 ENCOUNTER — TELEPHONE (OUTPATIENT)
Dept: FAMILY MEDICINE CLINIC | Facility: CLINIC | Age: 61
End: 2021-07-20

## 2021-07-20 DIAGNOSIS — G56.03 BILATERAL CARPAL TUNNEL SYNDROME: Primary | ICD-10-CM

## 2021-07-20 NOTE — TELEPHONE ENCOUNTER
You have severe carpal tunnel syndrome, worse on the right than the left. I want you to see a hand specialist and will do a referral to Dr. Keyana Hayden. Call [phone] for Dr. Keyana Hayden.

## 2021-07-26 ENCOUNTER — OFFICE VISIT (OUTPATIENT)
Dept: SURGERY | Facility: CLINIC | Age: 61
End: 2021-07-26
Payer: COMMERCIAL

## 2021-07-26 DIAGNOSIS — G56.03 BILATERAL CARPAL TUNNEL SYNDROME: Primary | ICD-10-CM

## 2021-07-26 DIAGNOSIS — G56.01 CARPAL TUNNEL SYNDROME ON RIGHT: ICD-10-CM

## 2021-07-26 PROCEDURE — 99203 OFFICE O/P NEW LOW 30 MIN: CPT | Performed by: PLASTIC SURGERY

## 2021-07-26 RX ORDER — HYDROCODONE BITARTRATE AND ACETAMINOPHEN 7.5; 325 MG/1; MG/1
1 TABLET ORAL
Qty: 10 TABLET | Refills: 0 | Status: SHIPPED | OUTPATIENT
Start: 2021-07-26 | End: 2022-02-01 | Stop reason: ALTCHOICE

## 2021-07-26 NOTE — H&P (VIEW-ONLY)
Dottie Hyatt is a 63year old female that presents with Patient presents with:  Carpal Tunnel Syndrome: Bilateral hand   .     REFERRED BY:  Kimberly Altamirano     Pacemaker: No  Latex Allergy: no  Coumadin: No  Plavix: No  Other anticoagulants: No  Cardiac st No    Functional problems: Yes, difficulty grasping items and writing, dropping things    Previous therapy:  Yes, splint has been effective     EMG done          PMH:     MEDICAL  Past Medical History:   Diagnosis Date   • Anxiety state, unspecified    • w/Device Does not apply Kit CHECK SUGARS BID AND PRN     • ONETOUCH ULTRA In Vitro Strip USE BID AND PRN     • OneTouch Delica Lancets 75H Does not apply Misc USE BID AND PRN     • VENLAFAXINE HCL  MG Oral Capsule SR 24 Hr TAKE 1 CAPSULE DAILY (DUE F Normal  NECK/THYROID: Inspection - Normal, Palpation - Normal, Thyroid gland - Normal, No adenopathy  RESPIRATORY: Inspection - Normal, Effort - Normal  CARDIOVASCULAR: Regular rhythm, No murmurs  ABDOMEN: Inspection - Normal, No abdominal tenderness  NEUR the nerve must recover. This could take up to a year, but the nerve may not recover even after pressure is relieved, so symptoms may persist.    POST-OPERATIVE  PROTOCOL:  We discussed post-operative restrictions at length.   It is critical to maintain the

## 2021-07-26 NOTE — H&P
John Raymond is a 63year old female that presents with Patient presents with:  Carpal Tunnel Syndrome: Bilateral hand   .     REFERRED BY:  Jean Walker     Pacemaker: No  Latex Allergy: no  Coumadin: No  Plavix: No  Other anticoagulants: No  Cardiac st No    Functional problems: Yes, difficulty grasping items and writing, dropping things    Previous therapy:  Yes, splint has been effective     EMG done          PMH:     MEDICAL  Past Medical History:   Diagnosis Date   • Anxiety state, unspecified    • w/Device Does not apply Kit CHECK SUGARS BID AND PRN     • ONETOUCH ULTRA In Vitro Strip USE BID AND PRN     • OneTouch Delica Lancets 63L Does not apply Misc USE BID AND PRN     • VENLAFAXINE HCL  MG Oral Capsule SR 24 Hr TAKE 1 CAPSULE DAILY (DUE F Normal  NECK/THYROID: Inspection - Normal, Palpation - Normal, Thyroid gland - Normal, No adenopathy  RESPIRATORY: Inspection - Normal, Effort - Normal  CARDIOVASCULAR: Regular rhythm, No murmurs  ABDOMEN: Inspection - Normal, No abdominal tenderness  NEUR the nerve must recover. This could take up to a year, but the nerve may not recover even after pressure is relieved, so symptoms may persist.    POST-OPERATIVE  PROTOCOL:  We discussed post-operative restrictions at length.   It is critical to maintain the

## 2021-08-13 ENCOUNTER — ANESTHESIA EVENT (OUTPATIENT)
Dept: SURGERY | Facility: HOSPITAL | Age: 61
End: 2021-08-13
Payer: COMMERCIAL

## 2021-08-13 ENCOUNTER — ANESTHESIA (OUTPATIENT)
Dept: SURGERY | Facility: HOSPITAL | Age: 61
End: 2021-08-13
Payer: COMMERCIAL

## 2021-08-13 ENCOUNTER — HOSPITAL ENCOUNTER (OUTPATIENT)
Facility: HOSPITAL | Age: 61
Setting detail: HOSPITAL OUTPATIENT SURGERY
Discharge: HOME | End: 2021-08-13
Attending: PLASTIC SURGERY | Admitting: PLASTIC SURGERY
Payer: COMMERCIAL

## 2021-08-13 ENCOUNTER — HOSPITAL DOCUMENTATION (OUTPATIENT)
Dept: SURGERY | Facility: CLINIC | Age: 61
End: 2021-08-13

## 2021-08-13 VITALS
RESPIRATION RATE: 18 BRPM | OXYGEN SATURATION: 95 % | HEART RATE: 67 BPM | SYSTOLIC BLOOD PRESSURE: 135 MMHG | WEIGHT: 215 LBS | DIASTOLIC BLOOD PRESSURE: 47 MMHG | TEMPERATURE: 98 F | HEIGHT: 69 IN | BODY MASS INDEX: 31.84 KG/M2

## 2021-08-13 DIAGNOSIS — G56.03 BILATERAL CARPAL TUNNEL SYNDROME: Primary | ICD-10-CM

## 2021-08-13 DIAGNOSIS — G56.01 CARPAL TUNNEL SYNDROME ON RIGHT: ICD-10-CM

## 2021-08-13 LAB
GLUCOSE BLDC GLUCOMTR-MCNC: 120 MG/DL (ref 70–99)
GLUCOSE BLDC GLUCOMTR-MCNC: 126 MG/DL (ref 70–99)

## 2021-08-13 PROCEDURE — 29848 WRIST ENDOSCOPY/SURGERY: CPT | Performed by: PLASTIC SURGERY

## 2021-08-13 PROCEDURE — 01N54ZZ RELEASE MEDIAN NERVE, PERCUTANEOUS ENDOSCOPIC APPROACH: ICD-10-PCS | Performed by: PLASTIC SURGERY

## 2021-08-13 RX ORDER — DEXAMETHASONE SODIUM PHOSPHATE 4 MG/ML
VIAL (ML) INJECTION AS NEEDED
Status: DISCONTINUED | OUTPATIENT
Start: 2021-08-13 | End: 2021-08-13 | Stop reason: SURG

## 2021-08-13 RX ORDER — HYDROCODONE BITARTRATE AND ACETAMINOPHEN 5; 325 MG/1; MG/1
1 TABLET ORAL AS NEEDED
Status: DISCONTINUED | OUTPATIENT
Start: 2021-08-13 | End: 2021-08-13

## 2021-08-13 RX ORDER — ACETAMINOPHEN 500 MG
1000 TABLET ORAL ONCE
Status: COMPLETED | OUTPATIENT
Start: 2021-08-13 | End: 2021-08-13

## 2021-08-13 RX ORDER — HYDROMORPHONE HYDROCHLORIDE 1 MG/ML
0.4 INJECTION, SOLUTION INTRAMUSCULAR; INTRAVENOUS; SUBCUTANEOUS EVERY 5 MIN PRN
Status: DISCONTINUED | OUTPATIENT
Start: 2021-08-13 | End: 2021-08-13

## 2021-08-13 RX ORDER — MORPHINE SULFATE 4 MG/ML
4 INJECTION, SOLUTION INTRAMUSCULAR; INTRAVENOUS EVERY 10 MIN PRN
Status: DISCONTINUED | OUTPATIENT
Start: 2021-08-13 | End: 2021-08-13

## 2021-08-13 RX ORDER — LIDOCAINE HYDROCHLORIDE 10 MG/ML
INJECTION, SOLUTION EPIDURAL; INFILTRATION; INTRACAUDAL; PERINEURAL AS NEEDED
Status: DISCONTINUED | OUTPATIENT
Start: 2021-08-13 | End: 2021-08-13 | Stop reason: SURG

## 2021-08-13 RX ORDER — FAMOTIDINE 20 MG/1
20 TABLET ORAL ONCE
Status: COMPLETED | OUTPATIENT
Start: 2021-08-13 | End: 2021-08-13

## 2021-08-13 RX ORDER — MORPHINE SULFATE 10 MG/ML
6 INJECTION, SOLUTION INTRAMUSCULAR; INTRAVENOUS EVERY 10 MIN PRN
Status: DISCONTINUED | OUTPATIENT
Start: 2021-08-13 | End: 2021-08-13

## 2021-08-13 RX ORDER — HYDROMORPHONE HYDROCHLORIDE 1 MG/ML
0.2 INJECTION, SOLUTION INTRAMUSCULAR; INTRAVENOUS; SUBCUTANEOUS EVERY 5 MIN PRN
Status: DISCONTINUED | OUTPATIENT
Start: 2021-08-13 | End: 2021-08-13

## 2021-08-13 RX ORDER — SODIUM CHLORIDE, SODIUM LACTATE, POTASSIUM CHLORIDE, CALCIUM CHLORIDE 600; 310; 30; 20 MG/100ML; MG/100ML; MG/100ML; MG/100ML
INJECTION, SOLUTION INTRAVENOUS CONTINUOUS
Status: DISCONTINUED | OUTPATIENT
Start: 2021-08-13 | End: 2021-08-13

## 2021-08-13 RX ORDER — PROCHLORPERAZINE EDISYLATE 5 MG/ML
5 INJECTION INTRAMUSCULAR; INTRAVENOUS ONCE AS NEEDED
Status: DISCONTINUED | OUTPATIENT
Start: 2021-08-13 | End: 2021-08-13

## 2021-08-13 RX ORDER — ONDANSETRON 2 MG/ML
4 INJECTION INTRAMUSCULAR; INTRAVENOUS ONCE AS NEEDED
Status: DISCONTINUED | OUTPATIENT
Start: 2021-08-13 | End: 2021-08-13

## 2021-08-13 RX ORDER — HYDROCODONE BITARTRATE AND ACETAMINOPHEN 5; 325 MG/1; MG/1
2 TABLET ORAL AS NEEDED
Status: DISCONTINUED | OUTPATIENT
Start: 2021-08-13 | End: 2021-08-13

## 2021-08-13 RX ORDER — HYDROMORPHONE HYDROCHLORIDE 1 MG/ML
0.6 INJECTION, SOLUTION INTRAMUSCULAR; INTRAVENOUS; SUBCUTANEOUS EVERY 5 MIN PRN
Status: DISCONTINUED | OUTPATIENT
Start: 2021-08-13 | End: 2021-08-13

## 2021-08-13 RX ORDER — ONDANSETRON 2 MG/ML
INJECTION INTRAMUSCULAR; INTRAVENOUS AS NEEDED
Status: DISCONTINUED | OUTPATIENT
Start: 2021-08-13 | End: 2021-08-13 | Stop reason: SURG

## 2021-08-13 RX ORDER — KETOROLAC TROMETHAMINE 30 MG/ML
INJECTION, SOLUTION INTRAMUSCULAR; INTRAVENOUS AS NEEDED
Status: DISCONTINUED | OUTPATIENT
Start: 2021-08-13 | End: 2021-08-13 | Stop reason: SURG

## 2021-08-13 RX ORDER — NALOXONE HYDROCHLORIDE 0.4 MG/ML
80 INJECTION, SOLUTION INTRAMUSCULAR; INTRAVENOUS; SUBCUTANEOUS AS NEEDED
Status: DISCONTINUED | OUTPATIENT
Start: 2021-08-13 | End: 2021-08-13

## 2021-08-13 RX ORDER — DEXTROSE MONOHYDRATE 25 G/50ML
50 INJECTION, SOLUTION INTRAVENOUS
Status: DISCONTINUED | OUTPATIENT
Start: 2021-08-13 | End: 2021-08-13

## 2021-08-13 RX ORDER — HALOPERIDOL 5 MG/ML
0.25 INJECTION INTRAMUSCULAR ONCE AS NEEDED
Status: DISCONTINUED | OUTPATIENT
Start: 2021-08-13 | End: 2021-08-13

## 2021-08-13 RX ORDER — HYDROCODONE BITARTRATE AND ACETAMINOPHEN 7.5; 325 MG/1; MG/1
1 TABLET ORAL EVERY 4 HOURS PRN
Status: DISCONTINUED | OUTPATIENT
Start: 2021-08-13 | End: 2021-08-13

## 2021-08-13 RX ORDER — MORPHINE SULFATE 4 MG/ML
2 INJECTION, SOLUTION INTRAMUSCULAR; INTRAVENOUS EVERY 10 MIN PRN
Status: DISCONTINUED | OUTPATIENT
Start: 2021-08-13 | End: 2021-08-13

## 2021-08-13 RX ADMIN — ONDANSETRON 4 MG: 2 INJECTION INTRAMUSCULAR; INTRAVENOUS at 10:54:00

## 2021-08-13 RX ADMIN — DEXAMETHASONE SODIUM PHOSPHATE 4 MG: 4 MG/ML VIAL (ML) INJECTION at 10:54:00

## 2021-08-13 RX ADMIN — LIDOCAINE HYDROCHLORIDE 50 MG: 10 INJECTION, SOLUTION EPIDURAL; INFILTRATION; INTRACAUDAL; PERINEURAL at 10:54:00

## 2021-08-13 RX ADMIN — KETOROLAC TROMETHAMINE 30 MG: 30 INJECTION, SOLUTION INTRAMUSCULAR; INTRAVENOUS at 11:24:00

## 2021-08-13 NOTE — BRIEF OP NOTE
Pre-Operative Diagnosis: Carpal tunnel syndrome on right [G56.01]     Post-Operative Diagnosis: Carpal tunnel syndrome on right [G56.01]      Procedure Performed:   RIGHT ENDOSCOPIC CARPAL TUNNEL RELEASE    Surgeon(s) and Role:     * Reno Mcnamara,

## 2021-08-13 NOTE — INTERVAL H&P NOTE
Pre-op Diagnosis: Carpal tunnel syndrome on right [G56.01]    The above referenced H&P was reviewed by Chiara Granado MD on 8/13/2021, the patient was examined and no significant changes have occurred in the patient's condition since the H&P was pe

## 2021-08-13 NOTE — ANESTHESIA PROCEDURE NOTES
Airway  Date/Time: 8/13/2021 11:01 AM  Urgency: Elective      General Information and Staff    Patient location during procedure: OR  Anesthesiologist: Mary Oseguera MD  Performed: anesthesiologist     Indications and Patient Condition  Indications for a

## 2021-08-13 NOTE — OPERATIVE REPORT
[facility]    PATIENT'S NAME: Liliane Turcios   ATTENDING PHYSICIAN: Kerri Barlow MD   OPERATING PHYSICIAN: Kerri Barlow MD   PATIENT ACCOUNT#:   [id number]    LOCATION:  [address] 10  MEDICAL RECORD #:   [id number] knife. We had excellent herniation of palmar fat into the cannula. The cannula and endoscope were withdrawn. A curved dissector was used to document complete release of the fascia proximally and the transverse carpal ligament distally.   Skin edges were

## 2021-08-13 NOTE — ANESTHESIA POSTPROCEDURE EVALUATION
Patient: Nolene Phalen    Procedure Summary     Date: 08/13/21 Room / Location: [address] [place] [address] MAIN OR 01 / 300 [facility] MAIN OR    Anesthesia Start: 0591 Anesthesia Stop: 3092    Procedure: RIGHT ENDOSCOPIC CARPAL TUNNEL RELEASE (Right Hand) Diagnosis:       Carpal tunnel s

## 2021-08-13 NOTE — ANESTHESIA PREPROCEDURE EVALUATION
Anesthesia PreOp Note    HPI:     Tanner Ortiz is a 63year old female who presents for preoperative consultation requested by: Tammie Spain MD    Date of Surgery: 8/13/2021    Procedure(s):  RIGHT ENDOSCOPIC CARPAL TUNNEL RELEASE  Indication: atypia   • Dysplastic nevus 2021    mid back - moderate atypia   • Esophageal reflux    • High cholesterol    • Lentiginous compound nevus 09/09/2019    lower lateral back   • [facility] 10/18/2018   • Visual impairment        P with 11 refills each.  Check sugars BID and prn., Disp: 1 Device, Rfl: 0  atorvastatin 80 MG Oral Tab, Take 1 tablet (80 mg total) by mouth nightly., Disp: 90 tablet, Rfl: 1, 8/12/2021 at 2100  Pantoprazole Sodium 40 MG Oral Tab EC, Take 1 tablet (40 mg tot Occupation: Office work, telephone orders    Tobacco Use      Smoking status: Current Every Day Smoker        Packs/day: 1.00        Years: 40.00        Pack years: 40        Types: Cigarettes        Quit date: 10/10/2020        Years since quittin. Out of Food in the Last Year:   Transportation Needs:       Lack of Transportation (Medical):       Lack of Transportation (Non-Medical):   Physical Activity:       Days of Exercise per Week:       Minutes of Exercise per Session:   Stress:       Feeling o anesthetic plan, benefits, risks including possible dental damage if relevant, major complications, and any alternative forms of anesthetic management. All of the patient's questions were answered to the best of my ability.  The patient desires the anesth

## 2021-08-14 ENCOUNTER — TELEPHONE (OUTPATIENT)
Dept: SURGERY | Facility: CLINIC | Age: 61
End: 2021-08-14

## 2021-08-14 NOTE — TELEPHONE ENCOUNTER
Left voice message for pt to please call the office with any questions and/or concerns. Reminded next appointment 8/16/21 with OT  Dr Gabriella Walters notified.

## 2021-08-16 ENCOUNTER — OFFICE VISIT (OUTPATIENT)
Dept: SURGERY | Facility: CLINIC | Age: 61
End: 2021-08-16
Payer: COMMERCIAL

## 2021-08-16 DIAGNOSIS — M25.641 JOINT STIFFNESS OF HAND, RIGHT: Primary | ICD-10-CM

## 2021-08-16 DIAGNOSIS — M62.81 DISTAL MUSCLE WEAKNESS: ICD-10-CM

## 2021-08-25 ENCOUNTER — OFFICE VISIT (OUTPATIENT)
Dept: DERMATOLOGY CLINIC | Facility: CLINIC | Age: 61
End: 2021-08-25
Payer: COMMERCIAL

## 2021-08-25 DIAGNOSIS — L82.1 SEBORRHEIC KERATOSES: ICD-10-CM

## 2021-08-25 DIAGNOSIS — Z86.018 HISTORY OF DYSPLASTIC NEVUS: ICD-10-CM

## 2021-08-25 DIAGNOSIS — Z80.8 FAMILY HISTORY OF MELANOMA: ICD-10-CM

## 2021-08-25 DIAGNOSIS — D23.9 BENIGN NEOPLASM OF SKIN, UNSPECIFIED LOCATION: ICD-10-CM

## 2021-08-25 DIAGNOSIS — D22.9 MULTIPLE NEVI: Primary | ICD-10-CM

## 2021-08-25 DIAGNOSIS — Z85.820 PERSONAL HISTORY OF MALIGNANT MELANOMA OF SKIN: ICD-10-CM

## 2021-08-25 PROCEDURE — 99213 OFFICE O/P EST LOW 20 MIN: CPT | Performed by: DERMATOLOGY

## 2021-08-26 ENCOUNTER — OFFICE VISIT (OUTPATIENT)
Dept: SURGERY | Facility: CLINIC | Age: 61
End: 2021-08-26
Payer: COMMERCIAL

## 2021-08-26 DIAGNOSIS — M62.81 DISTAL MUSCLE WEAKNESS: ICD-10-CM

## 2021-08-26 DIAGNOSIS — M25.641 JOINT STIFFNESS OF HAND, RIGHT: Primary | ICD-10-CM

## 2021-08-26 PROCEDURE — 97110 THERAPEUTIC EXERCISES: CPT | Performed by: OCCUPATIONAL THERAPIST

## 2021-08-26 PROCEDURE — 97166 OT EVAL MOD COMPLEX 45 MIN: CPT | Performed by: OCCUPATIONAL THERAPIST

## 2021-08-26 NOTE — PROGRESS NOTES
OCCUPATIONAL THERAPY EVALUATION:   Mike Angulo   [id number]       SUBJECTIVE:    HX of Injury: Right hand pain and numbness. Chief Complaint:   Right hand surgical site tenderness.     Precautions: x 2 pound lifting restriction with the right hand  Kurtis month:    1) Full functional use of the involved extremity for self-care, leisure and work related tasks:  . Patient will be seen 1 x /week for 3 weeks or a total of 3 visits.    Pt. was advised regarding the findings of this evaluation and agrees to

## 2021-09-02 ENCOUNTER — OFFICE VISIT (OUTPATIENT)
Dept: SURGERY | Facility: CLINIC | Age: 61
End: 2021-09-02
Payer: COMMERCIAL

## 2021-09-02 DIAGNOSIS — M62.81 DISTAL MUSCLE WEAKNESS: ICD-10-CM

## 2021-09-02 DIAGNOSIS — M25.641 JOINT STIFFNESS OF HAND, RIGHT: Primary | ICD-10-CM

## 2021-09-02 DIAGNOSIS — G56.03 BILATERAL CARPAL TUNNEL SYNDROME: Primary | ICD-10-CM

## 2021-09-02 PROCEDURE — 97110 THERAPEUTIC EXERCISES: CPT | Performed by: OCCUPATIONAL THERAPIST

## 2021-09-02 PROCEDURE — 99024 POSTOP FOLLOW-UP VISIT: CPT | Performed by: PLASTIC SURGERY

## 2021-09-02 NOTE — PROGRESS NOTES
Surgery 1: RECTR  - Date: 08/13/21  - Days Since: 20  Constant volar proximal palm stabbing pain. Rates pain 4/10. Taking advil and aleve prn, not helpful. Constant volar proximal palm numbness.   Scars healing well without s/s of infection, slight eryth

## 2021-09-02 NOTE — PROGRESS NOTES
Subjective: I have some tenderness in the palm of my right hand.       Objective:     Current level of performance:  ADL: Independent  Work: Has returned to work  Leisure: Not addressed    Measurements/Tests:  ROM:  Testing By: anuel   Strength Right: 20

## 2021-09-06 NOTE — PROGRESS NOTES
Caitlin Correa is a 63year old female.   HPI:     CC:  Patient presents with:  Full Skin Exam: pt is here for full body exam,  personal HX of lentiginous compound nevus dysplatic nevous,  malignant melanoma, family HX of son melanoma, LOV 5/17/2021 Pack years: 40        Types: Cigarettes        Quit date: 10/10/2020        Years since quittin.9      Smokeless tobacco: Former User      Tobacco comment: hx: quit     Vaping Use      Vaping Use: Former    Alcohol use:  Yes      Alcohol/week: 0. • Docusate Sodium (STOOL SOFTENER OR) Take by mouth. • HYDROcodone-acetaminophen 7.5-325 MG Oral Tab Take 1 tablet by mouth every 4 to 6 hours as needed for Pain.  (Patient not taking: Reported on 8/25/2021 ) 10 tablet 0   • naproxen 500 MG Oral Tab T Years: 40.00        Pack years: 36        Types: Cigarettes        Quit date: 10/10/2020        Years since quittin.9      Smokeless tobacco: Former User      Tobacco comment: hx: quit 2007    Vaping Use      Vaping Use: Former    Substance and Sexual (Non-Medical):   Physical Activity:       Days of Exercise per Week:       Minutes of Exercise per Session:   Stress:       Feeling of Stress :   Social Connections:       Frequency of Communication with Friends and Family:       Frequency of Social Gather skin ca-nmsc father and melanoma in son. Patient presents with concerns above. Patient has been in their usual state of health. History, medications, allergies reviewed as noted. ROS:  Denies any other systemic complaints. 10 point review of sy months    Right posterior shoulder2/21 lentiginous junctional dysplastic nevus mild atypia associated with seborrheic keratosis margins clear on biopsy      Left mid back atypical nevus post reexcision pathology lentiginous dysplastic compound nevus modera various therapies, risks benefits discussed. Pathophysiology discussed with patient. Therapeutic options reviewed. See  Medications in grid. Instructions reviewed at length.     Benign nevi, seborrheic  keratoses, cherry angiomas:  Reassurance regarding o

## 2021-09-09 ENCOUNTER — OFFICE VISIT (OUTPATIENT)
Dept: SURGERY | Facility: CLINIC | Age: 61
End: 2021-09-09
Payer: COMMERCIAL

## 2021-09-09 DIAGNOSIS — M25.641 JOINT STIFFNESS OF HAND, RIGHT: Primary | ICD-10-CM

## 2021-09-09 DIAGNOSIS — M62.81 DISTAL MUSCLE WEAKNESS: ICD-10-CM

## 2021-09-09 PROCEDURE — 97035 APP MDLTY 1+ULTRASOUND EA 15: CPT | Performed by: OCCUPATIONAL THERAPIST

## 2021-09-09 PROCEDURE — 97022 WHIRLPOOL THERAPY: CPT | Performed by: OCCUPATIONAL THERAPIST

## 2021-09-09 NOTE — PROGRESS NOTES
Subjective: I have tenderness in the right hand palm and wrist.      Objective:     Current level of performance:  ADL: Independent  Work: No restrictions  Leisure: Not addressed    Measurements/Tests:  ROM:         Full right hand composite fist.

## 2021-09-16 ENCOUNTER — OFFICE VISIT (OUTPATIENT)
Dept: SURGERY | Facility: CLINIC | Age: 61
End: 2021-09-16
Payer: COMMERCIAL

## 2021-09-16 DIAGNOSIS — M25.641 JOINT STIFFNESS OF HAND, RIGHT: Primary | ICD-10-CM

## 2021-09-16 DIAGNOSIS — M62.81 DISTAL MUSCLE WEAKNESS: ICD-10-CM

## 2021-09-16 PROCEDURE — 97035 APP MDLTY 1+ULTRASOUND EA 15: CPT | Performed by: OCCUPATIONAL THERAPIST

## 2021-09-16 NOTE — PROGRESS NOTES
Subjective: I am feeling better      Objective:     Current level of performance:  ADL: Independent with self care task needs  Work: Full duty  Leisure: Not addressed    Measurements/Tests:  ROM:             N/A    Treatment Provided this day: Ultrasound t

## 2021-09-23 ENCOUNTER — APPOINTMENT (OUTPATIENT)
Dept: SURGERY | Facility: CLINIC | Age: 61
End: 2021-09-23
Payer: COMMERCIAL

## 2021-09-23 DIAGNOSIS — M62.81 DISTAL MUSCLE WEAKNESS: ICD-10-CM

## 2021-09-23 DIAGNOSIS — M25.641 JOINT STIFFNESS OF HAND, RIGHT: Primary | ICD-10-CM

## 2021-09-23 PROCEDURE — 97035 APP MDLTY 1+ULTRASOUND EA 15: CPT | Performed by: OCCUPATIONAL THERAPIST

## 2021-09-24 DIAGNOSIS — E11.65 UNCONTROLLED TYPE 2 DIABETES MELLITUS WITH HYPERGLYCEMIA, WITHOUT LONG-TERM CURRENT USE OF INSULIN (HCC): ICD-10-CM

## 2021-09-24 DIAGNOSIS — R53.83 LETHARGY: ICD-10-CM

## 2021-09-24 DIAGNOSIS — R63.1 POLYDIPSIA: ICD-10-CM

## 2021-09-24 DIAGNOSIS — R63.4 WEIGHT LOSS: ICD-10-CM

## 2021-09-24 DIAGNOSIS — R35.89 POLYURIA: ICD-10-CM

## 2021-09-26 RX ORDER — METFORMIN HYDROCHLORIDE 500 MG/1
TABLET, EXTENDED RELEASE ORAL
Qty: 360 TABLET | Refills: 0 | Status: SHIPPED | OUTPATIENT
Start: 2021-09-26 | End: 2021-12-24

## 2021-09-29 NOTE — TELEPHONE ENCOUNTER
Spoke, with the patient and informed her of the message below. Pt states that she will call back and schedule an appointment.

## 2021-10-04 NOTE — PROGRESS NOTES
Subjective: I am doing much better      Objective:     Current level of performance:  ADL: Independent  Work: Full work status, no restrictions  Leisure: Not addressed.     Measurements/Tests:  ROM:         No limitations with work, self care and or leisure

## 2021-10-07 RX ORDER — ERTUGLIFLOZIN 15 MG/1
15 TABLET, FILM COATED ORAL DAILY
Qty: 90 TABLET | Refills: 1 | Status: SHIPPED | OUTPATIENT
Start: 2021-10-07 | End: 2022-02-28

## 2021-10-07 NOTE — TELEPHONE ENCOUNTER
Refill passed per [employer] protocol.      Requested Prescriptions   Pending Prescriptions Disp Refills    STEGLATRO 15 MG Oral Tab tablet [Pharmacy Med Name: STEGLATRO TABS 15MG] 90 tablet 3     Sig: TAKE 1 TABLET DAILY        Diabetes Medication Protocol Passed - 10/7/2021 12:17 AM        Passed - Last A1C < 7.5 and within past 6 months     Lab Results   Component Value Date    A1C 6.5 (H) 04/17/2021               Passed - Appointment in past 6 or next 3 months        Passed - GFR Non- > 50     Lab Results   Component Value Date    GFRNAA 63 04/17/2021                 Passed - GFR in the past 12 months                Recent Outpatient Visits              3 weeks ago Joint stiffness of hand, right    [address] [facility],2Nd Floor, [address], [facility], [address] Visit    4 weeks ago Joint stiffness of hand, right    [address] [place],[address], [address], [address], [address] Visit    1 month ago Bilateral carpal tunnel syndrome    [address] [facility],2Nd Floor, [address], Ayla Drew MD    Office Visit    1 month ago Joint stiffness of hand, right    [address] [place],[address] Floor, [address], [address], [address] Visit    1 month ago Joint stiffness of hand, right    [address], [facility]    Office Visit             Future Appointments         Provider Department Appt Notes    In 3 months Tj Ennis MD [facility] Dermatology F/U 4=5 MONTHS

## 2021-11-05 DIAGNOSIS — F32.A DEPRESSIVE DISORDER: ICD-10-CM

## 2021-11-05 DIAGNOSIS — F41.9 ANXIETY: ICD-10-CM

## 2021-11-05 RX ORDER — VENLAFAXINE HYDROCHLORIDE 150 MG/1
150 CAPSULE, EXTENDED RELEASE ORAL DAILY
Qty: 90 CAPSULE | Refills: 1 | Status: SHIPPED | OUTPATIENT
Start: 2021-11-05 | End: 2022-02-28

## 2021-11-05 NOTE — TELEPHONE ENCOUNTER
Refill passed per [address] [place] [address] [facility] protocol.      Requested Prescriptions   Pending Prescriptions Disp Refills    VENLAFAXINE 150 MG Oral Capsule SR 24 Hr [Pharmacy Med Name: VENLAFAXINE HCL ER CAPS 150MG] 90 capsule 3     Sig: TAKE 1 CAPSULE DAILY (DUE FOR OFFICE VISIT, NO FURTHER REFILLS)        Psychiatric Non-Scheduled (Anti-Anxiety) Passed - 11/5/2021 12:15 AM        Passed - Appointment in last 6 or next 3 months               Future Appointments         Provider Department Appt Notes    In 2 months Rivas Garcia MD [facility] Dermatology F/U 4=5 MONTHS             Recent Outpatient Visits              1 month ago Joint stiffness of hand, right    [address] [facility],2Nd Floor, [address], [facility], [address] [employer] [place] Visit    1 month ago Joint stiffness of hand, right    [address] [place],[address], [address], [address] [employer]    Office Visit    2 months ago Bilateral carpal tunnel syndrome    [address] [facility],2Nd Floor, [address], Vasyl Mg MD    Office Visit    2 months ago Joint stiffness of hand, right    [address] [facility],2Nd Floor, [address], [address], [address] Visit    2 months ago Joint stiffness of hand, right    [facility] [employer]    Office Visit

## 2021-11-29 ENCOUNTER — APPOINTMENT (OUTPATIENT)
Dept: SURGERY | Facility: CLINIC | Age: 61
End: 2021-11-29
Payer: COMMERCIAL

## 2021-11-29 DIAGNOSIS — M79.601 PAIN OF RIGHT UPPER EXTREMITY: Primary | ICD-10-CM

## 2021-11-30 NOTE — PROGRESS NOTES
Subjective: I have pain in the lower palm of my right hand.       Objective:     Current level of performance:  ADL: Independent  Work: Not addressed  Leisure: Not addressed    Measurements/Tests:  ROM:         Full right hand composite fist.         Treatm

## 2021-12-23 DIAGNOSIS — R35.89 POLYURIA: ICD-10-CM

## 2021-12-23 DIAGNOSIS — R63.1 POLYDIPSIA: ICD-10-CM

## 2021-12-23 DIAGNOSIS — R63.4 WEIGHT LOSS: ICD-10-CM

## 2021-12-23 DIAGNOSIS — R53.83 LETHARGY: ICD-10-CM

## 2021-12-23 DIAGNOSIS — E11.65 UNCONTROLLED TYPE 2 DIABETES MELLITUS WITH HYPERGLYCEMIA, WITHOUT LONG-TERM CURRENT USE OF INSULIN (HCC): ICD-10-CM

## 2021-12-24 RX ORDER — METFORMIN HYDROCHLORIDE 500 MG/1
TABLET, EXTENDED RELEASE ORAL
Qty: 360 TABLET | Refills: 3 | Status: SHIPPED | OUTPATIENT
Start: 2021-12-24

## 2022-01-07 ENCOUNTER — TELEPHONE (OUTPATIENT)
Dept: PULMONOLOGY | Facility: CLINIC | Age: 62
End: 2022-01-07

## 2022-01-08 ENCOUNTER — TELEMEDICINE (OUTPATIENT)
Dept: FAMILY MEDICINE CLINIC | Facility: CLINIC | Age: 62
End: 2022-01-08

## 2022-01-08 ENCOUNTER — LAB ENCOUNTER (OUTPATIENT)
Dept: LAB | Age: 62
End: 2022-01-08
Attending: FAMILY MEDICINE
Payer: COMMERCIAL

## 2022-01-08 DIAGNOSIS — J02.9 SORE THROAT: ICD-10-CM

## 2022-01-08 DIAGNOSIS — J02.9 SORE THROAT: Primary | ICD-10-CM

## 2022-01-08 PROCEDURE — 99213 OFFICE O/P EST LOW 20 MIN: CPT | Performed by: FAMILY MEDICINE

## 2022-01-08 NOTE — PROGRESS NOTES
Virtual Telephone Check-In    Hernesto Ro verbally consents to a Virtual/Telephone Check-In visit on 01/08/22. Patient has been referred to the [facility] website at [email or web address] to review the yearly Consent to Treat document.     Patient Mariela Jones tablet, Rfl: 3  Blood Glucose Monitoring Suppl (ONETOUCH ULTRA 2) w/Device Does not apply Kit, CHECK SUGARS BID AND PRN, Disp: , Rfl:   ONETOUCH ULTRA In Vitro Strip, USE BID AND PRN, Disp: , Rfl:   OneTouch Delica Lancets 64W Does not apply Misc, USE BID 10/10/2020        Years since quittin.2      Smokeless tobacco: Former User      Tobacco comment: hx: quit 2007    Vaping Use      Vaping Use: Former    Alcohol use: Yes      Alcohol/week: 0.0 standard drinks      Comment: rarely    Drug use:  No

## 2022-01-10 LAB — SARS-COV-2 RNA RESP QL NAA+PROBE: DETECTED

## 2022-01-10 NOTE — PROGRESS NOTES
[facility] are still positive for covid 19. You can return to work 10 days after your first positive result. You do not need to keep retesting.  That is not recommended by the CDC. - Dr. Ambrocio Mejia

## 2022-01-24 ENCOUNTER — TELEPHONE (OUTPATIENT)
Dept: FAMILY MEDICINE CLINIC | Facility: CLINIC | Age: 62
End: 2022-01-24

## 2022-01-24 DIAGNOSIS — E78.2 MIXED HYPERLIPIDEMIA: ICD-10-CM

## 2022-01-24 DIAGNOSIS — E11.65 UNCONTROLLED TYPE 2 DIABETES MELLITUS WITH HYPERGLYCEMIA, WITHOUT LONG-TERM CURRENT USE OF INSULIN (HCC): Primary | ICD-10-CM

## 2022-01-25 NOTE — TELEPHONE ENCOUNTER
Spoke with patient verified name and , informed patient of message below, she agrees and verbalized understanding.

## 2022-01-26 ENCOUNTER — OFFICE VISIT (OUTPATIENT)
Dept: DERMATOLOGY CLINIC | Facility: CLINIC | Age: 62
End: 2022-01-26
Payer: COMMERCIAL

## 2022-01-26 DIAGNOSIS — Z85.820 PERSONAL HISTORY OF MALIGNANT MELANOMA OF SKIN: ICD-10-CM

## 2022-01-26 DIAGNOSIS — Z80.8 FAMILY HISTORY OF MELANOMA: ICD-10-CM

## 2022-01-26 DIAGNOSIS — Z86.018 HISTORY OF DYSPLASTIC NEVUS: ICD-10-CM

## 2022-01-26 DIAGNOSIS — D23.9 BENIGN NEOPLASM OF SKIN, UNSPECIFIED LOCATION: ICD-10-CM

## 2022-01-26 DIAGNOSIS — L82.1 SEBORRHEIC KERATOSES: ICD-10-CM

## 2022-01-26 DIAGNOSIS — D22.9 MULTIPLE NEVI: Primary | ICD-10-CM

## 2022-01-26 PROCEDURE — 99213 OFFICE O/P EST LOW 20 MIN: CPT | Performed by: DERMATOLOGY

## 2022-02-01 ENCOUNTER — HOSPITAL ENCOUNTER (OUTPATIENT)
Dept: GENERAL RADIOLOGY | Age: 62
Discharge: HOME | End: 2022-02-01
Attending: FAMILY MEDICINE
Payer: COMMERCIAL

## 2022-02-01 ENCOUNTER — NURSE TRIAGE (OUTPATIENT)
Dept: FAMILY MEDICINE CLINIC | Facility: CLINIC | Age: 62
End: 2022-02-01

## 2022-02-01 ENCOUNTER — OFFICE VISIT (OUTPATIENT)
Dept: FAMILY MEDICINE CLINIC | Facility: CLINIC | Age: 62
End: 2022-02-01
Payer: COMMERCIAL

## 2022-02-01 VITALS
RESPIRATION RATE: 18 BRPM | HEIGHT: 69 IN | HEART RATE: 93 BPM | BODY MASS INDEX: 31.62 KG/M2 | DIASTOLIC BLOOD PRESSURE: 70 MMHG | WEIGHT: 213.5 LBS | SYSTOLIC BLOOD PRESSURE: 147 MMHG

## 2022-02-01 DIAGNOSIS — S69.91XA INJURY OF RIGHT WRIST, INITIAL ENCOUNTER: ICD-10-CM

## 2022-02-01 DIAGNOSIS — S69.91XA INJURY OF RIGHT WRIST, INITIAL ENCOUNTER: Primary | ICD-10-CM

## 2022-02-01 PROCEDURE — 3078F DIAST BP <80 MM HG: CPT | Performed by: FAMILY MEDICINE

## 2022-02-01 PROCEDURE — 3077F SYST BP >= 140 MM HG: CPT | Performed by: FAMILY MEDICINE

## 2022-02-01 PROCEDURE — 73110 X-RAY EXAM OF WRIST: CPT | Performed by: FAMILY MEDICINE

## 2022-02-01 PROCEDURE — 99214 OFFICE O/P EST MOD 30 MIN: CPT | Performed by: FAMILY MEDICINE

## 2022-02-01 PROCEDURE — 3008F BODY MASS INDEX DOCD: CPT | Performed by: FAMILY MEDICINE

## 2022-02-15 DIAGNOSIS — J45.21 MILD INTERMITTENT ASTHMA WITH ACUTE EXACERBATION: ICD-10-CM

## 2022-02-16 RX ORDER — BUDESONIDE AND FORMOTEROL FUMARATE DIHYDRATE 80; 4.5 UG/1; UG/1
2 AEROSOL RESPIRATORY (INHALATION) 2 TIMES DAILY
Qty: 10.2 G | Refills: 11 | Status: SHIPPED | OUTPATIENT
Start: 2022-02-16

## 2022-02-16 NOTE — TELEPHONE ENCOUNTER
Refill passed per [facility] protocol    Requested Prescriptions   Pending Prescriptions Disp Refills    SYMBICORT 80-4.5 MCG/ACT Inhalation Aerosol [Pharmacy Med Name: SYMBICORT INH AEROSOL 80/4.5MCG] 10.2 g 11     Sig: USE 2 INHALATIONS TWICE A DAY        Asthma & COPD Medication Protocol Passed - 2/15/2022  3:34 PM        Passed - Appointment in past 6 or next 3 months              Future Appointments         Provider Department Appt Notes    In 1 week Hayley Chong [place], [address] [place] [address], [address], Luis Armando    policy informed            Recent Outpatient Visits              2 weeks ago Injury of right wrist, initial encounter    [facility], Luis Armando Taylor MD    Office Visit    3 weeks ago [facility] Masood Moscoso MD    Office Visit    1 month ago Sore throat    [facility], [facility], [place] Arm[place] Philippe MD    Telemedicine    5 months ago Joint stiffness of hand, right    [address] [place] [address],2Nd Floor, [employer], [address], [address] [place] [address] Visit    5 months ago Joint stiffness of hand, right    [facility], [address], [employer]    Office Visit

## 2022-02-19 ENCOUNTER — LAB ENCOUNTER (OUTPATIENT)
Dept: LAB | Age: 62
End: 2022-02-19
Attending: FAMILY MEDICINE
Payer: COMMERCIAL

## 2022-02-19 DIAGNOSIS — E11.65 UNCONTROLLED TYPE 2 DIABETES MELLITUS WITH HYPERGLYCEMIA, WITHOUT LONG-TERM CURRENT USE OF INSULIN (HCC): ICD-10-CM

## 2022-02-19 DIAGNOSIS — E78.2 MIXED HYPERLIPIDEMIA: ICD-10-CM

## 2022-02-19 LAB
ALT SERPL-CCNC: 65 U/L
ANION GAP SERPL CALC-SCNC: 7 MMOL/L (ref 0–18)
AST SERPL-CCNC: 38 U/L (ref 15–37)
BUN BLD-MCNC: 15 MG/DL (ref 7–18)
BUN/CREAT SERPL: 14.6 (ref 10–20)
CALCIUM BLD-MCNC: 9.6 MG/DL (ref 8.5–10.1)
CHLORIDE SERPL-SCNC: 107 MMOL/L (ref 98–112)
CHOLEST SERPL-MCNC: 182 MG/DL (ref ?–200)
CO2 SERPL-SCNC: 26 MMOL/L (ref 21–32)
CREAT BLD-MCNC: 1.03 MG/DL
CREAT UR-SCNC: 118 MG/DL
EST. AVERAGE GLUCOSE BLD GHB EST-MCNC: 146 MG/DL (ref 68–126)
FASTING PATIENT LIPID ANSWER: YES
GLUCOSE BLD-MCNC: 120 MG/DL (ref 70–99)
HBA1C MFR BLD: 6.7 % (ref ?–5.7)
LDLC SERPL CALC-MCNC: 94 MG/DL (ref ?–100)
MICROALBUMIN UR-MCNC: 15.4 MG/DL
MICROALBUMIN/CREAT 24H UR-RTO: 130.5 UG/MG (ref ?–30)
NONHDLC SERPL-MCNC: 129 MG/DL (ref ?–130)
OSMOLALITY SERPL CALC.SUM OF ELEC: 292 MOSM/KG (ref 275–295)
POTASSIUM SERPL-SCNC: 4.7 MMOL/L (ref 3.5–5.1)
SODIUM SERPL-SCNC: 140 MMOL/L (ref 136–145)
TRIGL SERPL-MCNC: 210 MG/DL (ref 30–149)
VLDLC SERPL CALC-MCNC: 34 MG/DL (ref 0–30)

## 2022-02-19 PROCEDURE — 80048 BASIC METABOLIC PNL TOTAL CA: CPT

## 2022-02-19 PROCEDURE — 3044F HG A1C LEVEL LT 7.0%: CPT | Performed by: FAMILY MEDICINE

## 2022-02-19 PROCEDURE — 84450 TRANSFERASE (AST) (SGOT): CPT

## 2022-02-19 PROCEDURE — 80061 LIPID PANEL: CPT

## 2022-02-19 PROCEDURE — 83036 HEMOGLOBIN GLYCOSYLATED A1C: CPT

## 2022-02-19 PROCEDURE — 82043 UR ALBUMIN QUANTITATIVE: CPT

## 2022-02-19 PROCEDURE — 3060F POS MICROALBUMINURIA REV: CPT | Performed by: FAMILY MEDICINE

## 2022-02-19 PROCEDURE — 3061F NEG MICROALBUMINURIA REV: CPT | Performed by: FAMILY MEDICINE

## 2022-02-19 PROCEDURE — 82570 ASSAY OF URINE CREATININE: CPT

## 2022-02-19 PROCEDURE — 36415 COLL VENOUS BLD VENIPUNCTURE: CPT

## 2022-02-19 PROCEDURE — 84460 ALANINE AMINO (ALT) (SGPT): CPT

## 2022-02-21 NOTE — TELEPHONE ENCOUNTER
DR PIERSON- please advise on venlafaxine as listed as pt reported med. Patient is postop day #5 repair of wound dehiscence.  His wound is open but needs to have more attention as far as daily dressing changes.  His fascia so far has remained intact.  Retention sutures are in place.  There is some ongoing purulence of this terminal wound and most likely this is giving him the leukocytosis.  His white blood cell count is 15.8.  He will need to be transfused for his hemoglobin of 6.5 this morning.  His colostomy is starting to function.  I think we can start giving him full liquids and hopefully he can have regular food by tomorrow.  We will continue IV antibiotics.  Will have wound care and the ostomy nurse evaluate him.

## 2022-02-28 ENCOUNTER — OFFICE VISIT (OUTPATIENT)
Dept: FAMILY MEDICINE CLINIC | Facility: CLINIC | Age: 62
End: 2022-02-28
Payer: COMMERCIAL

## 2022-02-28 VITALS
SYSTOLIC BLOOD PRESSURE: 109 MMHG | HEIGHT: 69 IN | WEIGHT: 214 LBS | DIASTOLIC BLOOD PRESSURE: 73 MMHG | BODY MASS INDEX: 31.7 KG/M2 | HEART RATE: 87 BPM | TEMPERATURE: 97 F

## 2022-02-28 DIAGNOSIS — K27.9 PUD (PEPTIC ULCER DISEASE): ICD-10-CM

## 2022-02-28 DIAGNOSIS — E78.2 MIXED HYPERLIPIDEMIA: ICD-10-CM

## 2022-02-28 DIAGNOSIS — R80.9 CONTROLLED TYPE 2 DIABETES MELLITUS WITH MICROALBUMINURIA, WITHOUT LONG-TERM CURRENT USE OF INSULIN (HCC): Primary | ICD-10-CM

## 2022-02-28 DIAGNOSIS — E11.29 CONTROLLED TYPE 2 DIABETES MELLITUS WITH MICROALBUMINURIA, WITHOUT LONG-TERM CURRENT USE OF INSULIN (HCC): Primary | ICD-10-CM

## 2022-02-28 DIAGNOSIS — M65.311 TRIGGER THUMB OF RIGHT HAND: ICD-10-CM

## 2022-02-28 DIAGNOSIS — F41.9 ANXIETY: ICD-10-CM

## 2022-02-28 DIAGNOSIS — E11.9 TYPE 2 DIABETES MELLITUS WITHOUT RETINOPATHY (HCC): ICD-10-CM

## 2022-02-28 DIAGNOSIS — F32.A DEPRESSIVE DISORDER: ICD-10-CM

## 2022-02-28 PROCEDURE — 99214 OFFICE O/P EST MOD 30 MIN: CPT | Performed by: FAMILY MEDICINE

## 2022-02-28 PROCEDURE — 3008F BODY MASS INDEX DOCD: CPT | Performed by: FAMILY MEDICINE

## 2022-02-28 PROCEDURE — 3074F SYST BP LT 130 MM HG: CPT | Performed by: FAMILY MEDICINE

## 2022-02-28 PROCEDURE — 3078F DIAST BP <80 MM HG: CPT | Performed by: FAMILY MEDICINE

## 2022-02-28 RX ORDER — MELOXICAM 15 MG/1
15 TABLET ORAL DAILY
Qty: 30 TABLET | Refills: 0 | Status: SHIPPED | OUTPATIENT
Start: 2022-02-28 | End: 2022-03-30

## 2022-02-28 RX ORDER — ERTUGLIFLOZIN 15 MG/1
15 TABLET, FILM COATED ORAL DAILY
Qty: 90 TABLET | Refills: 3 | Status: SHIPPED | OUTPATIENT
Start: 2022-02-28

## 2022-02-28 RX ORDER — VENLAFAXINE HYDROCHLORIDE 225 MG/1
225 TABLET, EXTENDED RELEASE ORAL EVERY MORNING
Qty: 90 TABLET | Refills: 2 | Status: SHIPPED | OUTPATIENT
Start: 2022-02-28 | End: 2022-03-30

## 2022-02-28 RX ORDER — PANTOPRAZOLE SODIUM 40 MG/1
40 TABLET, DELAYED RELEASE ORAL DAILY
Qty: 90 TABLET | Refills: 3 | Status: SHIPPED | OUTPATIENT
Start: 2022-02-28

## 2022-02-28 RX ORDER — ATORVASTATIN CALCIUM 80 MG/1
80 TABLET, FILM COATED ORAL NIGHTLY
Qty: 90 TABLET | Refills: 3 | Status: SHIPPED | OUTPATIENT
Start: 2022-02-28

## 2022-02-28 NOTE — PATIENT INSTRUCTIONS
Call [phone], option #3 for the COVID-19 booster. Wear the splint on the right thumb just at bedtime for the next month. If after 1 month you are still having a trigger thumb please see me for steroid injection.

## 2022-03-05 ENCOUNTER — IMMUNIZATION (OUTPATIENT)
Dept: LAB | Age: 62
End: 2022-03-05
Attending: EMERGENCY MEDICINE
Payer: COMMERCIAL

## 2022-03-05 DIAGNOSIS — Z23 NEED FOR VACCINATION: Primary | ICD-10-CM

## 2022-03-05 PROCEDURE — 0054A SARSCOV2 VAC 30MCG TRS SUCR: CPT

## 2022-04-04 ENCOUNTER — OFFICE VISIT (OUTPATIENT)
Dept: FAMILY MEDICINE CLINIC | Facility: CLINIC | Age: 62
End: 2022-04-04
Payer: COMMERCIAL

## 2022-04-04 VITALS
HEIGHT: 69 IN | HEART RATE: 93 BPM | SYSTOLIC BLOOD PRESSURE: 130 MMHG | BODY MASS INDEX: 31.16 KG/M2 | WEIGHT: 210.38 LBS | DIASTOLIC BLOOD PRESSURE: 67 MMHG | TEMPERATURE: 97 F

## 2022-04-04 DIAGNOSIS — M65.311 TRIGGER THUMB OF RIGHT HAND: Primary | ICD-10-CM

## 2022-04-04 DIAGNOSIS — G89.29 CHRONIC PAIN OF RIGHT THUMB: ICD-10-CM

## 2022-04-04 DIAGNOSIS — M79.644 CHRONIC PAIN OF RIGHT THUMB: ICD-10-CM

## 2022-04-04 PROCEDURE — 20550 NJX 1 TENDON SHEATH/LIGAMENT: CPT | Performed by: FAMILY MEDICINE

## 2022-04-04 PROCEDURE — 99212 OFFICE O/P EST SF 10 MIN: CPT | Performed by: FAMILY MEDICINE

## 2022-04-04 PROCEDURE — 3078F DIAST BP <80 MM HG: CPT | Performed by: FAMILY MEDICINE

## 2022-04-04 PROCEDURE — 3075F SYST BP GE 130 - 139MM HG: CPT | Performed by: FAMILY MEDICINE

## 2022-04-04 PROCEDURE — 3008F BODY MASS INDEX DOCD: CPT | Performed by: FAMILY MEDICINE

## 2022-04-04 RX ORDER — TRIAMCINOLONE ACETONIDE 40 MG/ML
20 INJECTION, SUSPENSION INTRA-ARTICULAR; INTRAMUSCULAR ONCE
Status: COMPLETED | OUTPATIENT
Start: 2022-04-04 | End: 2022-04-04

## 2022-04-04 RX ADMIN — TRIAMCINOLONE ACETONIDE 20 MG: 40 INJECTION, SUSPENSION INTRA-ARTICULAR; INTRAMUSCULAR at 10:14:00

## 2022-04-22 RX ORDER — CETIRIZINE HYDROCHLORIDE 10 MG/1
10 TABLET ORAL DAILY
Qty: 90 TABLET | Refills: 1 | Status: SHIPPED | OUTPATIENT
Start: 2022-04-22

## 2022-04-22 NOTE — TELEPHONE ENCOUNTER
Refill passed per [facility] protocol.     Requested Prescriptions   Pending Prescriptions Disp Refills    CETIRIZINE 10 MG Oral Tab [Pharmacy Med Name: CETIRIZINE TABS-OTC 10MG] 90 tablet 3     Sig: TAKE 1 TABLET DAILY        Allergy Medication Protocol Passed - 4/22/2022 12:24 AM        Passed - Appoinment in past 12 or next 3 months            Recent Outpatient Visits              2 weeks ago Trigger thumb of right hand    [address], [place]    Office Visit    1 month ago Controlled type 2 diabetes mellitus with microalbuminuria, without long-term current use of insulin ([facility]    [facility], [address], [address], [place]    Office Visit    2 months ago Injury of right wrist, initial encounter    [facility], [address], Luis Armando Bravo MD    Office Visit    2 months ago Multiple nevi    [facility] Ed Dickens MD    Office Visit    3 months ago Sore throat    [address] Kwong MD    Telemedicine          Future Appointments         Provider Department Appt Notes    In 3 months Ed Dickens MD [facility] full skin check

## 2022-06-13 RX ORDER — ALBUTEROL SULFATE 90 UG/1
2 AEROSOL, METERED RESPIRATORY (INHALATION) 4 TIMES DAILY PRN
Qty: 17 EACH | Refills: 1 | Status: SHIPPED | OUTPATIENT
Start: 2022-06-13 | End: 2022-10-24

## 2022-06-13 NOTE — TELEPHONE ENCOUNTER
Refill passed per [facility] protocol.     Requested Prescriptions   Pending Prescriptions Disp Refills    ALBUTEROL 108 (90 Base) MCG/ACT Inhalation Aero Soln [Pharmacy Med Name: ALBUTEROL HFA INHALER 8.5GM 90MCG] 17 g 4     Sig: USE 2 INHALATIONS FOUR TIMES A DAY AS NEEDED FOR WHEEZING        Asthma & COPD Medication Protocol Passed - 6/13/2022 12:36 AM        Passed - Appointment in past 6 or next 3 months              Recent Outpatient Visits              2 months ago Trigger thumb of right hand    [place] [facility] [employer], TONIE Arreguin [place], DO    Office Visit    3 months ago Controlled type 2 diabetes mellitus with microalbuminuria, without long-term current use of insulin ([facility]    [facility] [address], [place], DO    Office Visit    4 months ago Injury of right wrist, initial encounter    [facility] Add[facility] Bella Seamen, MD    Office Visit    4 months ago Multiple nevi    [facility] Marci Johnson MD    Office Visit    5 months ago Sore throat    [facility] [facility] [employer] [facility], Lillie [facility], MD    Telemedicine            Future Appointments         Provider Department Appt Notes    In 1 month Marci Johnson MD [facility] full skin check

## 2022-06-27 ENCOUNTER — OFFICE VISIT (OUTPATIENT)
Dept: FAMILY MEDICINE CLINIC | Facility: CLINIC | Age: 62
End: 2022-06-27
Payer: COMMERCIAL

## 2022-06-27 VITALS
HEIGHT: 69 IN | DIASTOLIC BLOOD PRESSURE: 81 MMHG | BODY MASS INDEX: 30.98 KG/M2 | HEART RATE: 87 BPM | WEIGHT: 209.19 LBS | TEMPERATURE: 98 F | SYSTOLIC BLOOD PRESSURE: 121 MMHG

## 2022-06-27 DIAGNOSIS — J44.9 CHRONIC OBSTRUCTIVE PULMONARY DISEASE, UNSPECIFIED COPD TYPE (HCC): ICD-10-CM

## 2022-06-27 DIAGNOSIS — R30.0 DYSURIA: Primary | ICD-10-CM

## 2022-06-27 LAB
APPEARANCE: CLEAR
BILIRUBIN: NEGATIVE
GLUCOSE (URINE DIPSTICK): >=1000 MG/DL
KETONES (URINE DIPSTICK): NEGATIVE MG/DL
LEUKOCYTES: NEGATIVE
MULTISTIX LOT#: ABNORMAL NUMERIC
NITRITE, URINE: NEGATIVE
PH, URINE: 5.5 (ref 4.5–8)
PROTEIN (URINE DIPSTICK): 30 MG/DL
SPECIFIC GRAVITY: 1.02 (ref 1–1.03)
URINE-COLOR: YELLOW
UROBILINOGEN,SEMI-QN: 0.2 MG/DL (ref 0–1.9)

## 2022-06-27 PROCEDURE — 3079F DIAST BP 80-89 MM HG: CPT | Performed by: FAMILY MEDICINE

## 2022-06-27 PROCEDURE — 81003 URINALYSIS AUTO W/O SCOPE: CPT | Performed by: FAMILY MEDICINE

## 2022-06-27 PROCEDURE — 3074F SYST BP LT 130 MM HG: CPT | Performed by: FAMILY MEDICINE

## 2022-06-27 PROCEDURE — 99213 OFFICE O/P EST LOW 20 MIN: CPT | Performed by: FAMILY MEDICINE

## 2022-06-27 PROCEDURE — 3008F BODY MASS INDEX DOCD: CPT | Performed by: FAMILY MEDICINE

## 2022-06-27 RX ORDER — NITROFURANTOIN 25; 75 MG/1; MG/1
100 CAPSULE ORAL 2 TIMES DAILY
Qty: 14 CAPSULE | Refills: 0 | Status: SHIPPED | OUTPATIENT
Start: 2022-06-27

## 2022-07-08 ENCOUNTER — HOSPITAL ENCOUNTER (OUTPATIENT)
Age: 62
Discharge: HOME | End: 2022-07-08
Payer: COMMERCIAL

## 2022-07-08 ENCOUNTER — APPOINTMENT (OUTPATIENT)
Dept: GENERAL RADIOLOGY | Age: 62
End: 2022-07-08
Attending: NURSE PRACTITIONER
Payer: COMMERCIAL

## 2022-07-08 ENCOUNTER — NURSE TRIAGE (OUTPATIENT)
Dept: FAMILY MEDICINE CLINIC | Facility: CLINIC | Age: 62
End: 2022-07-08

## 2022-07-08 VITALS
SYSTOLIC BLOOD PRESSURE: 139 MMHG | BODY MASS INDEX: 30.96 KG/M2 | TEMPERATURE: 98 F | HEIGHT: 69 IN | HEART RATE: 87 BPM | OXYGEN SATURATION: 97 % | DIASTOLIC BLOOD PRESSURE: 61 MMHG | WEIGHT: 209 LBS | RESPIRATION RATE: 24 BRPM

## 2022-07-08 DIAGNOSIS — J44.1 COPD EXACERBATION (HCC): Primary | ICD-10-CM

## 2022-07-08 DIAGNOSIS — F17.200 NEEDS SMOKING CESSATION EDUCATION: ICD-10-CM

## 2022-07-08 PROCEDURE — 71046 X-RAY EXAM CHEST 2 VIEWS: CPT | Performed by: NURSE PRACTITIONER

## 2022-07-08 PROCEDURE — 99204 OFFICE O/P NEW MOD 45 MIN: CPT | Performed by: NURSE PRACTITIONER

## 2022-07-08 RX ORDER — VENLAFAXINE HYDROCHLORIDE 225 MG/1
TABLET, EXTENDED RELEASE ORAL
COMMUNITY
Start: 2022-05-11

## 2022-07-08 RX ORDER — ALBUTEROL SULFATE 2.5 MG/3ML
2.5 SOLUTION RESPIRATORY (INHALATION) EVERY 4 HOURS PRN
Qty: 30 EACH | Refills: 0 | Status: SHIPPED | OUTPATIENT
Start: 2022-07-08 | End: 2022-08-07

## 2022-07-08 RX ORDER — PREDNISONE 20 MG/1
20 TABLET ORAL DAILY
Qty: 5 TABLET | Refills: 0 | Status: SHIPPED | OUTPATIENT
Start: 2022-07-08 | End: 2022-07-13

## 2022-07-08 NOTE — ED INITIAL ASSESSMENT (HPI)
Pt presents to the IC with c/o SOB, cough for past few days. Hx of COPD. Pt is in no apparent distress and speaking in complete sentences.

## 2022-07-25 ENCOUNTER — OFFICE VISIT (OUTPATIENT)
Dept: DERMATOLOGY CLINIC | Facility: CLINIC | Age: 62
End: 2022-07-25
Payer: COMMERCIAL

## 2022-07-25 DIAGNOSIS — D23.30 BENIGN NEOPLASM OF SKIN OF FACE: ICD-10-CM

## 2022-07-25 DIAGNOSIS — Z80.8 FAMILY HISTORY OF MELANOMA: ICD-10-CM

## 2022-07-25 DIAGNOSIS — D22.9 MULTIPLE NEVI: Primary | ICD-10-CM

## 2022-07-25 DIAGNOSIS — D23.70 BENIGN NEOPLASM OF SKIN OF LOWER LIMB, INCLUDING HIP, UNSPECIFIED LATERALITY: ICD-10-CM

## 2022-07-25 DIAGNOSIS — D23.4 BENIGN NEOPLASM OF SCALP AND SKIN OF NECK: ICD-10-CM

## 2022-07-25 DIAGNOSIS — Z86.018 HISTORY OF DYSPLASTIC NEVUS: ICD-10-CM

## 2022-07-25 DIAGNOSIS — D23.9 BENIGN NEOPLASM OF SKIN, UNSPECIFIED LOCATION: ICD-10-CM

## 2022-07-25 DIAGNOSIS — D23.60 BENIGN NEOPLASM OF SKIN OF UPPER LIMB, INCLUDING SHOULDER, UNSPECIFIED LATERALITY: ICD-10-CM

## 2022-07-25 DIAGNOSIS — L82.1 SEBORRHEIC KERATOSES: ICD-10-CM

## 2022-07-25 DIAGNOSIS — D23.5 BENIGN NEOPLASM OF SKIN OF TRUNK, EXCEPT SCROTUM: ICD-10-CM

## 2022-07-25 DIAGNOSIS — Z85.820 PERSONAL HISTORY OF MALIGNANT MELANOMA OF SKIN: ICD-10-CM

## 2022-07-25 PROCEDURE — 99213 OFFICE O/P EST LOW 20 MIN: CPT | Performed by: DERMATOLOGY

## 2022-09-26 ENCOUNTER — HOSPITAL ENCOUNTER (OUTPATIENT)
Age: 62
Discharge: HOME | End: 2022-09-26

## 2022-09-26 ENCOUNTER — NURSE TRIAGE (OUTPATIENT)
Dept: FAMILY MEDICINE CLINIC | Facility: CLINIC | Age: 62
End: 2022-09-26

## 2022-09-26 VITALS
HEIGHT: 69 IN | RESPIRATION RATE: 18 BRPM | TEMPERATURE: 98 F | SYSTOLIC BLOOD PRESSURE: 129 MMHG | OXYGEN SATURATION: 99 % | DIASTOLIC BLOOD PRESSURE: 71 MMHG | BODY MASS INDEX: 31.7 KG/M2 | HEART RATE: 93 BPM | WEIGHT: 214 LBS

## 2022-09-26 DIAGNOSIS — K61.1 PERIRECTAL ABSCESS: Primary | ICD-10-CM

## 2022-09-26 LAB — GLUCOSE BLDC GLUCOMTR-MCNC: 98 MG/DL (ref 70–99)

## 2022-09-26 PROCEDURE — 99213 OFFICE O/P EST LOW 20 MIN: CPT | Performed by: NURSE PRACTITIONER

## 2022-09-26 PROCEDURE — 82962 GLUCOSE BLOOD TEST: CPT | Performed by: NURSE PRACTITIONER

## 2022-09-26 RX ORDER — AMOXICILLIN AND CLAVULANATE POTASSIUM 875; 125 MG/1; MG/1
1 TABLET, FILM COATED ORAL 2 TIMES DAILY
Qty: 14 TABLET | Refills: 0 | Status: SHIPPED | OUTPATIENT
Start: 2022-09-26 | End: 2022-10-03

## 2022-09-26 NOTE — ED INITIAL ASSESSMENT (HPI)
Pt here w c/o possible abscess near rectum area x Friday. No drainage, no N/V/D. Denies any trouble having BM's. No fever.

## 2022-09-29 ENCOUNTER — OFFICE VISIT (OUTPATIENT)
Dept: FAMILY MEDICINE CLINIC | Facility: CLINIC | Age: 62
End: 2022-09-29

## 2022-09-29 VITALS
WEIGHT: 209 LBS | HEIGHT: 69 IN | DIASTOLIC BLOOD PRESSURE: 73 MMHG | HEART RATE: 91 BPM | BODY MASS INDEX: 30.96 KG/M2 | SYSTOLIC BLOOD PRESSURE: 113 MMHG | TEMPERATURE: 98 F

## 2022-09-29 DIAGNOSIS — E78.2 MIXED HYPERLIPIDEMIA: ICD-10-CM

## 2022-09-29 DIAGNOSIS — R80.9 CONTROLLED TYPE 2 DIABETES MELLITUS WITH MICROALBUMINURIA, WITHOUT LONG-TERM CURRENT USE OF INSULIN (HCC): ICD-10-CM

## 2022-09-29 DIAGNOSIS — E11.29 CONTROLLED TYPE 2 DIABETES MELLITUS WITH MICROALBUMINURIA, WITHOUT LONG-TERM CURRENT USE OF INSULIN (HCC): ICD-10-CM

## 2022-09-29 DIAGNOSIS — K61.1 PERIRECTAL ABSCESS: Primary | ICD-10-CM

## 2022-09-29 DIAGNOSIS — Z23 NEED FOR VACCINATION: ICD-10-CM

## 2022-09-29 PROCEDURE — 99214 OFFICE O/P EST MOD 30 MIN: CPT | Performed by: FAMILY MEDICINE

## 2022-09-29 PROCEDURE — 3078F DIAST BP <80 MM HG: CPT | Performed by: FAMILY MEDICINE

## 2022-09-29 PROCEDURE — 3074F SYST BP LT 130 MM HG: CPT | Performed by: FAMILY MEDICINE

## 2022-09-29 PROCEDURE — 90471 IMMUNIZATION ADMIN: CPT | Performed by: FAMILY MEDICINE

## 2022-09-29 PROCEDURE — 3008F BODY MASS INDEX DOCD: CPT | Performed by: FAMILY MEDICINE

## 2022-09-29 PROCEDURE — 90686 IIV4 VACC NO PRSV 0.5 ML IM: CPT | Performed by: FAMILY MEDICINE

## 2022-09-30 RX ORDER — SULFAMETHOXAZOLE AND TRIMETHOPRIM 800; 160 MG/1; MG/1
1 TABLET ORAL 2 TIMES DAILY
Qty: 14 TABLET | Refills: 0 | Status: SHIPPED | OUTPATIENT
Start: 2022-09-30 | End: 2022-10-07

## 2022-10-22 ENCOUNTER — LAB ENCOUNTER (OUTPATIENT)
Dept: LAB | Facility: REFERENCE LAB | Age: 62
End: 2022-10-22
Attending: FAMILY MEDICINE
Payer: COMMERCIAL

## 2022-10-22 DIAGNOSIS — E11.29 CONTROLLED TYPE 2 DIABETES MELLITUS WITH MICROALBUMINURIA, WITHOUT LONG-TERM CURRENT USE OF INSULIN (HCC): ICD-10-CM

## 2022-10-22 DIAGNOSIS — E78.2 MIXED HYPERLIPIDEMIA: ICD-10-CM

## 2022-10-22 DIAGNOSIS — R80.9 CONTROLLED TYPE 2 DIABETES MELLITUS WITH MICROALBUMINURIA, WITHOUT LONG-TERM CURRENT USE OF INSULIN (HCC): ICD-10-CM

## 2022-10-22 LAB
ALT SERPL-CCNC: 71 U/L
ANION GAP SERPL CALC-SCNC: 6 MMOL/L (ref 0–18)
AST SERPL-CCNC: 75 U/L (ref 15–37)
BASOPHILS # BLD AUTO: 0.02 X10(3) UL (ref 0–0.2)
BASOPHILS NFR BLD AUTO: 0.2 %
BUN BLD-MCNC: 17 MG/DL (ref 7–18)
BUN/CREAT SERPL: 15 (ref 10–20)
CALCIUM BLD-MCNC: 9.5 MG/DL (ref 8.5–10.1)
CHLORIDE SERPL-SCNC: 108 MMOL/L (ref 98–112)
CHOLEST SERPL-MCNC: 158 MG/DL (ref ?–200)
CO2 SERPL-SCNC: 28 MMOL/L (ref 21–32)
CREAT BLD-MCNC: 1.13 MG/DL
DEPRECATED RDW RBC AUTO: 51.6 FL (ref 35.1–46.3)
EOSINOPHIL # BLD AUTO: 0 X10(3) UL (ref 0–0.7)
EOSINOPHIL NFR BLD AUTO: 0 %
ERYTHROCYTE [DISTWIDTH] IN BLOOD BY AUTOMATED COUNT: 13.4 % (ref 11–15)
EST. AVERAGE GLUCOSE BLD GHB EST-MCNC: 137 MG/DL (ref 68–126)
FASTING PATIENT LIPID ANSWER: YES
FASTING STATUS PATIENT QL REPORTED: YES
GFR SERPLBLD BASED ON 1.73 SQ M-ARVRAT: 56 ML/MIN/1.73M2 (ref 60–?)
GLUCOSE BLD-MCNC: 127 MG/DL (ref 70–99)
HBA1C MFR BLD: 6.4 % (ref ?–5.7)
HCT VFR BLD AUTO: 49 %
HDLC SERPL-MCNC: 46 MG/DL (ref 40–59)
HGB BLD-MCNC: 15.8 G/DL
IMM GRANULOCYTES # BLD AUTO: 0.03 X10(3) UL (ref 0–1)
IMM GRANULOCYTES NFR BLD: 0.3 %
LDLC SERPL CALC-MCNC: 80 MG/DL (ref ?–100)
LYMPHOCYTES # BLD AUTO: 3.78 X10(3) UL (ref 1–4)
LYMPHOCYTES NFR BLD AUTO: 32.1 %
MCH RBC QN AUTO: 33.2 PG (ref 26–34)
MCHC RBC AUTO-ENTMCNC: 32.2 G/DL (ref 31–37)
MCV RBC AUTO: 102.9 FL
MONOCYTES # BLD AUTO: 0.68 X10(3) UL (ref 0.1–1)
MONOCYTES NFR BLD AUTO: 5.8 %
NEUTROPHILS # BLD AUTO: 7.25 X10 (3) UL (ref 1.5–7.7)
NEUTROPHILS # BLD AUTO: 7.25 X10(3) UL (ref 1.5–7.7)
NEUTROPHILS NFR BLD AUTO: 61.6 %
NONHDLC SERPL-MCNC: 112 MG/DL (ref ?–130)
OSMOLALITY SERPL CALC.SUM OF ELEC: 297 MOSM/KG (ref 275–295)
PLATELET # BLD AUTO: 256 10(3)UL (ref 150–450)
POTASSIUM SERPL-SCNC: 4.6 MMOL/L (ref 3.5–5.1)
RBC # BLD AUTO: 4.76 X10(6)UL
SODIUM SERPL-SCNC: 142 MMOL/L (ref 136–145)
TRIGL SERPL-MCNC: 188 MG/DL (ref 30–149)
TSI SER-ACNC: 1.81 MIU/ML (ref 0.36–3.74)
VLDLC SERPL CALC-MCNC: 30 MG/DL (ref 0–30)
WBC # BLD AUTO: 11.8 X10(3) UL (ref 4–11)

## 2022-10-22 PROCEDURE — 83036 HEMOGLOBIN GLYCOSYLATED A1C: CPT

## 2022-10-22 PROCEDURE — 84460 ALANINE AMINO (ALT) (SGPT): CPT

## 2022-10-22 PROCEDURE — 80061 LIPID PANEL: CPT

## 2022-10-22 PROCEDURE — 84443 ASSAY THYROID STIM HORMONE: CPT

## 2022-10-22 PROCEDURE — 3044F HG A1C LEVEL LT 7.0%: CPT | Performed by: FAMILY MEDICINE

## 2022-10-22 PROCEDURE — 80048 BASIC METABOLIC PNL TOTAL CA: CPT

## 2022-10-22 PROCEDURE — 85025 COMPLETE CBC W/AUTO DIFF WBC: CPT

## 2022-10-22 PROCEDURE — 84450 TRANSFERASE (AST) (SGOT): CPT

## 2022-10-22 PROCEDURE — 36415 COLL VENOUS BLD VENIPUNCTURE: CPT

## 2022-10-24 RX ORDER — ALBUTEROL SULFATE 90 UG/1
2 AEROSOL, METERED RESPIRATORY (INHALATION) 4 TIMES DAILY PRN
Qty: 3 EACH | Refills: 1 | Status: SHIPPED | OUTPATIENT
Start: 2022-10-24

## 2022-10-25 NOTE — TELEPHONE ENCOUNTER
Refill passed per [facility] protocol   Requested Prescriptions   Pending Prescriptions Disp Refills    ALBUTEROL 108 (90 Base) MCG/ACT Inhalation Aero Soln [Pharmacy Med Name: ALBUTEROL HFA INHALER 8.5GM 90MCG] 17 g 4     Sig: USE 2 INHALATIONS FOUR TIMES A DAY AS NEEDED FOR WHEEZING        Asthma & COPD Medication Protocol Passed - 10/24/2022 12:43 AM        Passed - In person appointment or virtual visit in the past 6 mos or appointment in next 3 mos       Recent Outpatient Visits              3 weeks ago Perirectal abscess    [facility], [address], [address] [place], DO    Office Visit    3 months ago Multiple nevi    [facility] Vijaya Fournier MD    Office Visit    3 months ago [facility], Agustin Casarez MD    Office Visit    6 months ago Trigger thumb of right hand    [address], [address], [place], DO    Office Visit    7 months ago Controlled type 2 diabetes mellitus with microalbuminuria, without long-term current use of insulin [facility]    [facility], [address], [address], [place], DO    Office Visit     Future Appointments         Provider Department Appt Notes    In 4 days Channing Carrington, [address] [place] [place] [address], [address], Luis Armando Diabetes Followup.     In 3 months Vijaya Fournier MD [facility] Derm[facility] 6 mos follow up

## 2022-10-28 ENCOUNTER — OFFICE VISIT (OUTPATIENT)
Dept: FAMILY MEDICINE CLINIC | Facility: CLINIC | Age: 62
End: 2022-10-28
Payer: COMMERCIAL

## 2022-10-28 VITALS
DIASTOLIC BLOOD PRESSURE: 64 MMHG | HEART RATE: 89 BPM | HEIGHT: 69 IN | BODY MASS INDEX: 30.66 KG/M2 | WEIGHT: 207 LBS | TEMPERATURE: 97 F | SYSTOLIC BLOOD PRESSURE: 126 MMHG

## 2022-10-28 DIAGNOSIS — R91.1 PULMONARY NODULE: ICD-10-CM

## 2022-10-28 DIAGNOSIS — R93.89 ABNORMAL CT SCAN, CHEST: ICD-10-CM

## 2022-10-28 DIAGNOSIS — E11.9 TYPE 2 DIABETES MELLITUS WITHOUT RETINOPATHY (HCC): ICD-10-CM

## 2022-10-28 DIAGNOSIS — Z12.31 VISIT FOR SCREENING MAMMOGRAM: ICD-10-CM

## 2022-10-28 DIAGNOSIS — Z12.4 CERVICAL CANCER SCREENING: ICD-10-CM

## 2022-10-28 DIAGNOSIS — E11.29 CONTROLLED TYPE 2 DIABETES MELLITUS WITH MICROALBUMINURIA, WITHOUT LONG-TERM CURRENT USE OF INSULIN (HCC): ICD-10-CM

## 2022-10-28 DIAGNOSIS — R80.9 CONTROLLED TYPE 2 DIABETES MELLITUS WITH MICROALBUMINURIA, WITHOUT LONG-TERM CURRENT USE OF INSULIN (HCC): ICD-10-CM

## 2022-10-28 DIAGNOSIS — Z12.11 SCREENING FOR COLON CANCER: ICD-10-CM

## 2022-10-28 DIAGNOSIS — Z00.00 ADULT GENERAL MEDICAL EXAM: Primary | ICD-10-CM

## 2022-10-28 PROCEDURE — 99396 PREV VISIT EST AGE 40-64: CPT | Performed by: FAMILY MEDICINE

## 2022-10-28 PROCEDURE — 99213 OFFICE O/P EST LOW 20 MIN: CPT | Performed by: FAMILY MEDICINE

## 2022-10-28 PROCEDURE — 3008F BODY MASS INDEX DOCD: CPT | Performed by: FAMILY MEDICINE

## 2022-10-28 PROCEDURE — 3074F SYST BP LT 130 MM HG: CPT | Performed by: FAMILY MEDICINE

## 2022-10-28 PROCEDURE — 3078F DIAST BP <80 MM HG: CPT | Performed by: FAMILY MEDICINE

## 2022-10-28 RX ORDER — ERTUGLIFLOZIN 15 MG/1
15 TABLET, FILM COATED ORAL DAILY
Qty: 90 TABLET | Refills: 3 | Status: SHIPPED | OUTPATIENT
Start: 2022-10-28

## 2022-10-28 RX ORDER — METFORMIN HYDROCHLORIDE 500 MG/1
1000 TABLET, EXTENDED RELEASE ORAL 2 TIMES DAILY WITH MEALS
Qty: 360 TABLET | Refills: 3 | Status: SHIPPED | OUTPATIENT
Start: 2022-10-28

## 2023-01-01 NOTE — LETTER
21      Patient: Renea Becerril  : 1960 Visit date: 2021    Dear Michaela Elias,      I examined your patient in follow-up today. She is 3 weeks post right endoscopic carpal tunnel release. Her preoperative symptoms are gone. 32.5

## 2023-01-25 ENCOUNTER — OFFICE VISIT (OUTPATIENT)
Dept: DERMATOLOGY CLINIC | Facility: CLINIC | Age: 63
End: 2023-01-25

## 2023-01-25 DIAGNOSIS — Z86.018 HISTORY OF DYSPLASTIC NEVUS: ICD-10-CM

## 2023-01-25 DIAGNOSIS — D22.9 MULTIPLE NEVI: Primary | ICD-10-CM

## 2023-01-25 DIAGNOSIS — Z80.8 FAMILY HISTORY OF MELANOMA: ICD-10-CM

## 2023-01-25 DIAGNOSIS — L82.1 SEBORRHEIC KERATOSES: ICD-10-CM

## 2023-01-25 DIAGNOSIS — Z85.820 PERSONAL HISTORY OF MALIGNANT MELANOMA OF SKIN: ICD-10-CM

## 2023-01-25 DIAGNOSIS — D23.9 BENIGN NEOPLASM OF SKIN, UNSPECIFIED LOCATION: ICD-10-CM

## 2023-01-25 PROCEDURE — 99213 OFFICE O/P EST LOW 20 MIN: CPT | Performed by: DERMATOLOGY

## 2023-02-14 ENCOUNTER — OFFICE VISIT (OUTPATIENT)
Dept: FAMILY MEDICINE CLINIC | Facility: CLINIC | Age: 63
End: 2023-02-14

## 2023-02-14 VITALS
WEIGHT: 210 LBS | BODY MASS INDEX: 31.1 KG/M2 | SYSTOLIC BLOOD PRESSURE: 114 MMHG | HEIGHT: 69 IN | HEART RATE: 92 BPM | DIASTOLIC BLOOD PRESSURE: 79 MMHG | TEMPERATURE: 97 F

## 2023-02-14 DIAGNOSIS — M65.311 TRIGGER THUMB OF RIGHT HAND: Primary | ICD-10-CM

## 2023-02-14 DIAGNOSIS — G56.01 CARPAL TUNNEL SYNDROME OF RIGHT WRIST: ICD-10-CM

## 2023-02-14 PROCEDURE — 3078F DIAST BP <80 MM HG: CPT | Performed by: FAMILY MEDICINE

## 2023-02-14 PROCEDURE — 3074F SYST BP LT 130 MM HG: CPT | Performed by: FAMILY MEDICINE

## 2023-02-14 PROCEDURE — 20550 NJX 1 TENDON SHEATH/LIGAMENT: CPT | Performed by: FAMILY MEDICINE

## 2023-02-14 PROCEDURE — 99213 OFFICE O/P EST LOW 20 MIN: CPT | Performed by: FAMILY MEDICINE

## 2023-02-14 PROCEDURE — 3008F BODY MASS INDEX DOCD: CPT | Performed by: FAMILY MEDICINE

## 2023-02-14 RX ORDER — TRIAMCINOLONE ACETONIDE 40 MG/ML
20 INJECTION, SUSPENSION INTRA-ARTICULAR; INTRAMUSCULAR ONCE
Status: COMPLETED | OUTPATIENT
Start: 2023-02-14 | End: 2023-02-14

## 2023-02-14 RX ADMIN — TRIAMCINOLONE ACETONIDE 20 MG: 40 INJECTION, SUSPENSION INTRA-ARTICULAR; INTRAMUSCULAR at 14:18:00

## 2023-04-14 DIAGNOSIS — K27.9 PUD (PEPTIC ULCER DISEASE): ICD-10-CM

## 2023-04-14 RX ORDER — PANTOPRAZOLE SODIUM 40 MG/1
40 TABLET, DELAYED RELEASE ORAL DAILY
Qty: 90 TABLET | Refills: 3 | Status: SHIPPED | OUTPATIENT
Start: 2023-04-14

## 2023-04-14 NOTE — TELEPHONE ENCOUNTER
Refill passed per [place] [employer] [place], [facility] protocol.   Requested Prescriptions   Pending Prescriptions Disp Refills    PANTOPRAZOLE 40 MG Oral Tab EC [Pharmacy Med Name: PANTOPRAZOLE SODIUM DR TABS 40MG] 90 tablet 3     Sig: TAKE 1 TABLET DAILY       Gastrointestional Medication Protocol Passed - 4/14/2023 12:24 AM        Passed - In person appointment or virtual visit in the past 12 mos or appointment in next 3 mos     Recent Outpatient Visits              1 month ago Trigger thumb of right hand    [address], [address], [address], [place]    Office Visit    2 months ago Multiple nevi    Ellen Gillis MD    Office Visit    5 months ago Adult general medical exam    [address] [facility], [address], [place]    Office Visit    6 months ago Perirectal abscess    [address], [address], [address], [place]    Office Visit    8 months ago Multiple brookei    Raquel Gillis MD    Office Visit          Future Appointments         Provider Department Appt Notes    In 3 months Rich Fallon MD [address], [address] [facility] Ellen Padilla 6 mos follow up

## 2023-04-17 DIAGNOSIS — J30.89 OTHER ALLERGIC RHINITIS: ICD-10-CM

## 2023-04-17 RX ORDER — CETIRIZINE HYDROCHLORIDE 10 MG/1
10 TABLET ORAL DAILY
Qty: 90 TABLET | Refills: 3 | Status: SHIPPED | OUTPATIENT
Start: 2023-04-17

## 2023-04-17 NOTE — TELEPHONE ENCOUNTER
Refill passed per [facility] protocol.       Requested Prescriptions   Pending Prescriptions Disp Refills    CETIRIZINE 10 MG Oral Tab [Pharmacy Med Name: CETIRIZINE TABS-OTC 10MG] 90 tablet 3     Sig: TAKE 1 TABLET DAILY       Allergy Medication Protocol Passed - 4/17/2023  1:20 AM        Passed - In person appointment or virtual visit in the past 12 mos or appointment in next 3 mos     Recent Outpatient Visits              2 months ago Trigger thumb of right hand    [facility] D[facility], [address], [address], [place], DO    Office Visit    2 months ago Multiple nevi    Ellen Alarcon MD    Office Visit    5 months ago Adult general medical exam    [facility] [address], [place], [place]    Office Visit    6 months ago Perirectal abscess    [facility], [address], [address], [place], [place]    Office Visit    8 months ago Multiple nevi    Sergio Alarcon MD    Office Visit          Future Appointments         Provider Department Appt Notes    In 3 months MD Etta Daley, [facility] 6 mos follow up                      Future Appointments         Provider Department Appt Notes    In 3 months MD Etta Daley, [address] [facility] 6 mos follow up             Recent Outpatient Visits              2 months ago Trigger thumb of right hand    [facility]ta Dodd, [address], [address], [place], [place]    Office Visit    2 months ago Multiple nevi    Ellen Alarcon MD    Office Visit    5 months ago Adult general medical exam    [facility] Luis Armando Cannon, DO    Office Visit    6 months ago Perirectal abscess    [facility], [address], [address], [place],     Office Visit    8 months ago Multiple nevi    Ellen Herrera MD    Office Visit

## 2023-04-21 RX ORDER — ALBUTEROL SULFATE 90 UG/1
2 AEROSOL, METERED RESPIRATORY (INHALATION) 4 TIMES DAILY PRN
Qty: 25.5 G | Refills: 3 | Status: SHIPPED | OUTPATIENT
Start: 2023-04-21

## 2023-04-21 NOTE — TELEPHONE ENCOUNTER
Refill passed per [address] protocol.     Requested Prescriptions   Pending Prescriptions Disp Refills    ALBUTEROL 108 (90 Base) MCG/ACT Inhalation Aero Soln [Pharmacy Med Name: ALBUTEROL HFA INHALER 8.5GM 90MCG] 25.5 g 3     Sig: USE 2 INHALATIONS FOUR TIMES A DAY AS NEEDED FOR WHEEZING       Asthma & COPD Medication Protocol Passed - 4/21/2023 12:39 AM        Passed - In person appointment or virtual visit in the past 6 mos or appointment in next 3 mos     Recent Outpatient Visits              2 months ago Trigger thumb of right hand    [address], [address], [place], [place]    Office Visit    2 months ago Multiple Ellen Hyman MD    Office Visit    5 months ago Adult general medical exam    [address] [facility], [address], [place], [place]    Office Visit    6 months ago Perirectal abscess    [facility], [address], [address], [place], [place]    Office Visit    9 months ago Multiple Del Hyman MD    Office Visit          Future Appointments         Provider Department Appt Notes    In 3 months MD Bayron Alejandre, [address] [facility] 6 mos follow up                     Recent Outpatient Visits              2 months ago Trigger thumb of right hand    [address], [address], [place], [place]    Office Visit    2 months ago Multiple Ellen Hyman MD    Office Visit    5 months ago Adult general medical exam    [address] [facility], [address], [place], [place]    Office Visit    6 months ago Perirectal abscess    [address], [address], [place], [place]    Office Visit    9 months ago [facility] nevi    [facility] Vicky Gruber [facility]    Office Visit            Future Appointments         Provider Department Appt Notes    In 3 months Tj Ennis MD [address], [address] [facility], [place] 6 mos follow up

## 2023-05-09 ENCOUNTER — TELEPHONE (OUTPATIENT)
Dept: FAMILY MEDICINE CLINIC | Facility: CLINIC | Age: 63
End: 2023-05-09

## 2023-07-26 ENCOUNTER — OFFICE VISIT (OUTPATIENT)
Dept: DERMATOLOGY CLINIC | Facility: CLINIC | Age: 63
End: 2023-07-26

## 2023-07-26 DIAGNOSIS — Z85.820 PERSONAL HISTORY OF MALIGNANT MELANOMA OF SKIN: ICD-10-CM

## 2023-07-26 DIAGNOSIS — D23.70 BENIGN NEOPLASM OF SKIN OF LOWER LIMB, INCLUDING HIP, UNSPECIFIED LATERALITY: ICD-10-CM

## 2023-07-26 DIAGNOSIS — D22.9 MULTIPLE NEVI: Primary | ICD-10-CM

## 2023-07-26 DIAGNOSIS — D23.30 BENIGN NEOPLASM OF SKIN OF FACE: ICD-10-CM

## 2023-07-26 DIAGNOSIS — D23.5 BENIGN NEOPLASM OF SKIN OF TRUNK: ICD-10-CM

## 2023-07-26 DIAGNOSIS — Z80.8 FAMILY HISTORY OF MELANOMA: ICD-10-CM

## 2023-07-26 DIAGNOSIS — Z86.018 HISTORY OF DYSPLASTIC NEVUS: ICD-10-CM

## 2023-07-26 DIAGNOSIS — D23.60 BENIGN NEOPLASM OF SKIN OF UPPER LIMB, INCLUDING SHOULDER, UNSPECIFIED LATERALITY: ICD-10-CM

## 2023-07-26 DIAGNOSIS — L82.1 SEBORRHEIC KERATOSES: ICD-10-CM

## 2023-07-26 DIAGNOSIS — D23.9 BENIGN NEOPLASM OF SKIN, UNSPECIFIED LOCATION: ICD-10-CM

## 2023-07-26 DIAGNOSIS — D23.4 BENIGN NEOPLASM OF SCALP AND SKIN OF NECK: ICD-10-CM

## 2023-07-26 PROCEDURE — 99213 OFFICE O/P EST LOW 20 MIN: CPT | Performed by: DERMATOLOGY

## 2023-07-27 ENCOUNTER — APPOINTMENT (OUTPATIENT)
Dept: GENERAL RADIOLOGY | Age: 63
End: 2023-07-27
Attending: PHYSICIAN ASSISTANT
Payer: COMMERCIAL

## 2023-07-27 ENCOUNTER — APPOINTMENT (OUTPATIENT)
Dept: ULTRASOUND IMAGING | Age: 63
End: 2023-07-27
Attending: PHYSICIAN ASSISTANT
Payer: COMMERCIAL

## 2023-07-27 ENCOUNTER — HOSPITAL ENCOUNTER (OUTPATIENT)
Age: 63
Discharge: HOME | End: 2023-07-27
Payer: COMMERCIAL

## 2023-07-27 ENCOUNTER — NURSE TRIAGE (OUTPATIENT)
Dept: FAMILY MEDICINE CLINIC | Facility: CLINIC | Age: 63
End: 2023-07-27

## 2023-07-27 VITALS
DIASTOLIC BLOOD PRESSURE: 74 MMHG | TEMPERATURE: 98 F | SYSTOLIC BLOOD PRESSURE: 149 MMHG | RESPIRATION RATE: 19 BRPM | OXYGEN SATURATION: 96 % | HEART RATE: 93 BPM

## 2023-07-27 DIAGNOSIS — M79.605 ACUTE PAIN OF LEFT LOWER EXTREMITY: Primary | ICD-10-CM

## 2023-07-27 PROCEDURE — 73560 X-RAY EXAM OF KNEE 1 OR 2: CPT | Performed by: PHYSICIAN ASSISTANT

## 2023-07-27 PROCEDURE — 73502 X-RAY EXAM HIP UNI 2-3 VIEWS: CPT | Performed by: PHYSICIAN ASSISTANT

## 2023-07-27 PROCEDURE — 73552 X-RAY EXAM OF FEMUR 2/>: CPT | Performed by: PHYSICIAN ASSISTANT

## 2023-07-27 PROCEDURE — 93971 EXTREMITY STUDY: CPT | Performed by: PHYSICIAN ASSISTANT

## 2023-07-27 PROCEDURE — 99213 OFFICE O/P EST LOW 20 MIN: CPT | Performed by: PHYSICIAN ASSISTANT

## 2023-07-27 RX ORDER — TIZANIDINE 4 MG/1
4 TABLET ORAL 3 TIMES DAILY
Qty: 21 TABLET | Refills: 0 | Status: SHIPPED | OUTPATIENT
Start: 2023-07-27 | End: 2023-08-03

## 2023-07-27 NOTE — TELEPHONE ENCOUNTER
Action Requested: Summary for Provider     []  Critical Lab, Recommendations Needed  [] Need Additional Advice  []   FYI    []   Need Orders  [] Need Medications Sent to Pharmacy  []  Other     SUMMARY: Per protocol: OV. Offered an appointment at our [place] [address] office but patient declined. She will go to [facility]. Follow up for IC made with Dr. Tirso Desai for next week. Future Appointments   Date Time Provider Ken Desai   8/1/2023 10:00 AM [facility]   1/26/2024 10:30 AM Marcelo Hayes MD [facility]     Reason for call: Leg Pain  Onset: Data Unavailable    Patient's left front thigh is painful. It started Monday. She can walk but it is getting more difficult to walk. She denies redness or swelling.      Reason for Disposition   Severe (excruciating) pain    Protocols used: Leg Pain-P-OH

## 2023-08-01 ENCOUNTER — OFFICE VISIT (OUTPATIENT)
Dept: FAMILY MEDICINE CLINIC | Facility: CLINIC | Age: 63
End: 2023-08-01

## 2023-08-01 VITALS
WEIGHT: 205.38 LBS | DIASTOLIC BLOOD PRESSURE: 77 MMHG | HEIGHT: 69 IN | TEMPERATURE: 98 F | HEART RATE: 86 BPM | BODY MASS INDEX: 30.42 KG/M2 | SYSTOLIC BLOOD PRESSURE: 128 MMHG

## 2023-08-01 DIAGNOSIS — M79.652 ACUTE THIGH PAIN, LEFT: ICD-10-CM

## 2023-08-01 DIAGNOSIS — Z23 NEED FOR VACCINATION: ICD-10-CM

## 2023-08-01 DIAGNOSIS — M76.30 ILIOTIBIAL BAND SYNDROME, UNSPECIFIED LATERALITY: ICD-10-CM

## 2023-08-01 DIAGNOSIS — M70.62 GREATER TROCHANTERIC BURSITIS OF LEFT HIP: Primary | ICD-10-CM

## 2023-08-01 PROCEDURE — 90750 HZV VACC RECOMBINANT IM: CPT | Performed by: FAMILY MEDICINE

## 2023-08-01 PROCEDURE — 99214 OFFICE O/P EST MOD 30 MIN: CPT | Performed by: FAMILY MEDICINE

## 2023-08-01 PROCEDURE — 3078F DIAST BP <80 MM HG: CPT | Performed by: FAMILY MEDICINE

## 2023-08-01 PROCEDURE — 3008F BODY MASS INDEX DOCD: CPT | Performed by: FAMILY MEDICINE

## 2023-08-01 PROCEDURE — 3074F SYST BP LT 130 MM HG: CPT | Performed by: FAMILY MEDICINE

## 2023-08-01 PROCEDURE — 90471 IMMUNIZATION ADMIN: CPT | Performed by: FAMILY MEDICINE

## 2023-08-01 NOTE — PATIENT INSTRUCTIONS
Look up ITB or iliotibial band syndrome exercises for stretching. You may want to get a roller so you can roll over this area to help stretch it out.

## 2023-08-06 NOTE — PROGRESS NOTES
Marina Evans is a 66year old female. HPI:     CC:  Patient presents with:  Full Skin Exam: LOV 23. Pt present with hx of dysplastic nervus and melanoma. Pt present for full body exam. Pt present with spot of concern on her back that has been there for years. Pt claims this spot is itchy and bleeds when picked at. Allergies:  Patient has no known allergies.     HISTORY:    Past Medical History:   Diagnosis Date    Acute carpal tunnel syndrome of right wrist 2021    Right Endoscopic Carpal Tunnel Release     Anxiety state, unspecified     Arthritis     Asthma     Back problem     Borderline diabetes     Colon polyps     Decorative tattoo     Depression     Diabetes ([address]     Dysplastic nevus     right posterior shoulder - mild atypia    Dysplastic nevus     mid back - moderate atypia    Esophageal reflux     High cholesterol     Lentiginous compound nevus 2019    lower lateral back    Malignant melanoma of upper back ([address] 10/18/2018    Visual impairment       Past Surgical History:   Procedure Laterality Date    BIOPSY OF SKIN LESION Right 2018    melanoma upper back    BREAST BIOPSY Right     skin cyst    BREAST BIOPSY Left 2018    skin cyst    COLONOSCOPY  10/02/2015    EXCIS BARTHOLIN GLAND/CYST Left 2012    HEMORRHOIDECTOMY  2012    [address] [facility]    NEBULIZER, ULTRASONIC  10/25/2019      1993    WRIST [place] LIG Right 2021    Right Endoscopic Carpal Tunnel Release      Family History   Problem Relation Age of Onset    Hypertension Mother     Other (Other) Mother         a fib    Cancer Maternal Grandmother         ?liver    Cancer Son         malignant melanoma    Bipolar Disorder Brother     Other (Other) Brother         Diverticulitis    Blindness Brother     Other (Other) Brother         Deaf    Diabetes Neg     Glaucoma Neg     Macular degeneration Neg       Social History     Socioeconomic History    Marital status:     Number of children: 1   Occupational History    Occupation: Office work, purchasing    Occupation: Office work, telephone orders   Tobacco Use    Smoking status: Every Day     Packs/day: 1.00     Years: 40.00     Pack years: 40.00     Types: Cigarettes     Last attempt to quit: 10/10/2020     Years since quittin.8    Smokeless tobacco: Former   Vaping Use    Vaping Use: Former   Substance and Sexual Activity    Alcohol use: Yes     Alcohol/week: 0.0 standard drinks of alcohol     Comment: rarely    Drug use: No    Sexual activity: Not Currently   Other Topics Concern    Pt has a pacemaker No    Pt has a defibrillator No    Reaction to local anesthetic No    Caffeine Concern No    Right Handed Yes   Social History Narrative    Live with son    Work in office / office work        Current Outpatient Medications   Medication Sig Dispense Refill    albuterol 108 (90 Base) MCG/ACT Inhalation Aero Soln Inhale 2 puffs into the lungs 4 (four) times daily as needed for Wheezing. 25.5 g 3    cetirizine 10 MG Oral Tab Take 1 tablet (10 mg total) by mouth daily. 90 tablet 3    pantoprazole 40 MG Oral Tab EC Take 1 tablet (40 mg total) by mouth daily. 90 tablet 3    venlafaxine  MG Oral Tablet 24 Hr Take 1 tablet (225 mg total) by mouth every morning. 90 tablet 1    metFORMIN  MG Oral Tablet 24 Hr Take 2 tablets (1,000 mg total) by mouth in the morning and 2 tablets (1,000 mg total) in the evening. Take with meals. 360 tablet 3    STEGLATRO 15 MG Oral Tab tablet Take 1 tablet (15 mg total) by mouth in the morning. 90 tablet 3    atorvastatin 80 MG Oral Tab Take 1 tablet (80 mg total) by mouth nightly. 90 tablet 3    Budesonide-Formoterol Fumarate (SYMBICORT) 80-4.5 MCG/ACT Inhalation Aerosol Inhale 2 puffs into the lungs 2 (two) times daily. 10.2 g 11    CRANBERRY OR Take by mouth.       Blood Glucose Monitoring Suppl (ONETOUCH ULTRA 2) w/Device Does not apply Kit CHECK SUGARS BID AND PRN      ONETOUCH ULTRA In Vitro Strip USE BID AND PRN      OneTouch Delica Lancets 67C Does not apply Misc USE BID AND PRN      Blood Gluc Meter Disp-Strips Does not apply Device Needs 1 glucometer along with 100 test strips and 100 lancets with 11 refills each. Check sugars BID and prn. 1 Device 0    Potassium (POTASSIMIN OR) Take by mouth. Multiple Vitamins-Minerals (MULTIVITAMIN ADULT OR) Take by mouth. Coenzyme Q10 (CO Q 10 OR) Take by mouth. Misc Natural Products (GLUCOSAMINE CHOND COMPLEX/MSM OR) Take by mouth. Docusate Sodium (STOOL SOFTENER OR) Take by mouth.        Allergies:   No Known Allergies    Past Medical History:   Diagnosis Date    Acute carpal tunnel syndrome of right wrist 2021    Right Endoscopic Carpal Tunnel Release     Anxiety state, unspecified     Arthritis     Asthma     Back problem     Borderline diabetes     Colon polyps     Decorative tattoo     Depression     Diabetes ([address]     Dysplastic nevus     right posterior shoulder - mild atypia    Dysplastic nevus     mid back - moderate atypia    Esophageal reflux     High cholesterol     Lentiginous compound nevus 2019    lower lateral back    Malignant melanoma of upper back ([address] 10/18/2018    Visual impairment      Past Surgical History:   Procedure Laterality Date    BIOPSY OF SKIN LESION Right 2018    melanoma upper back    BREAST BIOPSY Right     skin cyst    BREAST BIOPSY Left     skin cyst    COLONOSCOPY  10/02/2015    EXCIS BARTHOLIN GLAND/CYST Left 2012    HEMORRHOIDECTOMY  2012    [facility]    NEBULIZER, ULTRASONIC  10/25/2019      1993    WRIST ARTHROSCOP,RELEASE XVERS LIG Right 2021    Right Endoscopic Carpal Tunnel Release     Social History    Socioeconomic History      Marital status:       Spouse name: Not on file      Number of children: 1      Years of education: Not on file      Highest education level: Not on file    Occupational History      Occupation: Office work, purchasing Occupation: Office work, telephone orders    Tobacco Use      Smoking status: Every Day        Packs/day: 1.00        Years: 40.00        Pack years: 40        Types: Cigarettes        Last attempt to quit: 10/10/2020        Years since quittin.8      Smokeless tobacco: Former    Vaping Use      Vaping Use: Former    Substance and Sexual Activity      Alcohol use:  Yes        Alcohol/week: 0.0 standard drinks of alcohol        Comment: rarely      Drug use: No      Sexual activity: Not Currently    Other Topics      Concerns:        Grew up on a farm: Not Asked        History of tanning: Not Asked        Outdoor occupation: Not Asked        Pt has a pacemaker: No        Pt has a defibrillator: No        Breast feeding: Not Asked        Reaction to local anesthetic: No         Service: Not Asked        Blood Transfusions: Not Asked        Caffeine Concern: No        Occupational Exposure: Not Asked        Hobby Hazards: Not Asked        Sleep Concern: Not Asked        Stress Concern: Not Asked        Weight Concern: Not Asked        Special Diet: Not Asked        Back Care: Not Asked        Exercise: Not Asked        Bike Helmet: Not Asked        Seat Belt: Not Asked        Self-Exams: Not Asked        Left Handed: Not Asked        Right Handed: Yes        Currently spends a great deal of time in the sun: Not Asked        Past Sunlamp Treatments for Acne: Not Asked        Hx of Spending [place] of Time in [place]: Not Asked        Bad sunburns in the past: Not Asked        Tanning Salons in the Past: Not Asked        Hx of Radiation Treatments: Not Asked        Regular use of sun block: Not Asked    Social History Narrative      Live with son      Work in office / office work    Social Determinants of Health  Financial Resource Strain: Not on file  Food Insecurity: Not on file  Transportation Needs: Not on file  Physical Activity: Not on file  Stress: Not on file  Social Connections: Not on file  Housing Stability: Not on file  Family History   Problem Relation Age of Onset    Hypertension Mother     Other (Other) Mother         a fib    Cancer Maternal Grandmother         ?liver    Cancer Son         malignant melanoma    Bipolar Disorder Brother     Other (Other) Brother         Diverticulitis    Blindness Brother     Other (Other) Brother         Deaf    Diabetes Neg     Glaucoma Neg     Macular degeneration Neg        There were no vitals filed for this visit. HPI:    Patient presents with:  Full Skin Exam: LOV 1/25/23. Pt present with hx of dysplastic nervus and melanoma. Pt present for full body exam. Pt present with spot of concern on her back that has been there for years. Pt claims this spot is itchy and bleeds when picked at. Patient here for follow-up. No particular concerns. Overall no new suspicious lesions no problematic lesions. Careful with sun exposure. Nothing else really changing   no other new complaints  Still lots of stress at home. Has been healthy. No recent illnesses. Follow-up history melanoma family history melanoma. Recent excision dysplastic nevus left mid back, biopsy right posterior shoulder atypical nevus    Has been careful with sunscreen. Had carpal tunnel surgery. Doing well. Nothing really changing as far as patient aware. Scar on upper back  at times improving. No particular changes in skin lesions noted by patient denies changing lesions no other particular lesions of concern. Past notes/ records and appropriate/relevant lab results including pathology and past body maps reviewed. Updated and new information noted in current visit. Status post excision of melanoma right posterior shoulder upper back 10/2018.  0.5 mm     fhx skin ca-nmsc father , mother and melanoma in son. Patient presents with concerns above. Patient has been in their usual state of health. History, medications, allergies reviewed as noted.       ROS:  Denies any other systemic complaints. 10 point review of systems was completed. Pertinent positives and negatives noted in the the HPI. No new or changeing lesions other than noted above. No fevers, chills, night sweats, unusual sun sensitivity. No other skin complaints. History, medications, allergies reviewed as noted. Physical Examination:     Well-developed well-nourished patient alert oriented in no acute distress. Exam total-body performed, including scalp, head, neck, face,nails, hair, external eyes, including conjunctival mucosa, eyelids, lips external ears, back, chest,/ breasts, axillae,  abdomen, arms, legs, palms. Multiple light to medium brown, well marginated, uniformly pigmented, macules and papules 6 mm and less scattered on exam. pigmented lesions examined with dermoscopy benign-appearing patterns. Waxy tannish keratotic papules scattered, cherry-red vascular papules scattered. See map today's date for lesions noted . Otherwise remarkable for lesions as noted on map. See details of examination  See Assessment /Plan for additional history and physical exam also:    Assessment / plan:    No orders of the defined types were placed in this encounter. Meds & Refills for this Visit:  Requested Prescriptions      No prescriptions requested or ordered in this encounter         Multiple nevi  (primary encounter diagnosis)  Seborrheic keratoses  History of dysplastic nevus  Personal history of malignant melanoma of skin  Benign neoplasm of skin, unspecified location  Benign neoplasm of scalp and skin of neck  Benign neoplasm of skin of face  Benign neoplasm of skin of lower limb, including hip, unspecified laterality  Benign neoplasm of skin of upper limb, including shoulder, unspecified laterality  Benign neoplasm of skin of trunk  Family history of melanoma    See details on map. Remarkable for:    Excoriations left shoulder, legs. 's nodules.   Monitor patient aware  Recheck left shoulder at follow-up    No AK's no new suspicious pigmented lesions. No significant changes in exam.  Continue careful monitoring follow-up      Pigmented lesions essentially unchanged. Continue careful follow-up monitoring every 6 months or as needed  Continue careful follow-up and monitoring    No new atypical features in nevi. No new suspicious lesions continue sun protection. Patient's not been outdoors as much recently. Right posterior shoulder2/21 lentiginous junctional dysplastic nevus mild atypia associated with seborrheic keratosis margins clear on biopsy      Left mid back atypical nevus post reexcision pathology lentiginous dysplastic compound nevus moderate architectural and mild to moderate cytologic atypia close margins excised3/21    Discomfort improving amitriptyline helps has not needed to take this as frequently for pain in the scar overall this is improved significantly    Dermal papular nodule pinkish at left medial knee, distal thigh 4 mm. Patient does pick at this. Will observe currently. Unchanged 's nodules as noted above    Multiple benign keratoses scattered few new lesions over the arms chest back    Waxy tan keratotic papules lesions in areas of concern as noted reassurance given. Benign nature discussed. Possibility of cryo, alphahydroxy acids over-the-counter retinol's discussed. Extensive lentigines keratoses reassurance  Crusted verrucoid keratotic papules scattered along the posterior scalp consistent with 's nodules. Discussed pathophysiology diagnosis. Consider possible cryo. Reassurance given lesions continue to be bothersome consider biopsy. Benign nature discussed. Avoid manipulating. No AK's    Biopsy 2019  Lentiginous compound nevus from left back reassurance no evidence recurrence    Status post excision melanoma 10/2018 0.5 mm. No adenopathy no recurrence excellent cosmetic outcome. Scar itchy intermittently. Reassurance given. Macule r flank with central darker pigment-4mm unchanged observe probable juctional nevs vs sk unchanged multiple waxy tan papules scattered over the shoulders upper back lower extremities reassurance. Extensive lentigines    Scattered waxy tan papules reassurance. Dermatofibroma right post thigh left lower leg, right lower leg` given no other new suspicious pigmented lesions. Fair skin, moderate sun damage continue careful sun protection. Regular skin    Extensive nevi generalized, diffuse lenitgenes and ephelides. No significant changes in pigmented lesions. Continue careful sun protection regular skin checks follow-up 6 months or as needed    please refer to map for specific lesions. See additional diagnoses. Pros cons of various therapies, risks benefits discussed. Pathophysiology discussed with patient. Therapeutic options reviewed. See  Medications in grid. Instructions reviewed at length. Benign nevi, seborrheic  keratoses, cherry angiomas:  Reassurance regarding other benign skin lesions. Signs and symptoms of skin cancer, ABCDE's of melanoma discussed with patient. Sunscreen use, sun protection, self exams reviewed. Followup as noted RTC routine checkup 4 months or p.r.n. This note was generated using Dragon voice recognition software. Please contact me regarding any confusion resulting from errors in recognition. Encounter Times   Including precharting, reviewing chart, prior notes obtaining history: 10 minutes, medical exam :10 minutes, notes on body map, plan, counseling 10minutes My total time spent caring for the patient on the day of the encounter: 30 minutes    Note to patient and family: The [facility] makes medical notes like these available to patients. However, be advised this is a medical document. It is intended as [id number] communication and monitoring of a patient's care needs.  It is written in medical language and may contain abbreviations or verbiage that are unfamiliar. It may appear blunt or direct. Medical documents are intended to carry relevant information, facts as evident and the clinical opinion of the practitioner.

## 2023-08-08 RX ORDER — VENLAFAXINE HYDROCHLORIDE 225 MG/1
225 TABLET, EXTENDED RELEASE ORAL EVERY MORNING
Qty: 90 TABLET | Refills: 3 | Status: SHIPPED | OUTPATIENT
Start: 2023-08-08

## 2023-08-08 NOTE — TELEPHONE ENCOUNTER
Refill passed per [facility] protocol.     Requested Prescriptions   Pending Prescriptions Disp Refills    VENLAFAXINE  MG Oral Tablet 24 Hr [Pharmacy Med Name: VENLAFAXINE HCL ER TABS 225MG] 90 tablet 3     Sig: TAKE 1 TABLET EVERY MORNING       Psychiatric Non-Scheduled (Anti-Anxiety) Passed - 8/7/2023  1:16 AM        Passed - In person appointment or virtual visit in the past 6 mos or appointment in next 3 mos     Recent Outpatient Visits              1 week ago Greater trochanteric bursitis of left hip    [facility], [address], [facility], [place]    Office Visit    1 week ago Multiple nevi    Ellen Clarke MD    Office Visit    5 months ago Trigger thumb of right hand    [address], [address], [address], [place], [place]    Office Visit    6 months ago Multiple Ellen Huffman MD    Office Visit    9 months ago Adult general medical exam    [facility], [address], [place], [place]    Office Visit          Future Appointments         Provider Department Appt Notes    In 5 months Keith Mora MD [address], [address] [facility] 6 mos follow up                   Future Appointments         Provider Department Appt Notes    In 5 months Keith Mora MD [address], [address] [facility] 6 mos follow up           Recent Outpatient Visits              1 week ago Greater trochanteric bursitis of left hip    [address], [address], [address], [place], [place]    Office Visit    1 week ago Multiple brookei    Ellen Clarke MD    Office Visit    5 months ago Trigger thumb of right hand    [address], [address], [address], [place], [place]    Office Visit    6 months ago Multiple nevi    Thersa Dear, Ellen Jamison MD    Office Visit    9 months ago Adult general medical exam    [address] [facility], [address], [place], [place]    Office Visit

## 2023-08-25 ENCOUNTER — TELEPHONE (OUTPATIENT)
Facility: CLINIC | Age: 63
End: 2023-08-25

## 2023-08-25 DIAGNOSIS — Z86.010 PERSONAL HISTORY OF COLONIC POLYPS: ICD-10-CM

## 2023-08-25 DIAGNOSIS — Z12.11 COLON CANCER SCREENING: Primary | ICD-10-CM

## 2023-08-25 NOTE — TELEPHONE ENCOUNTER
Pt called to schedule repeat colonoscopy per letter received in 2020. Does she need to be seen or just schedule? Please call.

## 2023-08-25 NOTE — TELEPHONE ENCOUNTER
Reason:history of colon polyps, crc screening  Prep:miralax prep  Medication: hold oral DM medications day before and day of,   Sedation: LIZETH Ornelas PA-C

## 2023-08-25 NOTE — TELEPHONE ENCOUNTER
Karen    Patient called to schedule 5 year recall colonoscopy with Dr. Song Pacheco that is overdue. Please provide orders if ok to schedule directly. Thank you    Last Procedure, Date, MD:  Colonoscopy Dr. Song Pacheco 10/2/2015  Last Diagnosis:  colon polyp x2, diverticulosis  Recalled (mth/yrs): 5 years  Sedation Used Previously:  IV  Last Prep Used (if known):  Miralax  Quality Of Prep (if known): good  Anticoagulants: no  Diuretics: no  Diabetic Med's (PO/Injectables): Metformin, Steglatro  Weight loss Med's: no  Iron/Herbal/Multivitamin Supplements (RX/OTC): cranberry, coQ10, multivitamin, glucosamine  Marijuana/Vaping/CBD: no  Height & Weight: 5'9\" 205 lbs 6.4 oz  BMI: 30.32  Hx of Cardiac/CVA Issues/(MI/Stroke): no  Devices Pacemaker/Defibrillator/Stents: no  Respiratory Issues/Oxygen Use/EBONY/COPD: asthma  Issues w/ Anesthesia: no    Symptoms (Y/N): denies any new symptoms she is on a stool softener (will have discomfort if she can't move her bowels). Reports history of hemorrhoids. Does not want office visit - told Rn these are old issues she is not concerned about/not bothering her    Special Comments/Notes: n/a    Please advise on orders and prep. Thank you!

## 2023-08-25 NOTE — TELEPHONE ENCOUNTER
Makayla Melvin MD   Physician  Specialty: GASTROENTEROLOGY     Operative Report  Addendum     Encounter Date: 10/2/2015     Patient:   Murray Hall. #:   [id number]                    Room: [facility]  Malini DUNNE #:  [id number]     ADMITTED:   10/02/2015        PRE-PROCEDURE DIAGNOSES  1. Colon cancer screening. 2.  Intermittent rectal bleeding and known hemorrhoidal disease. POST PROCEDURE DIAGNOSES  1. Colon polyp x2.  2.  Diverticulosis. 3.  Internal hemorrhoids. PROCEDURE: Colonoscopy     SURGEON: Lenka Lamar MD     TECHNIQUE: After informed consent was obtained and all questions   were  answered, the patient was sedated with divided doses of Versed 10   mg and  fentanyl 100 mcg. The digital rectal exam performed showed   external skin  tag, but no palpable intra-anal abnormalities. The scope was   advanced under  direct visualization to the cecum, as noted by the ileocecal valve   and  appendiceal orifice landmarks. The scope was then gradually   withdrawn. Careful examination of the mucosa was performed both on insertion   and  removal. Retroflexion was performed in the rectum. No immediate  complications. The patient tolerated the procedure well. Findings   outlined  below. ASA class was 2. Quality of the bowel preparation was chose   as  good. FINDINGS  1. Colon polyps x2. These were diminutive, approximately 4 mm or   so in  size, removed by cold snare technique. Both sites were inspected   and found  to be free of bleeding. Specimens retrieved and sent for analysis. One  polyp was removed from the proximal transverse. A second polyp was   removed  from the rectum. 2.  Diverticular disease located predominantly in the sigmoid and  descending colon. No evidence of diverticulitis. 3.  Internal hemorrhoids, moderate in size, with external skin   tags as  outlined above. RECOMMENDATIONS  1. Post polypectomy instructions given.   2.  Consideration for a repeat colonoscopy within 5 years if   adenomatous  tissue is noted. 3.  Symptomatic treatment of hemorrhoids. 4.  High-fiber diet for diverticular disease. D:    10/02/2015 8:26 A  T:    10/02/2015 10:48 A  [address] [place] [address]. Jhony Mota, [address]  [address] [place] [address]. Jhony Mota MD 1598  MD ID #:      [id number]  cc:   [facility], [address] [place] [address] Extension Jhony Mota, [address] [place] [address] NUMBER:         [id number]  KATIE ADRIENNE [facility] #: [id number]. [facility]  MR #: [id number]                  Patient: Evaristo Ferro            Last signed by: Gia Dodge MD at 10/6/2015  9:19 AM   Electronically signed by Gia Dodge MD at 10/6/2015  9:19 AM  Older Notes       Notes Recorded by Gina Goldberg RN on 10/6/2015 at 1:18 PM  See telephone encounter  ------     Notes Recorded by Gia Dodge MD on 10/6/2015 at 11:47 AM  RN to place on the colon call back for 5 years and mail letter to the pt. Thanks. PATHOLOGY-SURGICAL  Order: [id number]  Status: Final result       Visible to patient: Yes (not seen)    2 Result Notes      Component 7 yr ago   [address] [facility] [address]                              [address] [facility], [address] [facility] REPORT                                                                    [id number]                                         --------------------------------------------------------------------------------        FINAL DIAGNOSIS                      A. Colon, transverse, polyp:    *  Tubular adenoma, fragments. B. Rectum, polyp:    *  Hyperplastic polyp. TISSUES SUBMITTED                 A. Polyp, transverse colon  B. Polyp, Rectal     CLINICAL HISTORY:                 Screening, polyps, diverticulosis, hemorrhoids.      GROSS DESCRIPTION                 Specimen A is received in formalin labeled \"Khalif GRACE. Transverse polyp. \"  The  specimen consists of two soft pink-tan ovoid tissue fragments measuring 0.3 x  0.3 x 0.2 cm in aggregate with each measuring 0.3 cm in size. The entire  specimen is submitted in cassette A. Specimen B is received in formalin labeled \"Khalif MOUSTAPHA. Rectal polyp. \"  The  specimen consists of one soft pink-tan ovoid tissue fragment measuring 0.4 x 0.2  x 0.1 cm.   The entire specimen is submitted in cassette B.  ([facility])     Amy Lee M.D./nancy           Signed:   Claudia Piedra MD                10/02/15 (Electronic)  --------------------------------------------------------------------------------                                               Claudene Hind, M.D., MEDICAL DIRECTOR  [place] LICENSE#: [id number]             [address] MEDICARE#:              CAP#: [id number]   [facility] Agency [facility])              Specimen Collected: 10/02/15  8:14 AM Last Resulted: 10/02/15  4:05 PM

## 2023-08-28 NOTE — TELEPHONE ENCOUNTER
Scheduled for:  colonoscopy [place]  Provider Name:    Location:  [facility]  Sedation:  mac  Time:  7:30am (pt is aware to arrive at 6:30am    Prep:  miralax prep   Meds/Allergies Reconciled?:  yes    Diagnosis with codes:  :history of colon polyps, z86.010 crc screening z12.11  Was patient informed to call insurance with codes (Y/N):  yes     Referral sent?:  Referral was sent at the time of electronic surgical scheduling. [address] [facility] or [address] [place] [address] notified?:  I sent an electronic request to Endo Scheduling and received a confirmation today. Medication Orders:  hold oral DM medications day before and day of,   Misc Orders:  none     Further instructions given by staff:  I discussed the prep instructions with the patient which she verbally understood and is aware that I will mail the instructions today.

## 2023-10-03 DIAGNOSIS — E78.2 MIXED HYPERLIPIDEMIA: ICD-10-CM

## 2023-10-03 NOTE — TELEPHONE ENCOUNTER
Patient called requesting a refill on medication below has enough for Friday.      Medication Quantity Refills Start End   atorvastatin 80 MG Oral Tab

## 2023-10-04 RX ORDER — ATORVASTATIN CALCIUM 80 MG/1
80 TABLET, FILM COATED ORAL NIGHTLY
Qty: 90 TABLET | Refills: 3 | Status: SHIPPED | OUTPATIENT
Start: 2023-10-04

## 2023-10-04 NOTE — TELEPHONE ENCOUNTER
Refill passed per [facility] [place] protocol. Requested Prescriptions   Pending Prescriptions Disp Refills    atorvastatin 80 MG Oral Tab 90 tablet 3     Sig: Take 1 tablet (80 mg total) by mouth nightly.        Cholesterol Medication Protocol Passed - 10/3/2023 11:42 AM        Passed - ALT in past 12 months        Passed - LDL in past 12 months        Passed - Last ALT < 80     Lab Results   Component Value Date    ALT 71 (H) 10/22/2022             Passed - Last LDL < 130     Lab Results   Component Value Date    LDL 80 10/22/2022             Passed - In person appointment or virtual visit in the past 12 mos or appointment in next 3 mos     Recent Outpatient Visits              2 months ago Greater trochanteric bursitis of left hip    [facility], [place] [place],     Office Visit    2 months ago Asad Tabor MD    Office Visit    7 months ago Trigger thumb of right hand    [address], [address], [address], [place]    Office Visit    8 months ago Ellen Tabor MD    Office Visit    11 months ago Adult general medical exam    Anastasia Beard, [address], [place],     Office Visit          Future Appointments         Provider Department Appt Notes    In 3 months Francesca Narvaez, Luis Armando Andres year px policy informed    In 3 months Марина Ortiz MD [address], [facility] 6 mos follow up     In 4 months [facility], [address] [place] [address] 20, [facility] w/mac @[facility]                       Recent Outpatient Visits              2 months ago Greater trochanteric bursitis of left hip    [address], [address], [address], [place], [place]    Office Visit    2 months ago Multiple nevi    [address], [address] Ellen Dorman MD    Office Visit    7 months ago Trigger thumb of right hand    [address] [facility], [address], [place], DO    Office Visit    8 months ago Multiple nevi    Rohan Esposito, Ellen Mora MD    Office Visit    11 months ago Adult general medical exam    [address] [facility], [address], [place], DO    Office Visit          Future Appointments         Provider Department Appt Notes    [facility] [address] [facility], Luis Armando year px policy informed    In 3 months Keith Mora MD [address], [place] 6 mos follow up     In 4 months [address], [address] [place] [address] Rd,[address], [address] colon w/mac @[facility]

## 2023-11-10 DIAGNOSIS — E11.9 TYPE 2 DIABETES MELLITUS WITHOUT RETINOPATHY (HCC): ICD-10-CM

## 2023-11-10 DIAGNOSIS — R80.9 CONTROLLED TYPE 2 DIABETES MELLITUS WITH MICROALBUMINURIA, WITHOUT LONG-TERM CURRENT USE OF INSULIN: ICD-10-CM

## 2023-11-10 DIAGNOSIS — E11.29 CONTROLLED TYPE 2 DIABETES MELLITUS WITH MICROALBUMINURIA, WITHOUT LONG-TERM CURRENT USE OF INSULIN: ICD-10-CM

## 2023-11-10 RX ORDER — ERTUGLIFLOZIN 15 MG/1
15 TABLET, FILM COATED ORAL EVERY MORNING
Qty: 90 TABLET | Refills: 3 | Status: SHIPPED | OUTPATIENT
Start: 2023-11-10

## 2023-11-10 NOTE — TELEPHONE ENCOUNTER
Please review; protocol failed.  Or has no protocol    Requested Prescriptions   Pending Prescriptions Disp Refills    STEGLATRO 15 MG Oral Tab tablet [Pharmacy Med Name: Amanda Salazar 15MG] 90 tablet 3     Sig: TAKE 1 TABLET IN THE MORNING       Diabetes Medication Protocol Failed - 11/10/2023 12:02 AM        Failed - Last A1C < 7.5 and within past 6 months     Lab Results   Component Value Date    A1C 6.4 (H) 10/22/2022             Failed - EGFRCR or GFRNAA > 50     GFR Evaluation            Failed - GFR in the past 12 months        Passed - In person appointment or virtual visit in the past 6 mos or appointment in next 3 mos     Recent Outpatient Visits              3 months ago Greater trochanteric bursitis of left hip    [facility], Luis Armando [place], DO    Office Visit    3 months ago Multiple nevi    [facility] MD Asad    Office Visit    8 months ago Trigger thumb of right hand    [address], [address], [address], [place], DO    Office Visit    9 months ago Multiple nevi    [facility] [facility] Kolby Jacobs MD    Office Visit    1 year ago Adult general medical exam    [address], [address], [place], DO    Office Visit          Future Appointments         Provider Department Appt Notes    In 1 month Jax Cat, [address] Luis Arm[address] Koch year px policy informed    In 2 months Kolby Jacobs MD [address], [facility] 6 mos follow up     In 3 months [facility], [address] [place] [address] 20, 59 [facility] w/mac @[facility]                     Recent Outpatient Visits              3 months ago Greater trochanteric bursitis of left hip    [address], [address], [address], [place], DO    Office Visit    3 months ago [facility]    Office Visit    8 months ago Trigger thumb of right hand    Luis Armando Nguyen DO    Office Visit    9 months ago Multiple nevi    Ellen Gillis MD    Office Visit    1 year ago Adult general medical exam    Shereen Larkin, [address], [place],     Office Visit           Future Appointments         Provider Department Appt Notes    In 1 month [facility], [address], Luis Armando year px policy informed    In 2 months MD Eliazar Pinto [place] 6 mos follow up     In 3 months [address] [facility], [address] [place] [address],[address], [facility] kasi/mac @[facility]

## 2023-11-16 ENCOUNTER — HOSPITAL ENCOUNTER (EMERGENCY)
Facility: HOSPITAL | Age: 63
Discharge: HOME | End: 2023-11-16
Attending: EMERGENCY MEDICINE
Payer: COMMERCIAL

## 2023-11-16 ENCOUNTER — APPOINTMENT (OUTPATIENT)
Dept: MRI IMAGING | Facility: HOSPITAL | Age: 63
End: 2023-11-16
Attending: EMERGENCY MEDICINE
Payer: COMMERCIAL

## 2023-11-16 VITALS
HEART RATE: 65 BPM | TEMPERATURE: 99 F | OXYGEN SATURATION: 98 % | BODY MASS INDEX: 30 KG/M2 | WEIGHT: 205 LBS | DIASTOLIC BLOOD PRESSURE: 69 MMHG | SYSTOLIC BLOOD PRESSURE: 123 MMHG | RESPIRATION RATE: 16 BRPM

## 2023-11-16 DIAGNOSIS — M54.41 ACUTE LOW BACK PAIN WITH BILATERAL SCIATICA, UNSPECIFIED BACK PAIN LATERALITY: Primary | ICD-10-CM

## 2023-11-16 DIAGNOSIS — M54.42 ACUTE LOW BACK PAIN WITH BILATERAL SCIATICA, UNSPECIFIED BACK PAIN LATERALITY: Primary | ICD-10-CM

## 2023-11-16 LAB
ANION GAP SERPL CALC-SCNC: 8 MMOL/L (ref 0–18)
BASOPHILS # BLD AUTO: 0.01 X10(3) UL (ref 0–0.2)
BASOPHILS NFR BLD AUTO: 0.1 %
BUN BLD-MCNC: 14 MG/DL (ref 9–23)
BUN/CREAT SERPL: 11.7 (ref 10–20)
CALCIUM BLD-MCNC: 9.4 MG/DL (ref 8.7–10.4)
CHLORIDE SERPL-SCNC: 107 MMOL/L (ref 98–112)
CO2 SERPL-SCNC: 23 MMOL/L (ref 21–32)
CREAT BLD-MCNC: 1.2 MG/DL
DEPRECATED RDW RBC AUTO: 48 FL (ref 35.1–46.3)
EGFRCR SERPLBLD CKD-EPI 2021: 52 ML/MIN/1.73M2 (ref 60–?)
EOSINOPHIL # BLD AUTO: 0 X10(3) UL (ref 0–0.7)
EOSINOPHIL NFR BLD AUTO: 0 %
ERYTHROCYTE [DISTWIDTH] IN BLOOD BY AUTOMATED COUNT: 13.5 % (ref 11–15)
GLUCOSE BLD-MCNC: 208 MG/DL (ref 70–99)
HCT VFR BLD AUTO: 44.2 %
HGB BLD-MCNC: 15.1 G/DL
IMM GRANULOCYTES # BLD AUTO: 0.04 X10(3) UL (ref 0–1)
IMM GRANULOCYTES NFR BLD: 0.4 %
LYMPHOCYTES # BLD AUTO: 3.45 X10(3) UL (ref 1–4)
LYMPHOCYTES NFR BLD AUTO: 31.7 %
MAGNESIUM SERPL-MCNC: 2 MG/DL (ref 1.6–2.6)
MCH RBC QN AUTO: 32.8 PG (ref 26–34)
MCHC RBC AUTO-ENTMCNC: 34.2 G/DL (ref 31–37)
MCV RBC AUTO: 96.1 FL
MONOCYTES # BLD AUTO: 0.66 X10(3) UL (ref 0.1–1)
MONOCYTES NFR BLD AUTO: 6.1 %
NEUTROPHILS # BLD AUTO: 6.73 X10 (3) UL (ref 1.5–7.7)
NEUTROPHILS # BLD AUTO: 6.73 X10(3) UL (ref 1.5–7.7)
NEUTROPHILS NFR BLD AUTO: 61.7 %
OSMOLALITY SERPL CALC.SUM OF ELEC: 293 MOSM/KG (ref 275–295)
PLATELET # BLD AUTO: 221 10(3)UL (ref 150–450)
POTASSIUM SERPL-SCNC: 4.3 MMOL/L (ref 3.5–5.1)
RBC # BLD AUTO: 4.6 X10(6)UL
SODIUM SERPL-SCNC: 138 MMOL/L (ref 136–145)
WBC # BLD AUTO: 10.9 X10(3) UL (ref 4–11)

## 2023-11-16 PROCEDURE — 96375 TX/PRO/DX INJ NEW DRUG ADDON: CPT

## 2023-11-16 PROCEDURE — 72148 MRI LUMBAR SPINE W/O DYE: CPT | Performed by: EMERGENCY MEDICINE

## 2023-11-16 PROCEDURE — 96376 TX/PRO/DX INJ SAME DRUG ADON: CPT

## 2023-11-16 PROCEDURE — 99285 EMERGENCY DEPT VISIT HI MDM: CPT

## 2023-11-16 PROCEDURE — 96374 THER/PROPH/DIAG INJ IV PUSH: CPT

## 2023-11-16 PROCEDURE — 99284 EMERGENCY DEPT VISIT MOD MDM: CPT

## 2023-11-16 PROCEDURE — 83735 ASSAY OF MAGNESIUM: CPT | Performed by: EMERGENCY MEDICINE

## 2023-11-16 PROCEDURE — 85025 COMPLETE CBC W/AUTO DIFF WBC: CPT | Performed by: EMERGENCY MEDICINE

## 2023-11-16 PROCEDURE — 80048 BASIC METABOLIC PNL TOTAL CA: CPT | Performed by: EMERGENCY MEDICINE

## 2023-11-16 RX ORDER — DIAZEPAM 5 MG/ML
2.5 INJECTION, SOLUTION INTRAMUSCULAR; INTRAVENOUS ONCE
Status: COMPLETED | OUTPATIENT
Start: 2023-11-16 | End: 2023-11-16

## 2023-11-16 RX ORDER — CYCLOBENZAPRINE HCL 10 MG
10 TABLET ORAL 3 TIMES DAILY PRN
Qty: 10 TABLET | Refills: 0 | Status: SHIPPED | OUTPATIENT
Start: 2023-11-16 | End: 2023-11-19

## 2023-11-16 RX ORDER — LIDOCAINE 50 MG/G
1 PATCH TOPICAL EVERY 24 HOURS
Qty: 6 PATCH | Refills: 0 | Status: SHIPPED | OUTPATIENT
Start: 2023-11-16 | End: 2023-11-20

## 2023-11-16 RX ORDER — HYDROCODONE BITARTRATE AND ACETAMINOPHEN 5; 325 MG/1; MG/1
1 TABLET ORAL EVERY 6 HOURS PRN
Qty: 10 TABLET | Refills: 0 | Status: SHIPPED | OUTPATIENT
Start: 2023-11-16

## 2023-11-16 RX ORDER — METHYLPREDNISOLONE 4 MG/1
TABLET ORAL
Qty: 1 EACH | Refills: 0 | Status: SHIPPED | OUTPATIENT
Start: 2023-11-16

## 2023-11-16 RX ORDER — MORPHINE SULFATE 4 MG/ML
4 INJECTION, SOLUTION INTRAMUSCULAR; INTRAVENOUS ONCE
Status: COMPLETED | OUTPATIENT
Start: 2023-11-16 | End: 2023-11-16

## 2023-11-16 NOTE — ED INITIAL ASSESSMENT (HPI)
Pt states she went from sitting to standing on a tall toilet, when she stood up she felt a twinge in her back. Pt describes the pain as shooting and to her lower back. Pt c/o numbness and tingling in italo legs. No hx of back surgeries.

## 2023-11-16 NOTE — ED QUICK NOTES
Patient reports taking \"4 Advil and a Tylenol\" after experiencing lower back pain, described as sharp an stabbing.   Denies incontinence

## 2023-11-17 NOTE — DISCHARGE INSTRUCTIONS
Thank you for seeking care at [facility] Emergency Department. You have been seen and evaluated for back pain. We discussed the results of your workup   Please read the instructions provided   If given prescriptions, take as instructed    Remember, your care process does not end after your visit today. Please follow-up with your doctor within 1-2 days for a follow-up check to ensure you are  improving, to see if you need any further evaluation/testing, or to evaluate for any alternate diagnoses. Please return to the emergency department if you develop severe pain that is not controlled by pain medications, loss of control of your legs, if you are unable to walk because of pain or weakness, worsening numbness or weakness, loss of bowel or bladder control (dribbling of urine or having accidents you wouldn't normally have), inability to urinate, numbness of your genital or anal area, fevers, abdominal pain, change in urination, or as these could all be signs of a serious medical emergency. Call or return to the ER for any other new or worsening symptoms.

## 2023-11-17 NOTE — ED QUICK NOTES
Pt able to ambulate with a walker. She was moving slow and with some discomfort; tolerated fairly. Pt expressing to be discharged home.

## 2023-11-17 NOTE — ED QUICK NOTES
Pt ambulating with walker. Family at bedside. DC paperwork reviewed with pt; all questions answered. Pt stable at DC.

## 2023-11-20 ENCOUNTER — TELEPHONE (OUTPATIENT)
Dept: FAMILY MEDICINE CLINIC | Facility: CLINIC | Age: 63
End: 2023-11-20

## 2023-11-20 ENCOUNTER — OFFICE VISIT (OUTPATIENT)
Dept: FAMILY MEDICINE CLINIC | Facility: CLINIC | Age: 63
End: 2023-11-20

## 2023-11-20 VITALS
BODY MASS INDEX: 30.21 KG/M2 | HEART RATE: 81 BPM | SYSTOLIC BLOOD PRESSURE: 125 MMHG | HEIGHT: 69 IN | WEIGHT: 204 LBS | DIASTOLIC BLOOD PRESSURE: 67 MMHG

## 2023-11-20 DIAGNOSIS — M54.31 BILATERAL SCIATICA: ICD-10-CM

## 2023-11-20 DIAGNOSIS — M54.32 BILATERAL SCIATICA: Primary | ICD-10-CM

## 2023-11-20 DIAGNOSIS — S39.012A STRAIN OF LUMBAR REGION, INITIAL ENCOUNTER: ICD-10-CM

## 2023-11-20 DIAGNOSIS — M54.31 BILATERAL SCIATICA: Primary | ICD-10-CM

## 2023-11-20 DIAGNOSIS — M54.32 BILATERAL SCIATICA: ICD-10-CM

## 2023-11-20 RX ORDER — CYCLOBENZAPRINE HCL 10 MG
10 TABLET ORAL 3 TIMES DAILY
Qty: 30 TABLET | Refills: 0 | Status: SHIPPED | OUTPATIENT
Start: 2023-11-20 | End: 2023-11-30

## 2023-11-20 RX ORDER — HYDROCODONE BITARTRATE AND ACETAMINOPHEN 7.5; 325 MG/1; MG/1
1 TABLET ORAL EVERY 8 HOURS PRN
Qty: 30 TABLET | Refills: 0 | Status: SHIPPED | OUTPATIENT
Start: 2023-11-20

## 2023-11-20 NOTE — TELEPHONE ENCOUNTER
Spoke with pt,  verified. Pt was informed of below recommendation, pt stated ortho Nurse reached put to her, appt made. Pt is grateful for our services. FYI.           Future Appointments   Date Time Provider Ken Medeirosi   2023 11:00 AM Oksana Donovan MD THE [facility]   2024  1:00 PM Carvel Osler, DO ECADO EC ADO   2024 10:30 AM Lori Ambrose MD [facility]   2024  7:30 AM MONIKA, PROCEDURE ECCFHGIPROC None

## 2023-11-20 NOTE — TELEPHONE ENCOUNTER
Spoke with Carrillo Melvin RN at [facility],   verified. She was informed pt was seen by PCP today for some back pain. She stated per Dr Naheed Parson protocol, pt with back/ spine issue needs to be seen by pain clinic first before seeing Ortho. Carrillo Melvin stated Ortho has first available on Dec 26, she will reach out to pt and see if she is available to see Ortho on  w/o seeing pain clinic. Disha MONSALVE    Future Appointments   Date Time Provider Ken Desai   2024  1:00 PM Andrea Armenta DO ECADOFM EC ADO   2024 10:30 AM Masood Moscoso MD [facility]   2024  7:30 AM WALT FRANK ECCFHGIPROC None

## 2023-11-20 NOTE — TELEPHONE ENCOUNTER
Patient was told by her orthopaedic office she needs to see a pain management doctor before they will see her.    She is now looking for a pain management referral.

## 2023-11-21 DIAGNOSIS — E11.29 CONTROLLED TYPE 2 DIABETES MELLITUS WITH MICROALBUMINURIA, WITHOUT LONG-TERM CURRENT USE OF INSULIN (HCC): ICD-10-CM

## 2023-11-21 DIAGNOSIS — R80.9 CONTROLLED TYPE 2 DIABETES MELLITUS WITH MICROALBUMINURIA, WITHOUT LONG-TERM CURRENT USE OF INSULIN (HCC): ICD-10-CM

## 2023-11-21 DIAGNOSIS — E11.9 TYPE 2 DIABETES MELLITUS WITHOUT RETINOPATHY (HCC): ICD-10-CM

## 2023-11-22 RX ORDER — METFORMIN HYDROCHLORIDE 500 MG/1
TABLET, EXTENDED RELEASE ORAL
Qty: 360 TABLET | Refills: 0 | Status: SHIPPED | OUTPATIENT
Start: 2023-11-22 | End: 2024-02-20

## 2023-11-22 NOTE — TELEPHONE ENCOUNTER
Please review.  Protocol failed / No protocol.    Requested Prescriptions   Pending Prescriptions Disp Refills    METFORMIN  MG Oral Tablet 24 Hr [Pharmacy Med Name: METFORMIN HCL ER TABS 500MG] 360 tablet 3     Sig: TAKE 2 TABLETS IN THE MORNING AND TAKE 2 TABLETS IN THE EVENING, TAKE WITH MEALS       Diabetes Medication Protocol Failed - 11/21/2023 12:15 AM        Failed - Last A1C < 7.5 and within past 6 months     Lab Results   Component Value Date    A1C 6.4 (H) 10/22/2022             Passed - In person appointment or virtual visit in the past 6 mos or appointment in next 3 mos     Recent Outpatient Visits              2 days ago Bilateral sciatica    [facility] German Alcala MD    Office Visit    3 months ago Greater trochanteric bursitis of left hip    [facility] Juanito Dias DO    Office Visit    3 months ago Multiple nevi    [facility] Gladys Hurd MD    Office Visit    9 months ago Trigger thumb of right hand    [facility] Juanito Dias DO    Office Visit    10 months ago Multiple nevi    [facility] Gladys Hurd MD    Office Visit          Future Appointments         Provider Department Appt Notes    In 1 month Abram Moore MD [facility] back pain    In 1 month Juanito Dias DO [facility] year px policy informed    In 2 months Gladys Hurd MD [facility] 6 mos follow up     In 3 months WALT FRANK [facility] colon w/mac @[facility]               Passed - EGFRCR or GFRNAA > 50     GFR Evaluation  EGFRCR: 52 , resulted on 11/16/2023          Passed - GFR in the past 12 months             Future  Appointments         Provider Department Appt Notes    In 1 month Abram Moore MD [facility] back pain    In 1 month Juanito Dias DO [facility] year px policy informed    In 2 months Gladys Hurd MD [facility] 6 mos follow up     In 3 months FRANK, PROCEDURE [facility] colon w/mac @[facility]            Recent Outpatient Visits              2 days ago Bilateral sciatica    [facility] German Alcala MD    Office Visit    3 months ago Greater trochanteric bursitis of left hip    [facility] Juanito Dias,     Office Visit    3 months ago Multiple nevi    [facility] Gladys Young MD    Office Visit    9 months ago Trigger thumb of right hand    [facility] Juanito Dias,     Office Visit    10 months ago Multiple nevi    [facility] Gladys Hurd MD    Office Visit

## 2023-12-21 ENCOUNTER — TELEPHONE (OUTPATIENT)
Facility: CLINIC | Age: 63
End: 2023-12-21

## 2023-12-21 NOTE — TELEPHONE ENCOUNTER
Patient calling to reschedule procedure is going to be out of town or if there is anything sooner. Please call.

## 2023-12-26 ENCOUNTER — HOSPITAL ENCOUNTER (OUTPATIENT)
Dept: GENERAL RADIOLOGY | Facility: HOSPITAL | Age: 63
Discharge: HOME | End: 2023-12-26
Attending: ORTHOPAEDIC SURGERY
Payer: COMMERCIAL

## 2023-12-26 ENCOUNTER — OFFICE VISIT (OUTPATIENT)
Dept: ORTHOPEDICS CLINIC | Facility: CLINIC | Age: 63
End: 2023-12-26

## 2023-12-26 ENCOUNTER — TELEPHONE (OUTPATIENT)
Dept: PHYSICAL THERAPY | Facility: HOSPITAL | Age: 63
End: 2023-12-26

## 2023-12-26 VITALS — WEIGHT: 204 LBS | HEIGHT: 69 IN | BODY MASS INDEX: 30.21 KG/M2

## 2023-12-26 DIAGNOSIS — M54.9 BACK PAIN, UNSPECIFIED BACK LOCATION, UNSPECIFIED BACK PAIN LATERALITY, UNSPECIFIED CHRONICITY: ICD-10-CM

## 2023-12-26 DIAGNOSIS — E66.09 CLASS 1 OBESITY DUE TO EXCESS CALORIES WITH SERIOUS COMORBIDITY AND BODY MASS INDEX (BMI) OF 30.0 TO 30.9 IN ADULT: ICD-10-CM

## 2023-12-26 DIAGNOSIS — M47.816 LUMBAR SPONDYLOSIS: Primary | ICD-10-CM

## 2023-12-26 PROCEDURE — 3008F BODY MASS INDEX DOCD: CPT | Performed by: ORTHOPAEDIC SURGERY

## 2023-12-26 PROCEDURE — 72110 X-RAY EXAM L-2 SPINE 4/>VWS: CPT | Performed by: ORTHOPAEDIC SURGERY

## 2023-12-26 PROCEDURE — 99204 OFFICE O/P NEW MOD 45 MIN: CPT | Performed by: ORTHOPAEDIC SURGERY

## 2023-12-28 ENCOUNTER — TELEPHONE (OUTPATIENT)
Dept: FAMILY MEDICINE CLINIC | Facility: CLINIC | Age: 63
End: 2023-12-28

## 2023-12-28 DIAGNOSIS — Z00.00 ADULT GENERAL MEDICAL EXAM: Primary | ICD-10-CM

## 2023-12-28 DIAGNOSIS — E11.29 CONTROLLED TYPE 2 DIABETES MELLITUS WITH MICROALBUMINURIA, WITHOUT LONG-TERM CURRENT USE OF INSULIN  (HCC): ICD-10-CM

## 2023-12-28 DIAGNOSIS — R80.9 CONTROLLED TYPE 2 DIABETES MELLITUS WITH MICROALBUMINURIA, WITHOUT LONG-TERM CURRENT USE OF INSULIN  (HCC): ICD-10-CM

## 2024-01-02 ENCOUNTER — OFFICE VISIT (OUTPATIENT)
Dept: FAMILY MEDICINE CLINIC | Facility: CLINIC | Age: 64
End: 2024-01-02

## 2024-01-02 VITALS
SYSTOLIC BLOOD PRESSURE: 108 MMHG | HEART RATE: 88 BPM | HEIGHT: 69 IN | WEIGHT: 205 LBS | RESPIRATION RATE: 17 BRPM | DIASTOLIC BLOOD PRESSURE: 72 MMHG | BODY MASS INDEX: 30.36 KG/M2 | TEMPERATURE: 97 F

## 2024-01-02 DIAGNOSIS — H25.13 AGE-RELATED NUCLEAR CATARACT OF BOTH EYES: ICD-10-CM

## 2024-01-02 DIAGNOSIS — Z12.31 VISIT FOR SCREENING MAMMOGRAM: ICD-10-CM

## 2024-01-02 DIAGNOSIS — E11.29 CONTROLLED TYPE 2 DIABETES MELLITUS WITH MICROALBUMINURIA, WITHOUT LONG-TERM CURRENT USE OF INSULIN  (HCC): ICD-10-CM

## 2024-01-02 DIAGNOSIS — Z12.11 SCREENING FOR COLON CANCER: ICD-10-CM

## 2024-01-02 DIAGNOSIS — Z12.4 CERVICAL CANCER SCREENING: ICD-10-CM

## 2024-01-02 DIAGNOSIS — Z23 NEED FOR VACCINATION: ICD-10-CM

## 2024-01-02 DIAGNOSIS — F17.200 TOBACCO USE DISORDER: ICD-10-CM

## 2024-01-02 DIAGNOSIS — R91.1 PULMONARY NODULE: ICD-10-CM

## 2024-01-02 DIAGNOSIS — J45.21 MILD INTERMITTENT ASTHMA WITH ACUTE EXACERBATION: ICD-10-CM

## 2024-01-02 DIAGNOSIS — M65.331 TRIGGER MIDDLE FINGER OF RIGHT HAND: Chronic | ICD-10-CM

## 2024-01-02 DIAGNOSIS — J44.9 CHRONIC OBSTRUCTIVE PULMONARY DISEASE, UNSPECIFIED COPD TYPE (HCC): ICD-10-CM

## 2024-01-02 DIAGNOSIS — R80.9 CONTROLLED TYPE 2 DIABETES MELLITUS WITH MICROALBUMINURIA, WITHOUT LONG-TERM CURRENT USE OF INSULIN  (HCC): ICD-10-CM

## 2024-01-02 DIAGNOSIS — Z00.00 ADULT GENERAL MEDICAL EXAM: Primary | ICD-10-CM

## 2024-01-02 PROCEDURE — 90472 IMMUNIZATION ADMIN EACH ADD: CPT | Performed by: FAMILY MEDICINE

## 2024-01-02 PROCEDURE — 3008F BODY MASS INDEX DOCD: CPT | Performed by: FAMILY MEDICINE

## 2024-01-02 PROCEDURE — 3078F DIAST BP <80 MM HG: CPT | Performed by: FAMILY MEDICINE

## 2024-01-02 PROCEDURE — 99396 PREV VISIT EST AGE 40-64: CPT | Performed by: FAMILY MEDICINE

## 2024-01-02 PROCEDURE — 99213 OFFICE O/P EST LOW 20 MIN: CPT | Performed by: FAMILY MEDICINE

## 2024-01-02 PROCEDURE — 90750 HZV VACC RECOMBINANT IM: CPT | Performed by: FAMILY MEDICINE

## 2024-01-02 PROCEDURE — 90677 PCV20 VACCINE IM: CPT | Performed by: FAMILY MEDICINE

## 2024-01-02 PROCEDURE — 90471 IMMUNIZATION ADMIN: CPT | Performed by: FAMILY MEDICINE

## 2024-01-02 PROCEDURE — 3074F SYST BP LT 130 MM HG: CPT | Performed by: FAMILY MEDICINE

## 2024-01-02 RX ORDER — FLUTICASONE PROPIONATE AND SALMETEROL 250; 50 UG/1; UG/1
1 POWDER RESPIRATORY (INHALATION) EVERY 12 HOURS SCHEDULED
Qty: 3 EACH | Refills: 2 | Status: SHIPPED | OUTPATIENT
Start: 2024-01-02

## 2024-01-02 NOTE — PROGRESS NOTES
Patient ID: Harini Cuadra is a 63 year old female.    HPI  Chief Complaint   Patient presents with    Physical     There has many things that need to get done.  She has not seen gynecology, had a mammogram in some time.  She does have a colonoscopy set up for next April with Dr. Cannon.    She continues to smoke about 1 pack/day.  She does have diabetes and does not feel numbness or tingling in her feet.  There is no chest pain.  She does not wheeze quite a bit and does have COPD but is long overdue to see pulmonary.  She also did not follow-up to get the CAT scan of the chest due to some nodules in the lungs.  She has not had her long-acting inhaler and is instead been using the albuterol.  She used to be on Symbicort as well. Patient denies any chest pain, shortness breath, diaphoresis, dizziness, hypoglycemia, numbness or tingling in the legs.      She states for months now her right middle finger locks.  She has been wearing a brace at bedtime to keep it straight but it has not helped.      Health Maintenance   Topic Date Due    Lung Cancer Screening  Never done    Mammogram  03/26/2019    Colorectal Cancer Screening  10/02/2020    Pap Smear  03/19/2021    Diabetes Care Dilated Eye Exam  10/08/2021    Pneumococcal Vaccine: Birth to 64yrs (2 - PCV) 04/22/2022    Diabetes Care: Microalb/Creat Ratio  02/19/2023    Diabetes Care A1C  04/22/2023    COVID-19 Vaccine (5 - 2023-24 season) 09/01/2023    Zoster Vaccines (2 of 2) 09/26/2023    Influenza Vaccine (1) 10/01/2023    Annual Physical  10/28/2023    Annual Depression Screening  01/01/2024    Diabetes Care Foot Exam (Annual)  Never done    Diabetes Care: GFR  11/16/2024    Tobacco Cessation Counseling 1 Year  11/20/2024    DTaP,Tdap,and Td Vaccines (2 - Td or Tdap) 03/01/2029       =======================================================    Lab Results   Component Value Date    WBC 10.9 11/16/2023    RBC 4.60 11/16/2023    HGB 15.1 11/16/2023    HCT 44.2  11/16/2023    .0 11/16/2023    MPV 9.7 02/23/2018    MCV 96.1 11/16/2023    MCH 32.8 11/16/2023    MCHC 34.2 11/16/2023    RDW 13.5 11/16/2023    NEPRELIM 6.73 11/16/2023    NEUT 8.7 (H) 09/06/2016    LYMPH 3.3 09/06/2016    MON 0.6 09/06/2016    EOS 0.3 09/06/2016    BASO 0.1 09/06/2016    NEPERCENT 61.7 11/16/2023    LYPERCENT 31.7 11/16/2023    MOPERCENT 6.1 11/16/2023    EOPERCENT 0.0 11/16/2023    BAPERCENT 0.1 11/16/2023    NE 6.73 11/16/2023    LYMABS 3.45 11/16/2023    MOABSO 0.66 11/16/2023    EOABSO 0.00 11/16/2023    BAABSO 0.01 11/16/2023       Lab Results   Component Value Date     (H) 11/16/2023    BUN 14 11/16/2023    BUNCREA 11.7 11/16/2023    CREATSERUM 1.20 (H) 11/16/2023    ANIONGAP 8 11/16/2023    GFRNAA 60 02/19/2022    GFRAA 69 02/19/2022    CA 9.4 11/16/2023    OSMOCALC 293 11/16/2023    ALKPHO 67 04/17/2021    AST 75 (H) 10/22/2022    ALT 71 (H) 10/22/2022    ALKPHOS 67 09/06/2016    BILT 0.3 04/17/2021    TP 7.1 04/17/2021    ALB 3.7 04/17/2021    GLOBULIN 3.4 04/17/2021    AGRATIO 1.2 09/06/2016     11/16/2023    K 4.3 11/16/2023     11/16/2023    CO2 23.0 11/16/2023       Lab Results   Component Value Date     (H) 11/16/2023    BUN 14 11/16/2023    CREATSERUM 1.20 (H) 11/16/2023    BUNCREA 11.7 11/16/2023    ANIONGAP 8 11/16/2023    GFRAA 69 02/19/2022    GFRNAA 60 02/19/2022    CA 9.4 11/16/2023     11/16/2023    K 4.3 11/16/2023     11/16/2023    CO2 23.0 11/16/2023    OSMOCALC 293 11/16/2023       Lab Results   Component Value Date    COLORUR Yellow 07/28/2015    CLARITY Clear 07/28/2015    SPECGRAVITY 1.020 06/27/2022    GLUUR Negative 07/28/2015    BILUR Negative 07/28/2015    KETUR Negative 07/28/2015    BLOODURINE Moderate (A) 07/28/2015    PHURINE 5.5 06/27/2022    PROUR Negative 07/28/2015    UROBILINOGEN <2.0 07/28/2015    NITRITE Negative 06/27/2022    LEUUR Negative 07/28/2015    ASCACIDURINE Negative 07/28/2015    WBCUR <1  07/28/2015    RBCUR 4 (H) 07/28/2015    EPIUR Few 07/28/2015    BACUR Few (A) 07/28/2015    MICCOM Completed 07/28/2015     Lab Results   Component Value Date     (H) 10/22/2022    A1C 6.4 (H) 10/22/2022    A1C 6.7 (H) 02/19/2022    A1C 6.5 (H) 04/17/2021       Lab Results   Component Value Date    MALBP 15.40 02/19/2022    CREUR 118.00 02/19/2022       Lab Results   Component Value Date    CHOLEST 158 10/22/2022    TRIG 188 (H) 10/22/2022    HDL 46 10/22/2022    LDL 80 10/22/2022    VLDL 30 10/22/2022    NONHDLC 112 10/22/2022    CALCNONHDL 152 (H) 09/06/2016    CALCNONHDL 130 (H) 07/28/2015    CALCNONHDL 108 09/12/2014     TSH (mIU/mL)   Date Value   10/22/2022 1.810     TSH (S) (uIU/mL)   Date Value   09/06/2016 1.84       No results found for: \"B12\", \"VITB12\"    No results found for: \"IRON\", \"IRONTOT\"    No results found for: \"SAT\"    No results found for: \"IRON\", \"IRONTOT\", \"TIBC\", \"IRONSAT\", \"TRANSFERRIN\", \"TIBCP\", \"IRONBIND\", \"SAT\", \"SATUR\"    No results found for: \"DAVID\"    No results found for: \"VITD\", \"QVITD\", \"[id number]\"  No results found for: \"PSA\", \"QPSA\", \"TOTPSASCREEN\"    =======================================================    Wt Readings from Last 6 Encounters:   01/02/24 205 lb (93 kg)   12/26/23 204 lb (92.5 kg)   11/20/23 204 lb (92.5 kg)   11/16/23 205 lb 0.4 oz (93 kg)   08/01/23 205 lb 6.4 oz (93.2 kg)   02/14/23 210 lb (95.3 kg)               BMI Readings from Last 6 Encounters:   01/02/24 30.27 kg/m²   12/26/23 30.13 kg/m²   11/20/23 30.13 kg/m²   11/16/23 30.28 kg/m²   08/01/23 30.33 kg/m²   02/14/23 31.01 kg/m²       BP Readings from Last 6 Encounters:   01/02/24 108/72   11/20/23 125/67   11/16/23 123/69   08/01/23 128/77   07/27/23 149/74   02/14/23 114/79         Review of Systems      Past Medical History:   Diagnosis Date    Acute carpal tunnel syndrome of right wrist 07/26/2021    Right Endoscopic Carpal Tunnel Release     Anxiety state, unspecified     Arthritis     Asthma      Back problem     Borderline diabetes     Colon polyps     Decorative tattoo     Depression     Diabetes (HCC)     Dysplastic nevus     right posterior shoulder - mild atypia    Dysplastic nevus     mid back - moderate atypia    Esophageal reflux     High cholesterol     Lentiginous compound nevus 2019    lower lateral back    Malignant melanoma of upper back (HCC) 10/18/2018    Visual impairment        Past Surgical History:   Procedure Laterality Date    BIOPSY OF SKIN LESION Right 2018    melanoma upper back    BREAST BIOPSY Right     skin cyst    BREAST BIOPSY Left     skin cyst    COLONOSCOPY  10/02/2015    EXCIS BARTHOLIN GLAND/CYST Left 2012    HEMORRHOIDECTOMY      PPH    NEBULIZER, ULTRASONIC  10/25/2019      1993    WRIST ARTHROSCOP,RELEASE XVERS LIG Right 2021    Right Endoscopic Carpal Tunnel Release       Social History     Socioeconomic History    Marital status:      Spouse name: Not on file    Number of children: 1    Years of education: Not on file    Highest education level: Not on file   Occupational History    Occupation: Office work, purchasing    Occupation: Office work, telephone orders   Tobacco Use    Smoking status: Every Day     Packs/day: 1.00     Years: 40.00     Additional pack years: 0.00     Total pack years: 40.00     Types: Cigarettes     Last attempt to quit: 10/10/2020     Years since quitting: 3.2    Smokeless tobacco: Former   Vaping Use    Vaping Use: Former   Substance and Sexual Activity    Alcohol use: Yes     Alcohol/week: 0.0 standard drinks of alcohol     Comment: rarely    Drug use: No    Sexual activity: Not Currently   Other Topics Concern    Grew up on a farm Not Asked    History of tanning Not Asked    Outdoor occupation Not Asked    Pt has a pacemaker No    Pt has a defibrillator No    Breast feeding Not Asked    Reaction to local anesthetic No     Service Not Asked    Blood Transfusions Not Asked     Caffeine Concern No    Occupational Exposure Not Asked    Hobby Hazards Not Asked    Sleep Concern Not Asked    Stress Concern Not Asked    Weight Concern Not Asked    Special Diet Not Asked    Back Care Not Asked    Exercise Not Asked    Bike Helmet Not Asked    Seat Belt Not Asked    Self-Exams Not Asked    Left Handed Not Asked    Right Handed Yes    Currently spends a great deal of time in the sun Not Asked    Past Sunlamp Treatments for Acne Not Asked    Hx of Spending Great Deal of Time in Sun Not Asked    Bad sunburns in the past Not Asked    Tanning Salons in the Past Not Asked    Hx of Radiation Treatments Not Asked    Regular use of sun block Not Asked   Social History Narrative    Live with son    Work in office / office work     Social Determinants of Health     Financial Resource Strain: Not on file   Food Insecurity: Not on file   Transportation Needs: Not on file   Physical Activity: Not on file   Stress: Not on file   Social Connections: Not on file   Housing Stability: Not on file          Current Outpatient Medications   Medication Sig Dispense Refill    metFORMIN  MG Oral Tablet 24 Hr TAKE 2 TABLETS IN THE MORNING AND TAKE 2 TABLETS IN THE EVENING, TAKE WITH MEALS 360 tablet 0    STEGLATRO 15 MG Oral Tab tablet Take 1 tablet (15 mg total) by mouth every morning. 90 tablet 3    atorvastatin 80 MG Oral Tab Take 1 tablet (80 mg total) by mouth nightly. 90 tablet 3    venlafaxine  MG Oral Tablet 24 Hr Take 1 tablet (225 mg total) by mouth every morning. 90 tablet 3    albuterol 108 (90 Base) MCG/ACT Inhalation Aero Soln Inhale 2 puffs into the lungs 4 (four) times daily as needed for Wheezing. 25.5 g 3    cetirizine 10 MG Oral Tab Take 1 tablet (10 mg total) by mouth daily. 90 tablet 3    pantoprazole 40 MG Oral Tab EC Take 1 tablet (40 mg total) by mouth daily. 90 tablet 3    Budesonide-Formoterol Fumarate (SYMBICORT) 80-4.5 MCG/ACT Inhalation Aerosol Inhale 2 puffs into the lungs 2 (two)  times daily. 10.2 g 11    CRANBERRY OR Take by mouth.      ONETOUCH ULTRA In Vitro Strip USE BID AND PRN      OneTouch Delica Lancets 30G Does not apply Misc USE BID AND PRN      Potassium (POTASSIMIN OR) Take by mouth.      Multiple Vitamins-Minerals (MULTIVITAMIN ADULT OR) Take by mouth.      Coenzyme Q10 (CO Q 10 OR) Take by mouth.      Misc Natural Products (GLUCOSAMINE CHOND COMPLEX/MSM OR) Take by mouth.      Docusate Sodium (STOOL SOFTENER OR) Take by mouth.      HYDROcodone-acetaminophen (NORCO) 7.5-325 MG Oral Tab Take 1 tablet by mouth every 8 (eight) hours as needed for Pain. (Patient not taking: Reported on 1/2/2024) 30 tablet 0    HYDROcodone-acetaminophen 5-325 MG Oral Tab Take 1 tablet by mouth every 6 (six) hours as needed. (Patient not taking: Reported on 11/20/2023) 10 tablet 0     Allergies:No Known Allergies   PHYSICAL EXAM:   Physical Exam  Physical Exam   Constitutional: . She appears well-developed and well-nourished. No distress.   HENT:   Head: Normocephalic.   Right Ear: Tympanic membrane and ear canal normal.   Left Ear: Tympanic membrane and ear canal normal.   Mouth/Throat: Oropharynx is clear and moist and mucous membranes are normal.   Eyes: Conjunctivae and EOM are normal. Pupils are equal, round, and reactive to light.   Neck: Normal range of motion. Neck supple. No thyromegaly present.   Cardiovascular: Normal rate, regular rhythm and no murmur heard.  Pulmonary/Chest: Effort normal.  She has audible inspiratory wheezing and when I have her lie down to listen to her abdomen she has audible wheezing without the stethoscope.  No respiratory distress.  She is not tachypneic.  She does have a smoker's cough.  She is not winded when she speaks.  Abdominal: Soft. Bowel sounds are normal. There is no hepatosplenomegaly. There is no tenderness.   Lymphadenopathy:     She has no  cervical adenopathy.   Neurological: She is alert and oriented to person, place, and time . She has normal  reflexes. No cranial nerve deficit.   Skin: Skin is warm and dry. No rash noted.   Psychiatric: She has a normal mood and affect  Lower legs: No edema of the legs bilaterally.  Right middle finger: She is tender over the flexor tendon at the palmar crease where she has a nodule.  I am able to make her finger lock.  Vitals reviewed.    Blood pressure 108/72, pulse 88, temperature 97 °F (36.1 °C), temperature source Tympanic, resp. rate 17, height 5' 9\" (1.753 m), weight 205 lb (93 kg), not currently breastfeeding.         ASSESSMENT/PLAN:     Diagnoses and all orders for this visit:    Adult general medical exam  Labs are in the system.  She will get them done Saturday  Age-related nuclear cataract of both eyes  -     OPHTHALMOLOGY - INTERNAL  Because you also have diabetes please see the ophthalmologist  Chronic obstructive pulmonary disease, unspecified COPD type (HCC)  -     PULMONARY - INTERNAL  -     fluticasone-salmeterol (WIXELA INHUB) 250-50 MCG/ACT Inhalation Aerosol Powder, Breath Activated; Inhale 1 puff into the lungs every 12 (twelve) hours.  -     Immunization Admin Counseling, Additional Component, 18 years and older  -     PCV20 (Prevnar 20)  I told her that Advair is covered under her insurance and we will start her on that.  She can take the albuterol as needed.  Referral to pulmonary done along with given her a pneumonia injection.  Cervical cancer screening  -     OBG - INTERNAL    Screening for colon cancer  Already has a set up for next April.  Visit for screening mammogram  -     [facility] JOSE 2D+3D SCREENING BILAT (CPT=77067/12160); Future    Need for vaccination  -     Zoster Vac (Shingrix) in office vaccine- Non-Medicare or Medicare with ABN  -     Immunization Admin Counseling, Additional Component, 18 years and older  -     PCV20 (Prevnar 20)    Tobacco use disorder  -     PULMONARY - INTERNAL  -     fluticasone-salmeterol (WIXELA INHUB) 250-50 MCG/ACT Inhalation Aerosol Powder, Breath  Activated; Inhale 1 puff into the lungs every 12 (twelve) hours.    Pulmonary nodule  -     PULMONARY - INTERNAL  Reviewed the CAT scan and she will see pulmonary to decide which specific scan would be best for her.  Controlled type 2 diabetes mellitus with microalbuminuria, without long-term current use of insulin  (HCC)  -     OPHTHALMOLOGY - INTERNAL  -     PODIATRY - INTERNAL  Must see the eye doctor and foot doctor  Mild intermittent asthma with acute exacerbation  I added Advair and she will continue her rescue inhaler  Trigger middle finger of right hand  Patient Instructions   Make a procedure visit appointment for a steroid injection into the trigger finger.        Referrals (if applicable)  Orders Placed This Encounter   Procedures    OBG - INTERNAL     ALSO WORKS AT [employer]     Referral Priority:   Routine     Referral Type:   OFFICE VISIT     Referred to Provider:   Shaunna Yeung MD     Requested Specialty:   OBSTETRICS & GYNECOLOGY     Number of Visits Requested:   3    PULMONARY - INTERNAL     Referral Priority:   Routine     Referral Type:   OFFICE VISIT     Referred to Provider:   Maty Carlisle MD     Requested Specialty:   PULMONARY DISEASES     Number of Visits Requested:   3    OPHTHALMOLOGY - INTERNAL     Phone number [phone].     Referral Priority:   Routine     Referral Type:   OFFICE VISIT     Referred to Provider:   Jed Muniz MD     Requested Specialty:   OPHTHALMOLOGY     Number of Visits Requested:   2    PODIATRY - INTERNAL     Referral Priority:   Routine     Referral Type:   OFFICE VISIT     Referred to Provider:   Colleen Capps DPM     Requested Specialty:   PODIATRIST     Number of Visits Requested:   3         Follow up if symptoms persist.  Take medicine (if given) as prescribed.  Approach to treatment discussed and patient/family member understands and agrees to plan.     No follow-ups on file.      Juanito Dias DO  1/2/2024

## 2024-01-06 ENCOUNTER — LAB ENCOUNTER (OUTPATIENT)
Dept: LAB | Age: 64
End: 2024-01-06
Attending: FAMILY MEDICINE
Payer: COMMERCIAL

## 2024-01-06 DIAGNOSIS — R80.9 CONTROLLED TYPE 2 DIABETES MELLITUS WITH MICROALBUMINURIA, WITHOUT LONG-TERM CURRENT USE OF INSULIN  (HCC): ICD-10-CM

## 2024-01-06 DIAGNOSIS — E11.29 CONTROLLED TYPE 2 DIABETES MELLITUS WITH MICROALBUMINURIA, WITHOUT LONG-TERM CURRENT USE OF INSULIN  (HCC): ICD-10-CM

## 2024-01-06 DIAGNOSIS — Z00.00 ADULT GENERAL MEDICAL EXAM: ICD-10-CM

## 2024-01-06 LAB
ALBUMIN SERPL-MCNC: 4.3 G/DL (ref 3.2–4.8)
ALBUMIN/GLOB SERPL: 1.7 {RATIO} (ref 1–2)
ALP LIVER SERPL-CCNC: 73 U/L
ALT SERPL-CCNC: 46 U/L
ANION GAP SERPL CALC-SCNC: 5 MMOL/L (ref 0–18)
AST SERPL-CCNC: 41 U/L (ref ?–34)
BASOPHILS # BLD AUTO: 0.01 X10(3) UL (ref 0–0.2)
BASOPHILS NFR BLD AUTO: 0.1 %
BILIRUB SERPL-MCNC: 0.4 MG/DL (ref 0.2–1.1)
BUN BLD-MCNC: 19 MG/DL (ref 9–23)
BUN/CREAT SERPL: 19 (ref 10–20)
CALCIUM BLD-MCNC: 9.3 MG/DL (ref 8.7–10.4)
CHLORIDE SERPL-SCNC: 107 MMOL/L (ref 98–112)
CHOLEST SERPL-MCNC: 147 MG/DL (ref ?–200)
CO2 SERPL-SCNC: 27 MMOL/L (ref 21–32)
CREAT BLD-MCNC: 1 MG/DL
CREAT UR-SCNC: 97.3 MG/DL
DEPRECATED RDW RBC AUTO: 50 FL (ref 35.1–46.3)
EGFRCR SERPLBLD CKD-EPI 2021: 64 ML/MIN/1.73M2 (ref 60–?)
EOSINOPHIL # BLD AUTO: 0 X10(3) UL (ref 0–0.7)
EOSINOPHIL NFR BLD AUTO: 0 %
ERYTHROCYTE [DISTWIDTH] IN BLOOD BY AUTOMATED COUNT: 13.6 % (ref 11–15)
EST. AVERAGE GLUCOSE BLD GHB EST-MCNC: 148 MG/DL (ref 68–126)
FASTING PATIENT LIPID ANSWER: YES
FASTING STATUS PATIENT QL REPORTED: YES
GLOBULIN PLAS-MCNC: 2.6 G/DL (ref 2.8–4.4)
GLUCOSE BLD-MCNC: 112 MG/DL (ref 70–99)
HBA1C MFR BLD: 6.8 % (ref ?–5.7)
HCT VFR BLD AUTO: 45.3 %
HDLC SERPL-MCNC: 48 MG/DL (ref 40–59)
HGB BLD-MCNC: 15.8 G/DL
IMM GRANULOCYTES # BLD AUTO: 0.04 X10(3) UL (ref 0–1)
IMM GRANULOCYTES NFR BLD: 0.4 %
LDLC SERPL CALC-MCNC: 72 MG/DL (ref ?–100)
LYMPHOCYTES # BLD AUTO: 2.56 X10(3) UL (ref 1–4)
LYMPHOCYTES NFR BLD AUTO: 25 %
MCH RBC QN AUTO: 34.8 PG (ref 26–34)
MCHC RBC AUTO-ENTMCNC: 34.9 G/DL (ref 31–37)
MCV RBC AUTO: 99.8 FL
MICROALBUMIN UR-MCNC: 12.9 MG/DL
MICROALBUMIN/CREAT 24H UR-RTO: 132.6 UG/MG (ref ?–30)
MONOCYTES # BLD AUTO: 0.56 X10(3) UL (ref 0.1–1)
MONOCYTES NFR BLD AUTO: 5.5 %
NEUTROPHILS # BLD AUTO: 7.05 X10 (3) UL (ref 1.5–7.7)
NEUTROPHILS # BLD AUTO: 7.05 X10(3) UL (ref 1.5–7.7)
NEUTROPHILS NFR BLD AUTO: 69 %
NONHDLC SERPL-MCNC: 99 MG/DL (ref ?–130)
OSMOLALITY SERPL CALC.SUM OF ELEC: 291 MOSM/KG (ref 275–295)
PLATELET # BLD AUTO: 213 10(3)UL (ref 150–450)
POTASSIUM SERPL-SCNC: 4.4 MMOL/L (ref 3.5–5.1)
PROT SERPL-MCNC: 6.9 G/DL (ref 5.7–8.2)
RBC # BLD AUTO: 4.54 X10(6)UL
SODIUM SERPL-SCNC: 139 MMOL/L (ref 136–145)
TRIGL SERPL-MCNC: 159 MG/DL (ref 30–149)
TSI SER-ACNC: 1.1 MIU/ML (ref 0.55–4.78)
VLDLC SERPL CALC-MCNC: 24 MG/DL (ref 0–30)
WBC # BLD AUTO: 10.2 X10(3) UL (ref 4–11)

## 2024-01-06 PROCEDURE — 3061F NEG MICROALBUMINURIA REV: CPT | Performed by: FAMILY MEDICINE

## 2024-01-06 PROCEDURE — 85025 COMPLETE CBC W/AUTO DIFF WBC: CPT

## 2024-01-06 PROCEDURE — 84443 ASSAY THYROID STIM HORMONE: CPT

## 2024-01-06 PROCEDURE — 36415 COLL VENOUS BLD VENIPUNCTURE: CPT

## 2024-01-06 PROCEDURE — 82570 ASSAY OF URINE CREATININE: CPT

## 2024-01-06 PROCEDURE — 3060F POS MICROALBUMINURIA REV: CPT | Performed by: FAMILY MEDICINE

## 2024-01-06 PROCEDURE — 80061 LIPID PANEL: CPT

## 2024-01-06 PROCEDURE — 83036 HEMOGLOBIN GLYCOSYLATED A1C: CPT

## 2024-01-06 PROCEDURE — 3044F HG A1C LEVEL LT 7.0%: CPT | Performed by: FAMILY MEDICINE

## 2024-01-06 PROCEDURE — 80053 COMPREHEN METABOLIC PANEL: CPT

## 2024-01-06 PROCEDURE — 82043 UR ALBUMIN QUANTITATIVE: CPT

## 2024-01-22 ENCOUNTER — OFFICE VISIT (OUTPATIENT)
Dept: FAMILY MEDICINE CLINIC | Facility: CLINIC | Age: 64
End: 2024-01-22

## 2024-01-22 VITALS
TEMPERATURE: 98 F | DIASTOLIC BLOOD PRESSURE: 67 MMHG | HEART RATE: 89 BPM | BODY MASS INDEX: 30.57 KG/M2 | HEIGHT: 69 IN | SYSTOLIC BLOOD PRESSURE: 100 MMHG | WEIGHT: 206.38 LBS

## 2024-01-22 DIAGNOSIS — E11.29 CONTROLLED TYPE 2 DIABETES MELLITUS WITH MICROALBUMINURIA, WITHOUT LONG-TERM CURRENT USE OF INSULIN  (HCC): ICD-10-CM

## 2024-01-22 DIAGNOSIS — M65.331 TRIGGER MIDDLE FINGER OF RIGHT HAND: Primary | ICD-10-CM

## 2024-01-22 DIAGNOSIS — M79.644 PAIN IN FINGER OF RIGHT HAND: ICD-10-CM

## 2024-01-22 DIAGNOSIS — R80.9 CONTROLLED TYPE 2 DIABETES MELLITUS WITH MICROALBUMINURIA, WITHOUT LONG-TERM CURRENT USE OF INSULIN  (HCC): ICD-10-CM

## 2024-01-22 PROCEDURE — 20550 NJX 1 TENDON SHEATH/LIGAMENT: CPT | Performed by: FAMILY MEDICINE

## 2024-01-22 PROCEDURE — 3078F DIAST BP <80 MM HG: CPT | Performed by: FAMILY MEDICINE

## 2024-01-22 PROCEDURE — 3008F BODY MASS INDEX DOCD: CPT | Performed by: FAMILY MEDICINE

## 2024-01-22 PROCEDURE — 3074F SYST BP LT 130 MM HG: CPT | Performed by: FAMILY MEDICINE

## 2024-01-22 PROCEDURE — 99213 OFFICE O/P EST LOW 20 MIN: CPT | Performed by: FAMILY MEDICINE

## 2024-01-22 NOTE — PROGRESS NOTES
Patient ID: Harini Cuadra is a 63 year old female.    HPI  Chief Complaint   Patient presents with    Finger Pain     Right hand - middle finger      Last seen by me on 01/02/2024.    Pt is here today for an injection in her right 3rd finger. Hx trigger middle finger of the right hand and carpal tunnel of the right wrist.     I reviewed her labs from January 2024; she has proteinuria. Last A1c value was 6.8% done 1/6/2024.       Wt Readings from Last 6 Encounters:   01/22/24 206 lb 6.4 oz   01/02/24 205 lb   12/26/23 204 lb   11/20/23 204 lb   11/16/23 205 lb 0.4 oz   08/01/23 205 lb 6.4 oz       BMI Readings from Last 6 Encounters:   01/22/24 30.48 kg/m²   01/02/24 30.27 kg/m²   12/26/23 30.13 kg/m²   11/20/23 30.13 kg/m²   11/16/23 30.28 kg/m²   08/01/23 30.33 kg/m²       BP Readings from Last 6 Encounters:   01/22/24 100/67   01/02/24 108/72   11/20/23 125/67   11/16/23 123/69   08/01/23 128/77   07/27/23 149/74         Review of Systems   Respiratory:  Negative for shortness of breath.    Cardiovascular:  Negative for chest pain.           Medical History:      Past Medical History:   Diagnosis Date    Acute carpal tunnel syndrome of right wrist 07/26/2021    Right Endoscopic Carpal Tunnel Release     Anxiety state, unspecified     Arthritis     Asthma     Back problem     Borderline diabetes     Colon polyps     Decorative tattoo     Depression     Diabetes (HCC)     Dysplastic nevus 2021    right posterior shoulder - mild atypia    Dysplastic nevus 2021    mid back - moderate atypia    Esophageal reflux     High cholesterol     Lentiginous compound nevus 09/09/2019    lower lateral back    Malignant melanoma of upper back (HCC) 10/18/2018    Visual impairment        Past Surgical History:   Procedure Laterality Date    BIOPSY OF SKIN LESION Right 11/08/2018    melanoma upper back    BREAST BIOPSY Right 1987    skin cyst    BREAST BIOPSY Left 2018    skin cyst    COLONOSCOPY  10/02/2015    EMILIANA FLANNERY  GLAND/CYST Left 2012    HEMORRHOIDECTOMY      PPH    NEBULIZER, ULTRASONIC  10/25/2019      1993    WRIST ARTHROSCOP,RELEASE XVERS LIG Right 2021    Right Endoscopic Carpal Tunnel Release          Current Outpatient Medications   Medication Sig Dispense Refill    metFORMIN  MG Oral Tablet 24 Hr TAKE 2 TABLETS IN THE MORNING AND TAKE 2 TABLETS IN THE EVENING, TAKE WITH MEALS 360 tablet 0    STEGLATRO 15 MG Oral Tab tablet Take 1 tablet (15 mg total) by mouth every morning. 90 tablet 3    atorvastatin 80 MG Oral Tab Take 1 tablet (80 mg total) by mouth nightly. 90 tablet 3    venlafaxine  MG Oral Tablet 24 Hr Take 1 tablet (225 mg total) by mouth every morning. 90 tablet 3    albuterol 108 (90 Base) MCG/ACT Inhalation Aero Soln Inhale 2 puffs into the lungs 4 (four) times daily as needed for Wheezing. 25.5 g 3    cetirizine 10 MG Oral Tab Take 1 tablet (10 mg total) by mouth daily. 90 tablet 3    pantoprazole 40 MG Oral Tab EC Take 1 tablet (40 mg total) by mouth daily. 90 tablet 3    CRANBERRY OR Take by mouth.      ONETOUCH ULTRA In Vitro Strip USE BID AND PRN      OneTouch Delica Lancets 30G Does not apply Misc USE BID AND PRN      Potassium (POTASSIMIN OR) Take by mouth.      Multiple Vitamins-Minerals (MULTIVITAMIN ADULT OR) Take by mouth.      Coenzyme Q10 (CO Q 10 OR) Take by mouth.      Misc Natural Products (GLUCOSAMINE CHOND COMPLEX/MSM OR) Take by mouth.      Docusate Sodium (STOOL SOFTENER OR) Take by mouth.      fluticasone-salmeterol (WIXELA INHUB) 250-50 MCG/ACT Inhalation Aerosol Powder, Breath Activated Inhale 1 puff into the lungs every 12 (twelve) hours. 3 each 2    HYDROcodone-acetaminophen (NORCO) 7.5-325 MG Oral Tab Take 1 tablet by mouth every 8 (eight) hours as needed for Pain. (Patient not taking: Reported on 2024) 30 tablet 0    HYDROcodone-acetaminophen 5-325 MG Oral Tab Take 1 tablet by mouth every 6 (six) hours as needed. (Patient not taking: Reported  on 1/22/2024) 10 tablet 0     Allergies:No Known Allergies     Physical Exam:       Physical Exam  Blood pressure 100/67, pulse 89, temperature 97.6 °F (36.4 °C), height 5' 9\" (1.753 m), weight 206 lb 6.4 oz, not currently breastfeeding.      Physical Exam   Constitutional: Patient is oriented to person, place, and time. Patient appears well-developed and well-nourished. No distress.   Head: Normocephalic.   Eyes: Conjunctivae and EOM are normal.   Cardiovascular: Normal rate, regular rhythm and normal heart sounds.    Pulmonary/Chest: Effort normal and breath sounds normal. No respiratory distress.   Neurological: Patient is alert and oriented to person, place, and time.   Skin: Skin is warm.  Psychiatry: Normal mood and affect.  Right Hand: Nodule that is tender over the 3rd flexor tendon at the palmar crease.       Joint Injection flexor tendon of the right 3rd finger    Pre-procedure care:  Consent was obtained, Procedure/risks were explained, Questions were answered, Patient was prepped and draped in the usual sterile fashion, timeout was taken with correct side and site confirmed and correct patient identified    Injection Detail:  Procedure:  Site 1:  Joint injection of the flexor tendon of the right third finger at the palmar crease    Site Prepped:  Yes  Technique:  aseptic technique with povidone-iodine and alcohol    Needle gauge:  25 gauge    Placement confirmed by:  manual palpation    Fluid:  N/A    Milliliter(s) fluid withdrawn:  N/A    Medication:  triamcinolone acetonide  Dose:  20 mg (0.5 ml) kenalog, 0.5 ml 1% lidocaine w/o epi     Sterile dressing:  Pressure - Yes  Adhesive - Yes    Patient response:  The patient did tolerate the procedure well.    Plan:  Appropriate post injection instructions given.      ==================================================================================================================================================    Vitals reviewed.           Assessment/Plan:       Diagnoses and all orders for this visit:    Trigger middle finger of right hand  -     triamcinolone acetonide (Kenalog-40) 40 MG/ML injection 20 mg  -     tendon sheath/ligament  She tolerated procedure wonderfully.  No complications.  I let her know that if after 4 to 6 weeks she is still having pain, locking then we need to have her see the hand specialist.  She understands the plan.  Pain in finger of right hand  -     triamcinolone acetonide (Kenalog-40) 40 MG/ML injection 20 mg  -     tendon sheath/ligament    Controlled type 2 diabetes mellitus with microalbuminuria, without long-term current use of insulin  (HCC)  We talked about her diabetes.  She is doing very well with that.  Continue present management.      Referrals (if applicable)  No orders of the defined types were placed in this encounter.      Follow up if symptoms persist.  Take medicine (if given) as prescribed.  Approach to treatment discussed and patient/family member understands and agrees to plan.     Return in about 3 months (around 4/22/2024) for Diabetes Followup.    There are no Patient Instructions on file for this visit.    Kushal Cannon    1/22/2024    By signing my name below, IKushal,  attest that this documentation has been prepared under the direction and in the presence of Juanito Dias DO.   Electronically Signed: Kushal Cannon, 1/22/2024, 5:09 PM.    I, Juanito Dias DO,  personally performed the services described in this documentation. All medical record entries made by the scribe were at my direction and in my presence.  I have reviewed the chart and discharge instructions (if applicable) and agree that the record reflects my personal performance and is accurate and complete.  Juanito Dias DO, 1/24/2024, 10:06 AM

## 2024-01-24 RX ORDER — TRIAMCINOLONE ACETONIDE 40 MG/ML
20 INJECTION, SUSPENSION INTRA-ARTICULAR; INTRAMUSCULAR ONCE
Status: COMPLETED | OUTPATIENT
Start: 2024-01-24 | End: 2024-01-25

## 2024-01-25 PROCEDURE — 3008F BODY MASS INDEX DOCD: CPT | Performed by: FAMILY MEDICINE

## 2024-01-25 PROCEDURE — 3074F SYST BP LT 130 MM HG: CPT | Performed by: FAMILY MEDICINE

## 2024-01-25 PROCEDURE — 3078F DIAST BP <80 MM HG: CPT | Performed by: FAMILY MEDICINE

## 2024-01-25 RX ADMIN — TRIAMCINOLONE ACETONIDE 20 MG: 40 INJECTION, SUSPENSION INTRA-ARTICULAR; INTRAMUSCULAR at 08:04:00

## 2024-01-26 ENCOUNTER — OFFICE VISIT (OUTPATIENT)
Dept: DERMATOLOGY CLINIC | Facility: CLINIC | Age: 64
End: 2024-01-26

## 2024-01-26 DIAGNOSIS — Z86.018 HISTORY OF DYSPLASTIC NEVUS: ICD-10-CM

## 2024-01-26 DIAGNOSIS — D23.9 BENIGN NEOPLASM OF SKIN, UNSPECIFIED LOCATION: ICD-10-CM

## 2024-01-26 DIAGNOSIS — D22.9 MULTIPLE NEVI: Primary | ICD-10-CM

## 2024-01-26 DIAGNOSIS — L82.1 SEBORRHEIC KERATOSES: ICD-10-CM

## 2024-01-26 DIAGNOSIS — Z80.8 FAMILY HISTORY OF NONMELANOMA SKIN CANCER: ICD-10-CM

## 2024-01-26 DIAGNOSIS — Z85.820 PERSONAL HISTORY OF MALIGNANT MELANOMA OF SKIN: ICD-10-CM

## 2024-01-26 DIAGNOSIS — Z80.8 FAMILY HISTORY OF MELANOMA: ICD-10-CM

## 2024-01-26 PROCEDURE — 99213 OFFICE O/P EST LOW 20 MIN: CPT | Performed by: DERMATOLOGY

## 2024-02-04 NOTE — PROGRESS NOTES
Harini Cuadra is a 63 year old female.  HPI:     CC:    Chief Complaint   Patient presents with    Full Skin Exam     LOV 23 - Pt presenting for full body skin exam.  Pt has personal hx of Dysplastic Nevus and Melanoma (2018).         Allergies:  Patient has no known allergies.    HISTORY:    Past Medical History:   Diagnosis Date    Acute carpal tunnel syndrome of right wrist 2021    Right Endoscopic Carpal Tunnel Release     Anxiety state, unspecified     Arthritis     Asthma     Back problem     Borderline diabetes     Colon polyps     Decorative tattoo     Depression     Diabetes (HCC)     Dysplastic nevus     right posterior shoulder - mild atypia    Dysplastic nevus     mid back - moderate atypia    Esophageal reflux     High cholesterol     Lentiginous compound nevus 2019    lower lateral back    Malignant melanoma of upper back (HCC) 10/18/2018    Visual impairment       Past Surgical History:   Procedure Laterality Date    BIOPSY OF SKIN LESION Right 2018    melanoma upper back    BREAST BIOPSY Right     skin cyst    BREAST BIOPSY Left     skin cyst    COLONOSCOPY  10/02/2015    EXCIS BARTHOLIN GLAND/CYST Left 2012    HEMORRHOIDECTOMY      PPH    NEBULIZER, ULTRASONIC  10/25/2019      1993    WRIST ARTHROSCOP,RELEASE XVERS LIG Right 2021    Right Endoscopic Carpal Tunnel Release      Family History   Problem Relation Age of Onset    Hypertension Mother     Other (Other) Mother         a fib    Cancer Maternal Grandmother         ?liver    Cancer Son         malignant melanoma    Bipolar Disorder Brother     Other (Other) Brother         Diverticulitis    Blindness Brother     Other (Other) Brother         Deaf    Diabetes Neg     Glaucoma Neg     Macular degeneration Neg       Social History     Socioeconomic History    Marital status:     Number of children: 1   Occupational History    Occupation: Office work, purchasing    Occupation:  Office work, telephone orders   Tobacco Use    Smoking status: Every Day     Packs/day: 1.00     Years: 40.00     Additional pack years: 0.00     Total pack years: 40.00     Types: Cigarettes     Last attempt to quit: 10/10/2020     Years since quitting: 3.3    Smokeless tobacco: Former   Vaping Use    Vaping Use: Former   Substance and Sexual Activity    Alcohol use: Yes     Alcohol/week: 0.0 standard drinks of alcohol     Comment: rarely    Drug use: No    Sexual activity: Not Currently   Other Topics Concern    Pt has a pacemaker No    Pt has a defibrillator No    Reaction to local anesthetic No    Caffeine Concern No    Right Handed Yes   Social History Narrative    Live with son    Work in office / office work        Current Outpatient Medications   Medication Sig Dispense Refill    fluticasone-salmeterol (WIXELA INHUB) 250-50 MCG/ACT Inhalation Aerosol Powder, Breath Activated Inhale 1 puff into the lungs every 12 (twelve) hours. 3 each 2    metFORMIN  MG Oral Tablet 24 Hr TAKE 2 TABLETS IN THE MORNING AND TAKE 2 TABLETS IN THE EVENING, TAKE WITH MEALS 360 tablet 0    STEGLATRO 15 MG Oral Tab tablet Take 1 tablet (15 mg total) by mouth every morning. 90 tablet 3    atorvastatin 80 MG Oral Tab Take 1 tablet (80 mg total) by mouth nightly. 90 tablet 3    venlafaxine  MG Oral Tablet 24 Hr Take 1 tablet (225 mg total) by mouth every morning. 90 tablet 3    albuterol 108 (90 Base) MCG/ACT Inhalation Aero Soln Inhale 2 puffs into the lungs 4 (four) times daily as needed for Wheezing. 25.5 g 3    cetirizine 10 MG Oral Tab Take 1 tablet (10 mg total) by mouth daily. 90 tablet 3    pantoprazole 40 MG Oral Tab EC Take 1 tablet (40 mg total) by mouth daily. 90 tablet 3    CRANBERRY OR Take by mouth.      ONETOUCH ULTRA In Vitro Strip USE BID AND PRN      OneTouch Delica Lancets 30G Does not apply Misc USE BID AND PRN      Potassium (POTASSIMIN OR) Take by mouth.      Multiple Vitamins-Minerals (MULTIVITAMIN  ADULT OR) Take by mouth.      Coenzyme Q10 (CO Q 10 OR) Take by mouth.      Misc Natural Products (GLUCOSAMINE CHOND COMPLEX/MSM OR) Take by mouth.      HYDROcodone-acetaminophen (NORCO) 7.5-325 MG Oral Tab Take 1 tablet by mouth every 8 (eight) hours as needed for Pain. (Patient not taking: Reported on 2024) 30 tablet 0    HYDROcodone-acetaminophen 5-325 MG Oral Tab Take 1 tablet by mouth every 6 (six) hours as needed. (Patient not taking: Reported on 2024) 10 tablet 0    Docusate Sodium (STOOL SOFTENER OR) Take by mouth. (Patient not taking: Reported on 2024)       Allergies:   No Known Allergies    Past Medical History:   Diagnosis Date    Acute carpal tunnel syndrome of right wrist 2021    Right Endoscopic Carpal Tunnel Release     Anxiety state, unspecified     Arthritis     Asthma     Back problem     Borderline diabetes     Colon polyps     Decorative tattoo     Depression     Diabetes (HCC)     Dysplastic nevus     right posterior shoulder - mild atypia    Dysplastic nevus     mid back - moderate atypia    Esophageal reflux     High cholesterol     Lentiginous compound nevus 2019    lower lateral back    Malignant melanoma of upper back (HCC) 10/18/2018    Visual impairment      Past Surgical History:   Procedure Laterality Date    BIOPSY OF SKIN LESION Right 2018    melanoma upper back    BREAST BIOPSY Right     skin cyst    BREAST BIOPSY Left     skin cyst    COLONOSCOPY  10/02/2015    EXCIS BARTHOLIN GLAND/CYST Left 2012    HEMORRHOIDECTOMY      PPH    NEBULIZER, ULTRASONIC  10/25/2019      1993    WRIST ARTHROSCOP,RELEASE XVERS LIG Right 2021    Right Endoscopic Carpal Tunnel Release     Social History     Socioeconomic History    Marital status:      Spouse name: Not on file    Number of children: 1    Years of education: Not on file    Highest education level: Not on file   Occupational History    Occupation: Office work,  purchasing    Occupation: Office work, telephone orders   Tobacco Use    Smoking status: Every Day     Packs/day: 1.00     Years: 40.00     Additional pack years: 0.00     Total pack years: 40.00     Types: Cigarettes     Last attempt to quit: 10/10/2020     Years since quitting: 3.3    Smokeless tobacco: Former   Vaping Use    Vaping Use: Former   Substance and Sexual Activity    Alcohol use: Yes     Alcohol/week: 0.0 standard drinks of alcohol     Comment: rarely    Drug use: No    Sexual activity: Not Currently   Other Topics Concern    Grew up on a farm Not Asked    History of tanning Not Asked    Outdoor occupation Not Asked    Pt has a pacemaker No    Pt has a defibrillator No    Breast feeding Not Asked    Reaction to local anesthetic No     Service Not Asked    Blood Transfusions Not Asked    Caffeine Concern No    Occupational Exposure Not Asked    Hobby Hazards Not Asked    Sleep Concern Not Asked    Stress Concern Not Asked    Weight Concern Not Asked    Special Diet Not Asked    Back Care Not Asked    Exercise Not Asked    Bike Helmet Not Asked    Seat Belt Not Asked    Self-Exams Not Asked    Left Handed Not Asked    Right Handed Yes    Currently spends a great deal of time in the sun Not Asked    Past Sunlamp Treatments for Acne Not Asked    Hx of Spending Great Deal of Time in Sun Not Asked    Bad sunburns in the past Not Asked    Tanning Salons in the Past Not Asked    Hx of Radiation Treatments Not Asked    Regular use of sun block Not Asked   Social History Narrative    Live with son    Work in office / office work     Social Determinants of Health     Financial Resource Strain: Not on file   Food Insecurity: Not on file   Transportation Needs: Not on file   Physical Activity: Not on file   Stress: Not on file   Social Connections: Not on file   Housing Stability: Not on file     Family History   Problem Relation Age of Onset    Hypertension Mother     Other (Other) Mother         a fib     Cancer Maternal Grandmother         ?liver    Cancer Son         malignant melanoma    Bipolar Disorder Brother     Other (Other) Brother         Diverticulitis    Blindness Brother     Other (Other) Brother         Deaf    Diabetes Neg     Glaucoma Neg     Macular degeneration Neg        There were no vitals filed for this visit.    HPI:    Chief Complaint   Patient presents with    Full Skin Exam     LOV 7/26/23 - Pt presenting for full body skin exam.  Pt has personal hx of Dysplastic Nevus and Melanoma (2018).     Patient here for follow-up.  No particular concerns.  Overall no new suspicious lesions no problematic lesions.  Careful with sun exposure.  Nothing else really changing   no other new complaints  Still lots of stress at home.  Has been healthy.  No recent illnesses.  Follow-up history melanoma family history melanoma.-Son, nonmelanoma skin cancer in her mother recent excision dysplastic nevus left mid back, biopsy right posterior shoulder atypical nevus    Has been careful with sunscreen.   Nothing really changing as far as patient aware.  Scar on upper back  at times improving.    No particular changes in skin lesions noted by patient denies changing lesions no other particular lesions of concern.    Past notes/ records and appropriate/relevant lab results including pathology and past body maps reviewed. Updated and new information noted in current visit.     Status post excision of melanoma right posterior shoulder upper back 10/2018.  0.5 mm     fhx skin ca-nmsc father , mother and melanoma in son.     Patient presents with concerns above.    Patient has been in their usual state of health.  History, medications, allergies reviewed as noted.      ROS:  Denies any other systemic complaints.10 point review of systems was completed.  Pertinent positives and negatives noted in the the HPI.  No new or changeing lesions other than noted above. No fevers, chills, night sweats, unusual sun  sensitivity.  No other skin complaints.        History, medications, allergies reviewed as noted.       Physical Examination:     Well-developed well-nourished patient alert oriented in no acute distress.  Exam total-body performed, including scalp, head, neck, face,nails, hair, external eyes, including conjunctival mucosa, eyelids, lips external ears, back, chest,/ breasts, axillae,  abdomen, arms, legs, palms.     Multiple light to medium brown, well marginated, uniformly pigmented, macules and papules 6 mm and less scattered on exam. pigmented lesions examined with dermoscopy benign-appearing patterns.     Waxy tannish keratotic papules scattered, cherry-red vascular papules scattered.    See map today's date for lesions noted .      Otherwise remarkable for lesions as noted on map.  See details of examination  See Assessment /Plan for additional history and physical exam also:    Assessment / plan:    No orders of the defined types were placed in this encounter.      Meds & Refills for this Visit:  Requested Prescriptions      No prescriptions requested or ordered in this encounter         Encounter Diagnoses   Name Primary?    Multiple nevi Yes    History of dysplastic nevus     Seborrheic keratoses     Family history of melanoma     Family history of nonmelanoma skin cancer     Personal history of malignant melanoma of skin     Benign neoplasm of skin, unspecified location        See details on map.      Remarkable for:  Overall stable no significant changes in exam no new suspicious pigmented lesions, no other AK's continue regular monitoring every 6 months    Excoriations left shoulder, legs.  's nodules.  Monitor patient aware  Recheck left shoulder at follow-up    No AK's no new suspicious pigmented lesions.  No significant changes in exam.  Continue careful monitoring follow-up      Pigmented lesions essentially unchanged.  Continue careful follow-up monitoring every 6 months or as needed  Continue  careful follow-up and monitoring    No new atypical features in nevi.  No new suspicious lesions continue sun protection.  Patient's not been outdoors as much recently.  Right posterior shoulder2/21 lentiginous junctional dysplastic nevus mild atypia associated with seborrheic keratosis margins clear on biopsy      Left mid back atypical nevus post reexcision pathology lentiginous dysplastic compound nevus moderate architectural and mild to moderate cytologic atypia close margins excised3/21    Discomfort improving amitriptyline helps has not needed to take this as frequently for pain in the scar overall this is improved significantly    Dermal papular nodule pinkish at left medial knee, distal thigh 4 mm.  Patient does pick at this.  Will observe currently.  Unchanged 's nodules as noted above    Multiple benign keratoses scattered few new lesions over the arms chest back    Waxy tan keratotic papules lesions in areas of concern as noted reassurance given.  Benign nature discussed.  Possibility of cryo, alphahydroxy acids over-the-counter retinol's discussed.    Extensive lentigines keratoses reassurance  Crusted verrucoid keratotic papules scattered along the posterior scalp consistent with 's nodules.  Discussed pathophysiology diagnosis.  Consider possible cryo.  Reassurance given lesions continue to be bothersome consider biopsy.  Benign nature discussed.  Avoid manipulating.    No AK's    Biopsy 2019  Lentiginous compound nevus from left back reassurance no evidence recurrence    Status post excision melanoma 10/2018 0.5 mm.  No adenopathy no recurrence excellent cosmetic outcome.  Scar itchy intermittently.  Reassurance given.    Macule r flank with central darker pigment-4mm unchanged observe probable juctional nevs vs sk unchanged multiple waxy tan papules scattered over the shoulders upper back lower extremities reassurance.  Extensive lentigines    Scattered waxy tan papules  reassurance.  Dermatofibroma right post thigh left lower leg, right lower leg` given no other new suspicious pigmented lesions.  Fair skin, moderate sun damage continue careful sun protection.  Regular skin    Extensive nevi generalized, diffuse lenitgenes and ephelides.   No significant changes in pigmented lesions.  Continue careful sun protection regular skin checks follow-up 6 months or as needed    please refer to map for specific lesions.  See additional diagnoses.  Pros cons of various therapies, risks benefits discussed.Pathophysiology discussed with patient.  Therapeutic options reviewed.  See  Medications in grid.  Instructions reviewed at length.    Benign nevi, seborrheic  keratoses, cherry angiomas:  Reassurance regarding other benign skin lesions.Signs and symptoms of skin cancer, ABCDE's of melanoma discussed with patient. Sunscreen use, sun protection, self exams reviewed.  Followup as noted RTC routine checkup 4 months or p.r.n.    This note was generated using Dragon voice recognition software.  Please contact me regarding any confusion resulting from errors in recognition.     Encounter Times   Including precharting, reviewing chart, prior notes obtaining history: 10 minutes, medical exam :10 minutes, notes on body map, plan, counseling 10minutes My total time spent caring for the patient on the day of the encounter: 30 minutes    Note to patient and family: The 21st Century Cures Act makes medical notes like these available to patients. However, be advised this is a medical document. It is intended as vrxn-ip-oked communication and monitoring of a patient's care needs. It is written in medical language and may contain abbreviations or verbiage that are unfamiliar. It may appear blunt or direct. Medical documents are intended to carry relevant information, facts as evident and the clinical opinion of the practitioner.

## 2024-02-19 ENCOUNTER — TELEPHONE (OUTPATIENT)
Dept: FAMILY MEDICINE CLINIC | Facility: CLINIC | Age: 64
End: 2024-02-19

## 2024-02-19 DIAGNOSIS — E11.9 TYPE 2 DIABETES MELLITUS WITHOUT RETINOPATHY (HCC): ICD-10-CM

## 2024-02-19 DIAGNOSIS — R80.9 CONTROLLED TYPE 2 DIABETES MELLITUS WITH MICROALBUMINURIA, WITHOUT LONG-TERM CURRENT USE OF INSULIN (HCC): ICD-10-CM

## 2024-02-19 DIAGNOSIS — E11.29 CONTROLLED TYPE 2 DIABETES MELLITUS WITH MICROALBUMINURIA, WITHOUT LONG-TERM CURRENT USE OF INSULIN (HCC): ICD-10-CM

## 2024-02-20 RX ORDER — METFORMIN HYDROCHLORIDE 500 MG/1
TABLET, EXTENDED RELEASE ORAL
Qty: 360 TABLET | Refills: 3 | Status: SHIPPED | OUTPATIENT
Start: 2024-02-20

## 2024-02-20 NOTE — TELEPHONE ENCOUNTER
Refill passed per [facility] protocol.  Requested Prescriptions   Pending Prescriptions Disp Refills    METFORMIN  MG Oral Tablet 24 Hr [Pharmacy Med Name: METFORMIN HCL ER TABS 500MG] 360 tablet 3     Sig: TAKE 2 TABLETS IN THE MORNING AND TAKE 2 TABLETS IN THE EVENING, TAKE WITH MEALS       Diabetes Medication Protocol Passed - 2/19/2024 12:59 AM        Passed - Last A1C < 7.5 and within past 6 months     Lab Results   Component Value Date    A1C 6.8 (H) 01/06/2024             Passed - In person appointment or virtual visit in the past 6 mos or appointment in next 3 mos     Recent Outpatient Visits              3 weeks ago Multiple nevi    [facility] Gladys Hurd MD    Office Visit    4 weeks ago [facility]     [facility]    1 month ago Adult general medical exam    [facility] Juanito Dias,     Office Visit    1 month ago Lumbar spondylosis    [facility] Abram Moore MD    Office Visit    3 months ago Bilateral sciatica    [facility] German Reglaado MD    Office Visit          Future Appointments         Provider Department Appt Notes    In 2 weeks Shaunna Yeung MD [facility] - OB/GYN annual, last 6/16/2021    In 1 month Juanito Dias DO [facility] Luis Armando f/u    In 2 months WALT FRANK [facility] colon w/mac @[facility]    In 7 months Gladys Hurd MD [facility] full body               Passed - Microalbumin procedure in past 12 months or taking ACE/ARB        Passed - EGFRCR or GFRNAA > 50     GFR Evaluation  EGFRCR: 64 , resulted on 1/6/2024           Passed - GFR in the past 12 months           Recent Outpatient Visits              3 weeks ago Multiple nevi    [facility] Gladys Hurd MD    Office Visit    4 weeks ago Trigger middle finger of right hand    [facility], Juanito Mak,     Office Visit    1 month ago Adult general medical exam    [facility], Juanito Mak,     Office Visit    1 month ago Lumbar spondylosis    [facility] Abram Moore MD    Office Visit    3 months ago Bilateral sciatica    [facility], German Regalado MD    Office Visit          Future Appointments         Provider Department Appt Notes    In 2 weeks Shaunna Yeung MD [facility] - OB/GYN annual, last 6/16/2021    In 1 month Juanito Dias DO [facility] Luis Armando f/u    In 2 months MONIKA, PROCEDURE [facility] colon w/mac @[facility]    In 7 months Gladys Hurd MD [facility] full body

## 2024-03-11 ENCOUNTER — OFFICE VISIT (OUTPATIENT)
Dept: OBGYN CLINIC | Facility: CLINIC | Age: 64
End: 2024-03-11

## 2024-03-11 VITALS
BODY MASS INDEX: 32.18 KG/M2 | DIASTOLIC BLOOD PRESSURE: 71 MMHG | SYSTOLIC BLOOD PRESSURE: 121 MMHG | HEART RATE: 90 BPM | WEIGHT: 205 LBS | HEIGHT: 67 IN

## 2024-03-11 DIAGNOSIS — Z12.4 SCREENING FOR CERVICAL CANCER: ICD-10-CM

## 2024-03-11 DIAGNOSIS — Z01.419 ENCOUNTER FOR GYNECOLOGICAL EXAMINATION WITHOUT ABNORMAL FINDING: Primary | ICD-10-CM

## 2024-03-11 PROCEDURE — 99396 PREV VISIT EST AGE 40-64: CPT | Performed by: OBSTETRICS & GYNECOLOGY

## 2024-03-11 NOTE — PROGRESS NOTES
Harini Cuadra is a 64 year old female  No LMP recorded. Patient is postmenopausal.   Chief Complaint   Patient presents with    Gyn Exam     ANNUAL EXAM -- last seen . No mammograms done -- has order from PCP    Other     Right inguinal pain -- now gone. Has not d/w PCP   .      OBSTETRICS HISTORY:     OB History    Para Term  AB Living   4 1 1   3 1   SAB IAB Ectopic Multiple Live Births           1      # Outcome Date GA Lbr Jose/2nd Weight Sex Delivery Anes PTL Lv   4 AB 1994           3 Term     M NORMAL SPONT   BRANDON   2 AB            1 AB                GYNE HISTORY:     Periods none due to menopause        Latest Ref Rng & Units 3/19/2018     7:06 PM   RECENT PAP RESULTS   Thinprep Pap  Negative for intraepithelial lesion or malignancy    HPV Negative Negative          MEDICAL HISTORY:     Past Medical History:   Diagnosis Date    Acute carpal tunnel syndrome of right wrist 2021    Right Endoscopic Carpal Tunnel Release     Anxiety state, unspecified     Arthritis     Asthma (HCC)     Back problem     Borderline diabetes     Colon polyps     Decorative tattoo     Depression     Diabetes (HCC)     Dysplastic nevus     right posterior shoulder - mild atypia    Dysplastic nevus     mid back - moderate atypia    Esophageal reflux     High cholesterol     Lentiginous compound nevus 2019    lower lateral back    Malignant melanoma of upper back (HCC) 10/18/2018    Visual impairment      Past Surgical History:   Procedure Laterality Date    BIOPSY OF SKIN LESION Right 2018    melanoma upper back    BREAST BIOPSY Right     skin cyst    BREAST BIOPSY Left     skin cyst    COLONOSCOPY  10/02/2015    EXCIS BARTHOLIN GLAND/CYST Left 2012    HEMORRHOIDECTOMY      PPH    NEBULIZER, ULTRASONIC  10/25/2019      1993    WRIST ARTHROSCOP,RELEASE XVERS LIG Right 2021    Right Endoscopic Carpal Tunnel Release     OB History    Para Term   AB Living   4 1 1 0 3 1   SAB IAB Ectopic Multiple Live Births   0 0 0 0 1        SOCIAL HISTORY:     Social History     Socioeconomic History    Marital status:      Spouse name: Not on file    Number of children: 1    Years of education: Not on file    Highest education level: Not on file   Occupational History    Occupation: Office work, purchasing    Occupation: Office work, telephone orders   Tobacco Use    Smoking status: Every Day     Packs/day: 1.00     Years: 40.00     Additional pack years: 0.00     Total pack years: 40.00     Types: Cigarettes     Last attempt to quit: 10/10/2020     Years since quitting: 3.4    Smokeless tobacco: Former   Vaping Use    Vaping Use: Former   Substance and Sexual Activity    Alcohol use: Yes     Alcohol/week: 0.0 standard drinks of alcohol     Comment: rarely    Drug use: No    Sexual activity: Not Currently   Other Topics Concern    Grew up on a farm Not Asked    History of tanning Not Asked    Outdoor occupation Not Asked    Pt has a pacemaker No    Pt has a defibrillator No    Breast feeding Not Asked    Reaction to local anesthetic No     Service Not Asked    Blood Transfusions Not Asked    Caffeine Concern No    Occupational Exposure Not Asked    Hobby Hazards Not Asked    Sleep Concern Not Asked    Stress Concern Not Asked    Weight Concern Not Asked    Special Diet Not Asked    Back Care Not Asked    Exercise Not Asked    Bike Helmet Not Asked    Seat Belt Not Asked    Self-Exams Not Asked    Left Handed Not Asked    Right Handed Yes    Currently spends a great deal of time in the sun Not Asked    Past Sunlamp Treatments for Acne Not Asked    Hx of Spending Great Deal of Time in Sun Not Asked    Bad sunburns in the past Not Asked    Tanning Salons in the Past Not Asked    Hx of Radiation Treatments Not Asked    Regular use of sun block Not Asked   Social History Narrative    Live with son    Work in office / office work     Social Determinants  of Health     Financial Resource Strain: Not on file   Food Insecurity: Not on file   Transportation Needs: Not on file   Physical Activity: Not on file   Stress: Not on file   Social Connections: Not on file   Housing Stability: Not on file       FAMILY HISTORY:     Family History   Problem Relation Age of Onset    Hypertension Mother     Other (Other) Mother         a fib    Cancer Maternal Grandmother         ?liver    Cancer Son         malignant melanoma    Bipolar Disorder Brother     Other (Other) Brother         Diverticulitis    Blindness Brother     Other (Other) Brother         Deaf    Diabetes Neg     Glaucoma Neg     Macular degeneration Neg        MEDICATIONS:       Current Outpatient Medications:     metFORMIN  MG Oral Tablet 24 Hr, TAKE 2 TABLETS IN THE MORNING AND TAKE 2 TABLETS IN THE EVENING, TAKE WITH MEALS, Disp: 360 tablet, Rfl: 3    fluticasone-salmeterol (WIXELA INHUB) 250-50 MCG/ACT Inhalation Aerosol Powder, Breath Activated, Inhale 1 puff into the lungs every 12 (twelve) hours., Disp: 3 each, Rfl: 2    HYDROcodone-acetaminophen (NORCO) 7.5-325 MG Oral Tab, Take 1 tablet by mouth every 8 (eight) hours as needed for Pain. (Patient not taking: Reported on 1/22/2024), Disp: 30 tablet, Rfl: 0    HYDROcodone-acetaminophen 5-325 MG Oral Tab, Take 1 tablet by mouth every 6 (six) hours as needed. (Patient not taking: Reported on 1/22/2024), Disp: 10 tablet, Rfl: 0    STEGLATRO 15 MG Oral Tab tablet, Take 1 tablet (15 mg total) by mouth every morning., Disp: 90 tablet, Rfl: 3    atorvastatin 80 MG Oral Tab, Take 1 tablet (80 mg total) by mouth nightly., Disp: 90 tablet, Rfl: 3    venlafaxine  MG Oral Tablet 24 Hr, Take 1 tablet (225 mg total) by mouth every morning., Disp: 90 tablet, Rfl: 3    albuterol 108 (90 Base) MCG/ACT Inhalation Aero Soln, Inhale 2 puffs into the lungs 4 (four) times daily as needed for Wheezing., Disp: 25.5 g, Rfl: 3    cetirizine 10 MG Oral Tab, Take 1 tablet  (10 mg total) by mouth daily., Disp: 90 tablet, Rfl: 3    pantoprazole 40 MG Oral Tab EC, Take 1 tablet (40 mg total) by mouth daily., Disp: 90 tablet, Rfl: 3    CRANBERRY OR, Take by mouth., Disp: , Rfl:     ONETOUCH ULTRA In Vitro Strip, USE BID AND PRN, Disp: , Rfl:     OneTouch Delica Lancets 30G Does not apply Misc, USE BID AND PRN, Disp: , Rfl:     Potassium (POTASSIMIN OR), Take by mouth., Disp: , Rfl:     Multiple Vitamins-Minerals (MULTIVITAMIN ADULT OR), Take by mouth., Disp: , Rfl:     Coenzyme Q10 (CO Q 10 OR), Take by mouth., Disp: , Rfl:     Misc Natural Products (GLUCOSAMINE CHOND COMPLEX/MSM OR), Take by mouth., Disp: , Rfl:     Docusate Sodium (STOOL SOFTENER OR), Take by mouth. (Patient not taking: Reported on 1/26/2024), Disp: , Rfl:     ALLERGIES:     No Known Allergies      REVIEW OF SYSTEMS:     Constitutional:    denies fever / chills  Eyes:     denies blurred or double vision  Cardiovascular:  denies chest pain or palpitations  Respiratory:    denies shortness of breath  Gastrointestinal:  denies severe abdominal pain, frequent diarrhea or constipation, nausea / vomiting  Genitourinary:    denies dysuria, bothersome incontinence  Skin/Breast:   denies any breast pain, lumps, or discharge  Neurological:    denies frequent severe headaches  Psychiatric:   denies depression or anxiety, thoughts of harming self or others  Heme/Lymph:    denies easy bruising or bleeding    PHYSICAL EXAM:   Blood pressure 121/71, pulse 90, height 5' 7\" (1.702 m), weight 205 lb (93 kg), not currently breastfeeding.  Constitutional: well developed, well nourished  Head/Face: normocephalic  Neck/Thyroid: thyroid symmetric, no thyromegaly, no nodules, no adenopathy  Lymphatic:no abnormal supraclavicular or axillary adenopathy is noted  Breast: normal without palpable masses, tenderness, asymmetry, nipple discharge, nipple retraction or skin changes  Respiratory:  nonlabored breathing  Cardiovascular: regular rate and  rhythm  Abdomen:  soft, nontender, nondistended, no masses  Skin/Hair: no unusual rashes or bruises  Extremities: no edema, no cyanosis  Psychiatric:  Oriented to time, place, person and situation. Appropriate mood and affect    Pelvic Exam:  External Genitalia: normal appearance, hair distribution, and no lesions  Urethral Meatus:  normal in size, location, without lesions and prolapse  Bladder:  No fullness, masses or tenderness  Vagina:  Normal appearance without lesions, no abnormal discharge  Cervix:  Normal without tenderness on motion  Uterus: normal in size, contour, position, mobility, without tenderness  Adnexa: normal without masses or tenderness  Perineum: normal  Rectovaginal: no masses, normal tone, guiac negative  Anus: no hemorroids    ASSESSMENT & PLAN:     Yvonne was seen today for gyn exam and other.    Diagnoses and all orders for this visit:    Encounter for gynecological examination without abnormal finding      Strongly encouraged pt to get mammogram done.    SUMMARY:    Pap: Next cotest today per ASCCP guidelines.    Mammogram: has order from PCP    BCM: Postmenopausal    STD screening: declines    Colon cancer screening: scheduled    Misc: Calcium needs reviewed (1500 mg diet + supplement). Weight bearing exercise encouraged. Call if any VB (if perimenopausal, reviewed abn VB patterns)    HM updated    Depression screen:   Depression Screening (PHQ-2/PHQ-9): Over the LAST 2 WEEKS   Little interest or pleasure in doing things (over the last two weeks)?: Not at all    Feeling down, depressed, or hopeless (over the last two weeks)?: Not at all    PHQ-2 SCORE: 0          FOLLOW-UP     Return in about 1 year (around 3/11/2025) for annual gyne exam.    Note to patient and family:  The 21st Century Cures Act makes medical notes available to patients in the interest of transparency.  However, please be advised that this is a medical document.  It is intended as a peer to peer communication.  It is  written in medical language and may contain abbreviations or verbiage that are technical and unfamiliar.  It may appear blunt or direct.  Medical documents are intended to carry relevant information, facts as evident, and the clinical opinion of the practitioner.

## 2024-03-12 LAB — HPV I/H RISK 1 DNA SPEC QL NAA+PROBE: NEGATIVE

## 2024-03-24 LAB — LAST PAP RESULT: NORMAL

## 2024-03-25 ENCOUNTER — OFFICE VISIT (OUTPATIENT)
Dept: FAMILY MEDICINE CLINIC | Facility: CLINIC | Age: 64
End: 2024-03-25

## 2024-03-25 VITALS
TEMPERATURE: 97 F | DIASTOLIC BLOOD PRESSURE: 77 MMHG | HEART RATE: 82 BPM | SYSTOLIC BLOOD PRESSURE: 116 MMHG | HEIGHT: 69 IN | BODY MASS INDEX: 30.36 KG/M2 | WEIGHT: 205 LBS

## 2024-03-25 DIAGNOSIS — E11.9 TYPE 2 DIABETES MELLITUS WITHOUT RETINOPATHY (HCC): ICD-10-CM

## 2024-03-25 DIAGNOSIS — R91.1 PULMONARY NODULE: ICD-10-CM

## 2024-03-25 DIAGNOSIS — J45.41 MODERATE PERSISTENT ASTHMA WITH ACUTE EXACERBATION (HCC): ICD-10-CM

## 2024-03-25 DIAGNOSIS — E11.42 DIABETIC POLYNEUROPATHY ASSOCIATED WITH TYPE 2 DIABETES MELLITUS (HCC): ICD-10-CM

## 2024-03-25 DIAGNOSIS — J44.9 CHRONIC OBSTRUCTIVE PULMONARY DISEASE, UNSPECIFIED COPD TYPE (HCC): ICD-10-CM

## 2024-03-25 DIAGNOSIS — F17.200 TOBACCO USE DISORDER: ICD-10-CM

## 2024-03-25 DIAGNOSIS — R80.9 CONTROLLED TYPE 2 DIABETES MELLITUS WITH MICROALBUMINURIA, WITHOUT LONG-TERM CURRENT USE OF INSULIN (HCC): Primary | ICD-10-CM

## 2024-03-25 DIAGNOSIS — J30.89 OTHER ALLERGIC RHINITIS: ICD-10-CM

## 2024-03-25 DIAGNOSIS — E11.29 CONTROLLED TYPE 2 DIABETES MELLITUS WITH MICROALBUMINURIA, WITHOUT LONG-TERM CURRENT USE OF INSULIN (HCC): Primary | ICD-10-CM

## 2024-03-25 DIAGNOSIS — R06.2 WHEEZING: ICD-10-CM

## 2024-03-25 RX ORDER — FLUTICASONE PROPIONATE 50 MCG
2 SPRAY, SUSPENSION (ML) NASAL DAILY
Qty: 3 EACH | Refills: 1 | Status: SHIPPED | OUTPATIENT
Start: 2024-03-25 | End: 2024-07-23

## 2024-03-25 RX ORDER — GABAPENTIN 100 MG/1
100 CAPSULE ORAL NIGHTLY
Qty: 90 CAPSULE | Refills: 1 | Status: SHIPPED | OUTPATIENT
Start: 2024-03-25

## 2024-03-25 NOTE — PROGRESS NOTES
Patient ID: Harini Cuadra is a 64 year old female.    HPI  Chief Complaint   Patient presents with    Diabetes     Follow up      Last seen by me on 01/22/2024.    Pt smokes about a pack of cigarettes a day.     Last A1c value was 6.8% done 1/6/2024. Patient denies any chest pain, shortness breath, diaphoresis, dizziness, hypoglycemia. She is taking 2 tablets in the morning and 2 tablets at night of metformin  mg for DM. I provided a referral to ophthalmology.  We went through her other occasions as well and she is compliant.    I reviewed her labs from January 2024 which were normal. Pt will see GI in April 2024. I reviewed her CT chest scan from 2020 which showed a pulmonary nodule. Pt has asthma and COPD. She is taking cetirizine 10 mg for her allergies and atorvastatin 80 mg for cholesterol.  She has not yet made an appointment with pulmonary so I decided to order the CAT scan of the chest myself but still want her to see pulmonary as she clearly has COPD and has some audible rhonchi when she starts laughing.    Sh c/o nerve pain at the top of her feet. States that her pain is worse at night. I provided a referral to podiatry as she states she has not seen one for quite some time.  I told her that is most likely diabetic neuropathy and she states this bothers her more at nighttime.  She is willing to try gabapentin.  There is no open wounds on her feet..       Wt Readings from Last 6 Encounters:   03/25/24 205 lb   03/11/24 205 lb   01/22/24 206 lb 6.4 oz   01/02/24 205 lb   12/26/23 204 lb   11/20/23 204 lb       BMI Readings from Last 6 Encounters:   03/25/24 30.27 kg/m²   03/11/24 32.11 kg/m²   01/22/24 30.48 kg/m²   01/02/24 30.27 kg/m²   12/26/23 30.13 kg/m²   11/20/23 30.13 kg/m²       BP Readings from Last 6 Encounters:   03/25/24 116/77   03/11/24 121/71   01/22/24 100/67   01/02/24 108/72   11/20/23 125/67   11/16/23 123/69         Review of Systems   Respiratory:  Positive for wheezing.  Negative for shortness of breath.    Cardiovascular:  Negative for chest pain.           Medical History:      Past Medical History:   Diagnosis Date    Acute carpal tunnel syndrome of right wrist 2021    Right Endoscopic Carpal Tunnel Release     Anxiety state, unspecified     Arthritis     Asthma (HCC)     Back problem     Borderline diabetes     Colon polyps     Decorative tattoo     Depression     Diabetes (HCC)     Dysplastic nevus     right posterior shoulder - mild atypia    Dysplastic nevus     mid back - moderate atypia    Esophageal reflux     High cholesterol     Lentiginous compound nevus 2019    lower lateral back    Malignant melanoma of upper back (HCC) 10/18/2018    Visual impairment        Past Surgical History:   Procedure Laterality Date    BIOPSY OF SKIN LESION Right 2018    melanoma upper back    BREAST BIOPSY Right 1987    skin cyst    BREAST BIOPSY Left 2018    skin cyst    COLONOSCOPY  10/02/2015    EXCIS BARTHOLIN GLAND/CYST Left 2012    HEMORRHOIDECTOMY      PPH    NEBULIZER, ULTRASONIC  10/25/2019      1993    WRIST ARTHROSCOP,RELEASE XVERS LIG Right 2021    Right Endoscopic Carpal Tunnel Release          Current Outpatient Medications   Medication Sig Dispense Refill    metFORMIN  MG Oral Tablet 24 Hr TAKE 2 TABLETS IN THE MORNING AND TAKE 2 TABLETS IN THE EVENING, TAKE WITH MEALS 360 tablet 3    fluticasone-salmeterol (WIXELA INHUB) 250-50 MCG/ACT Inhalation Aerosol Powder, Breath Activated Inhale 1 puff into the lungs every 12 (twelve) hours. 3 each 2    STEGLATRO 15 MG Oral Tab tablet Take 1 tablet (15 mg total) by mouth every morning. 90 tablet 3    atorvastatin 80 MG Oral Tab Take 1 tablet (80 mg total) by mouth nightly. 90 tablet 3    venlafaxine  MG Oral Tablet 24 Hr Take 1 tablet (225 mg total) by mouth every morning. 90 tablet 3    albuterol 108 (90 Base) MCG/ACT Inhalation Aero Soln Inhale 2 puffs into the lungs 4 (four)  times daily as needed for Wheezing. 25.5 g 3    cetirizine 10 MG Oral Tab Take 1 tablet (10 mg total) by mouth daily. 90 tablet 3    pantoprazole 40 MG Oral Tab EC Take 1 tablet (40 mg total) by mouth daily. 90 tablet 3    CRANBERRY OR Take by mouth.      ONETOUCH ULTRA In Vitro Strip USE BID AND PRN      OneTouch Delica Lancets 30G Does not apply Misc USE BID AND PRN      Potassium (POTASSIMIN OR) Take by mouth.      Multiple Vitamins-Minerals (MULTIVITAMIN ADULT OR) Take by mouth.      Coenzyme Q10 (CO Q 10 OR) Take by mouth.      Misc Natural Products (GLUCOSAMINE CHOND COMPLEX/MSM OR) Take by mouth.      Docusate Sodium (STOOL SOFTENER OR) Take by mouth. (Patient not taking: Reported on 1/26/2024)       Allergies:No Known Allergies     Physical Exam:       Physical Exam  Blood pressure 116/77, pulse 82, temperature 96.6 °F (35.9 °C), height 5' 9\" (1.753 m), weight 205 lb, not currently breastfeeding.      Physical Exam   Constitutional: Patient is oriented to person, place, and time. Patient appears well-developed and well-nourished. No distress.   Head: Normocephalic.   Right Ear: Tympanic membrane, external ear and ear canal normal.   Left Ear: Tympanic membrane, external ear and ear canal normal.   Nose: No rhinorrhea. Thin nostrils with swollen, pale turbinates.   Throat: Oropharynx is clear without exudate   Eyes: Conjunctivae and EOM are normal.   Neck: Normal range of motion. No thyromegaly present.   Cardiovascular: Normal rate, regular rhythm and normal heart sounds.    Pulmonary/Chest: Effort normal. No respiratory distress. Scant inspiratory and expiratory wheezing but otherwise speaks clearly.  Air entry was still good.  Lymphadenopathy: Patient has no cervical adenopathy.  Neurological: Patient is alert and oriented to person, place, and time.   Skin: Skin is warm.   Psychiatry: Normal mood and affect.  Lower legs: No edema of the legs bilaterally. 2+ pedal pulses.        Vitals reviewed.            Assessment/Plan:      Diagnoses and all orders for this visit:    Controlled type 2 diabetes mellitus with microalbuminuria, without long-term current use of insulin ([facility])  -     OPHTHALMOLOGY - INTERNAL  We printed out the referral to the eye doctor  Type 2 diabetes mellitus without retinopathy (HCC)  As above  Moderate persistent asthma with acute exacerbation (HCC)  -     CT CHEST (CPT=71250); Future  CT of the chest as she continues to smoke and has pulmonary nodules and wheezing along with COPD  Other allergic rhinitis  -     fluticasone propionate 50 MCG/ACT Nasal Suspension; 2 sprays by Nasal route daily. Squeeze nose after sprays.  Do not snort into the back of the throat.  Also complains of a runny nose and congested nose usually every morning.  Also itchy ears.  We are going to start her on Flonase.  She can continue the cetirizine  Diabetic polyneuropathy associated with type 2 diabetes mellitus (HCC)  -     gabapentin 100 MG Oral Cap; Take 1 capsule (100 mg total) by mouth nightly. For diabetic neuropathy of the feet.  -     PODIATRY - INTERNAL  Add gabapentin and lets have her see podiatry  Chronic obstructive pulmonary disease, unspecified COPD type (HCC)  -     CT CHEST (CPT=71250); Future    Pulmonary nodule  -     CT CHEST (CPT=71250); Future    Tobacco use disorder  -     CT CHEST (CPT=71250); Future  She is try to quit smoking and I encouraged her to try again.  Wheezing  -     CT CHEST (CPT=71250); Future  She has her inhalers at home.      Referrals (if applicable)  Orders Placed This Encounter   Procedures    PODIATRY - INTERNAL     Referral Priority:   Routine     Referral Type:   OFFICE VISIT     Referred to Provider:   Colleen Capps DPM     Requested Specialty:   PODIATRIST     Number of Visits Requested:   3    OPHTHALMOLOGY - INTERNAL     Phone number [phone].     Referral Priority:   Routine     Referral Type:   OFFICE VISIT     Referred to Provider:   Jed Muniz,  MD     Requested Specialty:   OPHTHALMOLOGY     Number of Visits Requested:   2       Follow up if symptoms persist.  Take medicine (if given) as prescribed.  Approach to treatment discussed and patient/family member understands and agrees to plan.     No follow-ups on file.    There are no Patient Instructions on file for this visit.    Kushal Cannon    3/25/2024    By signing my name below, IKushal,  attest that this documentation has been prepared under the direction and in the presence of Juanito Dias DO.   Electronically Signed: Kushal Cannon, 3/25/2024, 5:00 PM.    I, Juanito Dias DO,  personally performed the services described in this documentation. All medical record entries made by the scribe were at my direction and in my presence.  I have reviewed the chart and discharge instructions (if applicable) and agree that the record reflects my personal performance and is accurate and complete.  Juanito Dias DO, 3/25/2024, 5:37 PM

## 2024-04-10 DIAGNOSIS — J30.89 OTHER ALLERGIC RHINITIS: ICD-10-CM

## 2024-04-10 DIAGNOSIS — K27.9 PUD (PEPTIC ULCER DISEASE): ICD-10-CM

## 2024-04-11 RX ORDER — PANTOPRAZOLE SODIUM 40 MG/1
40 TABLET, DELAYED RELEASE ORAL DAILY
Qty: 90 TABLET | Refills: 3 | Status: SHIPPED | OUTPATIENT
Start: 2024-04-11

## 2024-04-11 RX ORDER — CETIRIZINE HYDROCHLORIDE 10 MG/1
10 TABLET ORAL DAILY
Qty: 90 TABLET | Refills: 3 | Status: SHIPPED | OUTPATIENT
Start: 2024-04-11

## 2024-04-11 NOTE — TELEPHONE ENCOUNTER
Refill passed per [facility] protocols.    Requested Prescriptions   Pending Prescriptions Disp Refills    CETIRIZINE 10 MG Oral Tab [Pharmacy Med Name: CETIRIZINE TABS-OTC 10MG] 90 tablet 3     Sig: TAKE 1 TABLET DAILY       Allergy Medication Protocol Passed - 4/10/2024 12:10 AM        Passed - In person appointment or virtual visit in the past 12 mos or appointment in next 3 mos     Recent Outpatient Visits              2 weeks ago Controlled type 2 diabetes mellitus with microalbuminuria, without long-term current use of insulin (HCC)    [facility]    Office Visit    1 month ago Encounter for gynecological examination without abnormal finding    [facility] - OB/GYN Shaunna Yeung MD    Office Visit    2 months ago Multiple nevi    [facility] Gladys Hurd MD    Office Visit    2 months ago Trigger middle finger of right hand    [facility] Juanito Dias,     Office Visit    3 months ago Adult general medical exam    [facility] Juanito Dias, DO    Office Visit          Future Appointments         Provider Department Appt Notes    In 2 weeks WALT FRANK [facility] colon w/mac @[facility]    In 5 months Gladys Hurd MD [facility] full body                      PANTOPRAZOLE 40 MG Oral Tab EC [Pharmacy Med Name: PANTOPRAZOLE SODIUM DR TABS 40MG] 90 tablet 3     Sig: TAKE 1 TABLET DAILY       Gastrointestional Medication Protocol Passed - 4/10/2024 12:10 AM        Passed - In person appointment or virtual visit in the past 12 mos or appointment in next 3 mos     Recent Outpatient Visits              2 weeks ago Controlled type 2 diabetes mellitus with microalbuminuria, without long-term  current use of insulin [facility]     [facility] Visit    1 month ago Encounter for gynecological examination without abnormal finding    [facility] - OB[facility] Shaunna Yeung MD    Office Visit    2 months ago Multiple nevi    [facility] Gladys Hurd MD    Office Visit    2 months ago Trigger middle finger of right hand    [facility]     [facility] Visit    3 months ago Adult general medical exam    [facility] Juanito Dias,     Office Visit          Future Appointments         Provider Department Appt Notes    In 2 weeks WALT FRANK [facility] colon w/alisia @[facility]    In 5 months Gladys Hurd MD [facility] full body

## 2024-04-16 NOTE — TELEPHONE ENCOUNTER
Please review. Protocol Failed; No Protocol   Last office visit: 3/25/2024    Requested Prescriptions   Pending Prescriptions Disp Refills    ALBUTEROL 108 (90 Base) MCG/ACT Inhalation Aero Soln [Pharmacy Med Name: ALBUTEROL HFA INHALER 8.5GM 90MCG] 25.5 g 3     Sig: USE 2 INHALATIONS FOUR TIMES A DAY AS NEEDED FOR WHEEZING       Asthma & COPD Medication Protocol Failed - 4/15/2024 12:51 AM        Failed - Asthma Action Score greater than or equal to 20        Failed - AAP/ACT given in last 12 months     No data recorded  No data recorded  No data recorded  No data recorded          Passed - Appointment in past 6 or next 3 months      Recent Outpatient Visits              3 weeks ago Controlled type 2 diabetes mellitus with microalbuminuria, without long-term current use of insulin ([facility]     Office Visit    1 month ago Encounter for gynecological examination without abnormal finding    [facility] - [facility] Shaunna Yeung MD    Office Visit    2 months ago [facility] nevi    [facility]    Office Visit    2 months ago [facility]igger middle finger of right hand    [facility]     [facility]    3 months ago Adult general medical exam    [facility] Juanito Dias,     Office Visit          Future Appointments         Provider Department Appt Notes    In 1 week [facility]    In 5 months [facility]urd [facility]                           Future Appointments         Provider Department Appt Notes    In 1 week WALT FRANK [facility] colon w/mac @[facility]    In 5 months Vickey  [facility]          Recent Outpatient Visits              3 weeks ago Controlled type 2 diabetes mellitus with microalbuminuria, without long-term current use of insulin (HCC)    [facility]     [facility] Visit    1 month ago Encounter for gynecological examination without abnormal finding    [facility]    Office Visit    2 months ago [facility] nevi    [facility]    Office Visit    2 months ago Trigger middle finger of right hand    [facility]     Office Visit    3 months ago [facility] general [facility] exam    [facility]     [facility] Visit

## 2024-04-17 RX ORDER — ALBUTEROL SULFATE 90 UG/1
2 AEROSOL, METERED RESPIRATORY (INHALATION) 4 TIMES DAILY PRN
Qty: 25.5 G | Refills: 3 | Status: SHIPPED | OUTPATIENT
Start: 2024-04-17

## 2024-04-19 ENCOUNTER — HOSPITAL ENCOUNTER (OUTPATIENT)
Dept: MAMMOGRAPHY | Age: 64
Discharge: HOME | End: 2024-04-19
Attending: FAMILY MEDICINE
Payer: COMMERCIAL

## 2024-04-19 DIAGNOSIS — Z12.31 VISIT FOR SCREENING MAMMOGRAM: ICD-10-CM

## 2024-04-19 PROCEDURE — 77063 BREAST TOMOSYNTHESIS BI: CPT | Performed by: FAMILY MEDICINE

## 2024-04-19 PROCEDURE — 77067 SCR MAMMO BI INCL CAD: CPT | Performed by: FAMILY MEDICINE

## 2024-04-22 NOTE — PAT NURSING NOTE
Patient instructed to hold steglatro four days prior to procedure per the Pre-surgical testing policy. Patient made aware to withhold metformin the morning of procedure and to withhold vitamins/supplements seven days prior to procedure. Date, location and time of arrival confirmed with patient.

## 2024-04-24 ENCOUNTER — HOSPITAL ENCOUNTER (OUTPATIENT)
Dept: CT IMAGING | Facility: HOSPITAL | Age: 64
Discharge: HOME | End: 2024-04-24
Attending: FAMILY MEDICINE
Payer: COMMERCIAL

## 2024-04-24 ENCOUNTER — HOSPITAL ENCOUNTER (OUTPATIENT)
Dept: MAMMOGRAPHY | Facility: HOSPITAL | Age: 64
Discharge: HOME | End: 2024-04-24
Attending: FAMILY MEDICINE
Payer: COMMERCIAL

## 2024-04-24 DIAGNOSIS — R91.1 PULMONARY NODULE: ICD-10-CM

## 2024-04-24 DIAGNOSIS — F17.200 TOBACCO USE DISORDER: ICD-10-CM

## 2024-04-24 DIAGNOSIS — R06.2 WHEEZING: ICD-10-CM

## 2024-04-24 DIAGNOSIS — J44.9 CHRONIC OBSTRUCTIVE PULMONARY DISEASE, UNSPECIFIED COPD TYPE (HCC): ICD-10-CM

## 2024-04-24 DIAGNOSIS — R92.8 ABNORMALITY OF RIGHT BREAST ON SCREENING MAMMOGRAM: Primary | ICD-10-CM

## 2024-04-24 DIAGNOSIS — J45.41 MODERATE PERSISTENT ASTHMA WITH ACUTE EXACERBATION (HCC): ICD-10-CM

## 2024-04-24 DIAGNOSIS — R92.8 ABNORMAL MAMMOGRAM: ICD-10-CM

## 2024-04-24 PROCEDURE — 77065 DX MAMMO INCL CAD UNI: CPT | Performed by: FAMILY MEDICINE

## 2024-04-24 PROCEDURE — 77061 BREAST TOMOSYNTHESIS UNI: CPT | Performed by: FAMILY MEDICINE

## 2024-04-24 PROCEDURE — 71250 CT THORAX DX C-: CPT | Performed by: FAMILY MEDICINE

## 2024-04-25 ENCOUNTER — TELEPHONE (OUTPATIENT)
Dept: FAMILY MEDICINE CLINIC | Facility: CLINIC | Age: 64
End: 2024-04-25

## 2024-04-25 DIAGNOSIS — E11.9 TYPE 2 DIABETES MELLITUS WITHOUT RETINOPATHY (HCC): ICD-10-CM

## 2024-04-25 DIAGNOSIS — E78.2 MIXED HYPERLIPIDEMIA: Primary | ICD-10-CM

## 2024-04-25 NOTE — TELEPHONE ENCOUNTER
[facility] Triage Support  FYI: talked to patient appointment made on 06/17/2024 but would like to know if Dr Dias will send her blood work prior to her appointment. Please advise.

## 2024-04-29 ENCOUNTER — ANESTHESIA EVENT (OUTPATIENT)
Dept: ENDOSCOPY | Age: 64
End: 2024-04-29
Payer: COMMERCIAL

## 2024-04-29 ENCOUNTER — ANESTHESIA (OUTPATIENT)
Dept: ENDOSCOPY | Age: 64
End: 2024-04-29
Payer: COMMERCIAL

## 2024-04-29 ENCOUNTER — HOSPITAL ENCOUNTER (OUTPATIENT)
Age: 64
Setting detail: HOSPITAL OUTPATIENT SURGERY
Discharge: HOME | End: 2024-04-29
Attending: INTERNAL MEDICINE | Admitting: INTERNAL MEDICINE
Payer: COMMERCIAL

## 2024-04-29 VITALS
DIASTOLIC BLOOD PRESSURE: 64 MMHG | RESPIRATION RATE: 17 BRPM | BODY MASS INDEX: 32.33 KG/M2 | HEIGHT: 67 IN | SYSTOLIC BLOOD PRESSURE: 133 MMHG | WEIGHT: 206 LBS | HEART RATE: 65 BPM | OXYGEN SATURATION: 99 %

## 2024-04-29 DIAGNOSIS — Z12.11 COLON CANCER SCREENING: ICD-10-CM

## 2024-04-29 DIAGNOSIS — Z86.010 PERSONAL HISTORY OF COLONIC POLYPS: ICD-10-CM

## 2024-04-29 LAB — GLUCOSE BLDC GLUCOMTR-MCNC: 111 MG/DL (ref 70–99)

## 2024-04-29 PROCEDURE — 82962 GLUCOSE BLOOD TEST: CPT

## 2024-04-29 PROCEDURE — 88305 TISSUE EXAM BY PATHOLOGIST: CPT | Performed by: INTERNAL MEDICINE

## 2024-04-29 DEVICE — REPLAY HEMOSTASIS CLIP, 11MM SPAN
Type: IMPLANTABLE DEVICE | Site: COLON | Status: FUNCTIONAL
Brand: REPLAY

## 2024-04-29 RX ORDER — SODIUM CHLORIDE, SODIUM LACTATE, POTASSIUM CHLORIDE, CALCIUM CHLORIDE 600; 310; 30; 20 MG/100ML; MG/100ML; MG/100ML; MG/100ML
INJECTION, SOLUTION INTRAVENOUS CONTINUOUS
Status: DISCONTINUED | OUTPATIENT
Start: 2024-04-29 | End: 2024-04-29

## 2024-04-29 RX ORDER — LIDOCAINE HYDROCHLORIDE 10 MG/ML
INJECTION, SOLUTION EPIDURAL; INFILTRATION; INTRACAUDAL; PERINEURAL AS NEEDED
Status: DISCONTINUED | OUTPATIENT
Start: 2024-04-29 | End: 2024-04-29 | Stop reason: SURG

## 2024-04-29 RX ADMIN — LIDOCAINE HYDROCHLORIDE 50 MG: 10 INJECTION, SOLUTION EPIDURAL; INFILTRATION; INTRACAUDAL; PERINEURAL at 10:49:00

## 2024-04-29 NOTE — H&P
History & Physical Examination    Patient Name: Harini Cuadra  MRN: [id number]  [facility]: [id number]  YOB: 1960    Diagnosis:   Colon cancer screening  Hx colon polyps      Medications Prior to Admission   Medication Sig Dispense Refill Last Dose    albuterol 108 (90 Base) MCG/ACT Inhalation Aero Soln Inhale 2 puffs into the lungs 4 (four) times daily as needed for Wheezing. 25.5 g 3     cetirizine 10 MG Oral Tab Take 1 tablet (10 mg total) by mouth daily. 90 tablet 3 4/27/2024    pantoprazole 40 MG Oral Tab EC Take 1 tablet (40 mg total) by mouth daily. 90 tablet 3 4/27/2024    fluticasone propionate 50 MCG/ACT Nasal Suspension 2 sprays by Nasal route daily. Squeeze nose after sprays.  Do not snort into the back of the throat. 3 each 1     metFORMIN  MG Oral Tablet 24 Hr TAKE 2 TABLETS IN THE MORNING AND TAKE 2 TABLETS IN THE EVENING, TAKE WITH MEALS 360 tablet 3 4/28/2024    fluticasone-salmeterol (WIXELA INHUB) 250-50 MCG/ACT Inhalation Aerosol Powder, Breath Activated Inhale 1 puff into the lungs every 12 (twelve) hours. 3 each 2 4/28/2024    STEGLATRO 15 MG Oral Tab tablet Take 1 tablet (15 mg total) by mouth every morning. 90 tablet 3 4/25/2024    atorvastatin 80 MG Oral Tab Take 1 tablet (80 mg total) by mouth nightly. 90 tablet 3 4/28/2024    venlafaxine  MG Oral Tablet 24 Hr Take 1 tablet (225 mg total) by mouth every morning. 90 tablet 3 4/28/2024    CRANBERRY OR Take by mouth.   4/22/2024    Potassium (POTASSIMIN OR) Take by mouth.   4/22/2024    Multiple Vitamins-Minerals (MULTIVITAMIN ADULT OR) Take by mouth.   4/22/2024    Coenzyme Q10 (CO Q 10 OR) Take by mouth.   4/22/2024    Misc Natural Products (GLUCOSAMINE CHOND COMPLEX/MSM OR) Take by mouth.       Docusate Sodium (STOOL SOFTENER OR) Take by mouth.       gabapentin 100 MG Oral Cap Take 1 capsule (100 mg total) by mouth nightly. For diabetic neuropathy of the feet. 90 capsule 1 4/22/2024    ONETOUCH ULTRA In Vitro Strip USE  BID AND PRN       OneTouch Delica Lancets 30G Does not apply Misc USE BID AND PRN   2024     Current Facility-Administered Medications   Medication Dose Route Frequency    lactated ringers infusion   Intravenous Continuous       Allergies: No Known Allergies    Past Medical History:    Acute carpal tunnel syndrome of right wrist    Right Endoscopic Carpal Tunnel Release     Anxiety state, unspecified    Arthritis    Asthma (HCC)    Back problem    Borderline diabetes    Colon polyps    Decorative tattoo    Depression    Diabetes (HCC)    Dysplastic nevus    right posterior shoulder - mild atypia    Dysplastic nevus    mid back - moderate atypia    Esophageal reflux    High cholesterol    Lentiginous compound nevus    lower lateral back    Malignant melanoma of upper back (HCC)    Visual impairment     Past Surgical History:   Procedure Laterality Date    Biopsy of skin lesion Right 2018    melanoma upper back    Breast biopsy Right     skin cyst    Breast biopsy Left     skin cyst    Colonoscopy  10/02/2015    Excis bartholin gland/cyst Left 2012    Hemorrhoidectomy  2012    PPH    Raquel localization wire 1 site left (cpt=19281)      cyst removal    Raquel localization wire 1 site right (cpt=19281)      cyst removed    Nebulizer, ultrasonic  10/25/2019      1993    Wrist arthroscop,release xvers lig Right 2021    Right Endoscopic Carpal Tunnel Release     Family History   Problem Relation Age of Onset    Melanoma Self     Hypertension Mother     Other (Other) Mother         a fib    Bipolar Disorder Brother     Other (Other) Brother         Diverticulitis    Blindness Brother     Other (Other) Brother         Deaf    Cancer Son         malignant melanoma    Cancer Maternal Grandmother         ?liver    Diabetes Neg     Glaucoma Neg     Macular degeneration Neg      Social History     Tobacco Use    Smoking status: Every Day     Current packs/day: 0.00     Average packs/day: 1 pack/day  for 40.0 years (40.0 ttl pk-yrs)     Types: Cigarettes     Start date: 10/10/1980     Last attempt to quit: 10/10/2020     Years since quitting: 3.5    Smokeless tobacco: Former    Tobacco comments:     About a pack a day per patient   Substance Use Topics    Alcohol use: Not Currently     Comment: rarely       SYSTEM Check if Review is Normal Check if Physical Exam is Normal If not normal, please explain:   HEENT [x ] [ x]    NECK & BACK [x ] [x ]    HEART [x ] [ x]    LUNGS [x ] [ x]    ABDOMEN [x ] [x ]    UROGENITAL [ ] [ ]    EXTREMITIES [x ] [x ]    OTHER        [ x ] I have discussed the risks and benefits and alternatives with the patient/family.  They understand and agree to proceed with plan of care.  [ x ] I have reviewed the History and Physical done within the last 30 days.  Any changes noted above.    Jose Maria Cannon MD  4/29/2024  10:40 AM

## 2024-04-29 NOTE — OPERATIVE REPORT
[facility] Endoscopy Report  Date of procedure-April 29, 2024    Preoperative Diagnosis:  -Colorectal cancer screening  -History colon polyps      Postoperative Diagnosis:  -Colon polyps x 3  -Diverticulosis  -Internal hemorrhoids      Procedure:    Colonoscopy       Surgeon:  Jose Maria Cannon M.D.    Anesthesia:  MAC     Technique:  After informed consent, the patient was placed in the left lateral recumbent position.  Digital rectal examination revealed no palpable intraluminal abnormalities.  An Olympus variable stiffness 190 series HD colonoscope was inserted into the rectum and advanced under direct vision by following the lumen to the cecum.  The colon was examined upon withdrawal in the left lateral position.    The procedure was well tolerated without immediate complication.      Findings:  The preparation of the colon was good.  The terminal ileum was examined for 4 cm and visually normal.  The ileocecal valve was well preserved. The visualized colonic mucosa from the cecum to the anal verge was normal with an intact vascular pattern.    Colon polyps x 3 removed as follows;  -Ascending x 1, sessile 4 mm in size and cold snare removed.  -Descending x 2, first polyp was sessile 10 mm in size and cold snare removed.  A single Endo Clip was placed across the mucosal defect here.  Smaller sessile 4 mm polyp was also removed from the segment.  All polypectomy sites were inspected and found to be free of bleeding and specimens retrieved and sent for analysis.    Diverticulosis extending from the sigmoid to the transverse colon, no diverticulitis.    Small internal hemorrhoids noted on retroflexed view.    Estimated blood loss-insignificant  Specimens-see above    Impression:  -Colon polyps x 3  -Diverticulosis  -Internal hemorrhoids    Recommendations:  - Post polypectomy instructions given  - Repeat colonoscopy in 3- 5 years  - High fiber diet for diverticular disease  - Symptomatic treatment of  hemorrhoids          Jose Maria Cannon MD  4/29/2024  11:27 AM

## 2024-04-29 NOTE — ANESTHESIA POSTPROCEDURE EVALUATION
Patient: Harini Cuadra    Procedure Summary       Date: 04/29/24 Room / Location: [facility] ENDOSCOPY 01 / [facility] ENDO    Anesthesia Start: 1047 Anesthesia Stop: 1128    Procedure: COLONOSCOPY Diagnosis:       Colon cancer screening      Personal history of colonic polyps      (colon polyps, diverticulosis, hemorrhoids)    Surgeons: Jose Maria Cannon MD Anesthesiologist: Ovidio Solorio MD    Anesthesia Type: MAC ASA Status: 2            Anesthesia Type: MAC    Vitals Value Taken Time   /51 04/29/24 1128   Temp  04/29/24 1129   Pulse 75 04/29/24 1128   Resp 14 04/29/24 1128   SpO2 96 % 04/29/24 1128       [facility] AN Post Evaluation:   Patient Evaluated in PACU  Patient Participation: waiting for patient participation  Pain Management: adequate  Airway Patency:patent  Yes    Nausea/Vomiting: none  Cardiovascular Status: acceptable  Respiratory Status: acceptable and room air      Ovidio Solorio MD  4/29/2024 11:29 AM

## 2024-04-29 NOTE — ANESTHESIA PREPROCEDURE EVALUATION
Anesthesia PreOp Note    HPI:     Harini Cuadra is a 64 year old female who presents for preoperative consultation requested by: Jose Maria Cannon MD    Date of Surgery: 4/29/2024    Procedure(s):  COLONOSCOPY  Indication: Colon cancer screening, Personal history of colonic polyps    Relevant Problems   No relevant active problems       NPO:                         History Review:  Patient Active Problem List    Diagnosis Date Noted    Abnormality of right breast on screening mammogram 04/24/2024    Carpal tunnel syndrome of right wrist 02/14/2023    Type 2 diabetes mellitus without retinopathy (HCC) 10/08/2020    Myopia with presbyopia of both eyes 10/08/2020    Age-related nuclear cataract of both eyes 10/08/2020    Stress incontinence in female 10/30/2019    Diarrhea of presumed infectious origin 10/02/2019    Moderate persistent asthma with acute exacerbation (HCC) 10/02/2019    Residual hemorrhoidal skin tags 06/14/2019    Prediabetes 06/03/2019    External hemorrhoid, bleeding 06/03/2019    Tobacco use disorder 06/03/2019    Depressive disorder 02/23/2018    Severe obesity (BMI 35.0-35.9 with comorbidity) (HCC) 07/03/2017    Mixed hyperlipidemia 01/22/2016    Insomnia 01/22/2016    Anxiety 01/22/2016    PUD (peptic ulcer disease) 01/22/2016       Past Medical History:    Acute carpal tunnel syndrome of right wrist    Right Endoscopic Carpal Tunnel Release     Anxiety state, unspecified    Arthritis    Asthma (HCC)    Back problem    Borderline diabetes    Colon polyps    Decorative tattoo    Depression    Diabetes (HCC)    Dysplastic nevus    right posterior shoulder - mild atypia    Dysplastic nevus    mid back - moderate atypia    Esophageal reflux    High cholesterol    Lentiginous compound nevus    lower lateral back    Malignant melanoma of upper back (HCC)    Visual impairment       Past Surgical History:   Procedure Laterality Date    Biopsy of skin lesion Right 11/08/2018    melanoma upper back     Breast biopsy Right     skin cyst    Breast biopsy Left     skin cyst    Colonoscopy  10/02/2015    Excis bartholin gland/cyst Left 2012    Hemorrhoidectomy      PPH    Raquel localization wire 1 site left (cpt=19281)      cyst removal    Raquel localization wire 1 site right (cpt=19281)      cyst removed    Nebulizer, ultrasonic  10/25/2019          Wrist arthroscop,release xvers lig Right 2021    Right Endoscopic Carpal Tunnel Release       Medications Prior to Admission   Medication Sig Dispense Refill Last Dose    albuterol 108 (90 Base) MCG/ACT Inhalation Aero Soln Inhale 2 puffs into the lungs 4 (four) times daily as needed for Wheezing. 25.5 g 3     cetirizine 10 MG Oral Tab Take 1 tablet (10 mg total) by mouth daily. 90 tablet 3     pantoprazole 40 MG Oral Tab EC Take 1 tablet (40 mg total) by mouth daily. 90 tablet 3     fluticasone propionate 50 MCG/ACT Nasal Suspension 2 sprays by Nasal route daily. Squeeze nose after sprays.  Do not snort into the back of the throat. 3 each 1     metFORMIN  MG Oral Tablet 24 Hr TAKE 2 TABLETS IN THE MORNING AND TAKE 2 TABLETS IN THE EVENING, TAKE WITH MEALS 360 tablet 3     fluticasone-salmeterol (WIXELA INHUB) 250-50 MCG/ACT Inhalation Aerosol Powder, Breath Activated Inhale 1 puff into the lungs every 12 (twelve) hours. 3 each 2     STEGLATRO 15 MG Oral Tab tablet Take 1 tablet (15 mg total) by mouth every morning. 90 tablet 3     atorvastatin 80 MG Oral Tab Take 1 tablet (80 mg total) by mouth nightly. 90 tablet 3     venlafaxine  MG Oral Tablet 24 Hr Take 1 tablet (225 mg total) by mouth every morning. 90 tablet 3     CRANBERRY OR Take by mouth.       Potassium (POTASSIMIN OR) Take by mouth.       Multiple Vitamins-Minerals (MULTIVITAMIN ADULT OR) Take by mouth.       Coenzyme Q10 (CO Q 10 OR) Take by mouth.       Misc Natural Products (GLUCOSAMINE CHOND COMPLEX/MSM OR) Take by mouth.       Docusate Sodium (STOOL SOFTENER OR)  Take by mouth.       gabapentin 100 MG Oral Cap Take 1 capsule (100 mg total) by mouth nightly. For diabetic neuropathy of the feet. 90 capsule 1     ONETOUCH ULTRA In Vitro Strip USE BID AND PRN       OneTouch Delica Lancets 30G Does not apply Misc USE BID AND PRN        Current Facility-Administered Medications Ordered in Epic   Medication Dose Route Frequency Provider Last Rate Last Admin    lactated ringers infusion   Intravenous Continuous Jose Maria Cannon MD         No current [facility]-ordered outpatient medications on file.       No Known Allergies    Family History   Problem Relation Age of Onset    Melanoma Self     Hypertension Mother     Other (Other) Mother         a fib    Bipolar Disorder Brother     Other (Other) Brother         Diverticulitis    Blindness Brother     Other (Other) Brother         Deaf    Cancer Son         malignant melanoma    Cancer Maternal Grandmother         ?liver    Diabetes Neg     Glaucoma Neg     Macular degeneration Neg      Social History     Socioeconomic History    Marital status:     Number of children: 1   Occupational History    Occupation: Office work, purchasing    Occupation: Office work, telephone orders   Tobacco Use    Smoking status: Every Day     Current packs/day: 0.00     Average packs/day: 1 pack/day for 40.0 years (40.0 ttl pk-yrs)     Types: Cigarettes     Start date: 10/10/1980     Last attempt to quit: 10/10/2020     Years since quitting: 3.5    Smokeless tobacco: Former    Tobacco comments:     About a pack a day per patient   Vaping Use    Vaping status: Former   Substance and Sexual Activity    Alcohol use: Not Currently     Comment: rarely    Drug use: No    Sexual activity: Not Currently   Other Topics Concern    Pt has a pacemaker No    Pt has a defibrillator No    Reaction to local anesthetic No    Caffeine Concern No    Right Handed Yes       Available pre-op labs reviewed.             Vital Signs:  Body mass index is 32.26 kg/m².   height  is 1.702 m (5' 7\") and weight is 93.4 kg (206 lb).   Vitals:    04/22/24 1237   Weight: 93.4 kg (206 lb)   Height: 1.702 m (5' 7\")        Anesthesia Evaluation     Patient summary reviewed and Nursing notes reviewed    No history of anesthetic complications   Airway   Mallampati: II  TM distance: >3 FB  Neck ROM: full  Dental          Pulmonary - normal exam   (+) asthma  Cardiovascular - negative ROS and normal exam    Neuro/Psych    (+)  neuromuscular disease, anxiety/panic attacks,  depression      GI/Hepatic/Renal    (+) GERD, bowel prep    Endo/Other    (+) diabetes mellitus  Abdominal                  Anesthesia Plan:   ASA:  2  Plan:   MAC  Plan Comments: I have discussed the anesthetic plan, major risks and alternatives with the patient and answered all questions. The patient desires to proceed with surgery and anesthesia as planned.     Informed Consent Plan and Risks Discussed With:  Patient      I have informed Harini Cuadra and/or legal guardian or family member of the nature of the anesthetic plan, benefits, risks including possible dental damage if relevant, major complications, and any alternative forms of anesthetic management.   All of the patient's questions were answered to the best of my ability. The patient desires the anesthetic management as planned.  Ovidio Solorio MD  4/29/2024 10:08 AM  Present on Admission:  **None**

## 2024-04-29 NOTE — DISCHARGE INSTRUCTIONS
Home Care Instructions for Colonoscopy with Sedation    Diet:  - Resume your regular diet as tolerated unless otherwise instructed.  - Start with light meals to minimize bloating.  - Do not drink alcohol today.    Medication:  - If you have questions about resuming your normal medications, please contact your Primary Care Physician.    Activities:  - Take it easy today. Do not return to work today.  - Do not drive today.  - Do not operate any machinery today (including kitchen equipment).    Colonoscopy:  - You may notice some rectal \"spotting\" (a little blood on the toilet tissue) for a day or two after the exam. This is normal.  - If you experience any rectal bleeding (not spotting), persistent tenderness or sharp severe abdominal pains, oral temperature over 100 degrees Fahrenheit, light-headedness or dizziness, or any other problems, contact your doctor.    **If unable to reach your doctor, please go to the [facility] Emergency Room**    - Your referring physician will receive a full report of your examination.  - If you do not hear from your doctor's office within two weeks of your biopsy, please call them for your results.    You may be able to see your laboratory results in [employer] between 4 and 7 business days.  In some cases, your physician may not have viewed the results before they are released to [employer].  If you have questions regarding your results contact the physician who ordered the test/exam by phone or via [employer] by choosing \"Ask a Medical Question.\"    1 clip placed

## 2024-04-30 ENCOUNTER — TELEPHONE (OUTPATIENT)
Facility: CLINIC | Age: 64
End: 2024-04-30

## 2024-04-30 NOTE — TELEPHONE ENCOUNTER
Patient contacted, verified and results from Dr. Cannon given.    Health maintenance updated.  3 year colonoscopy recall placed in patient outreach.Next due on 2027 per Dr. Cannon.

## 2024-04-30 NOTE — TELEPHONE ENCOUNTER
----- Message from Jose Maria Cannon MD sent at 4/30/2024  3:07 PM CDT -----  I wanted to get back to you with your colonoscopy results.  You had 3 colon polyps removed which were benign.  I would advise a repeat colonoscopy in 3 years to make sure no new polyps are forming.      You also have internal hemorrhoids and diverticulosis.  Please stay on a high fiber diet and call with any questions.

## 2024-06-08 ENCOUNTER — LAB ENCOUNTER (OUTPATIENT)
Dept: LAB | Age: 64
End: 2024-06-08
Attending: FAMILY MEDICINE
Payer: COMMERCIAL

## 2024-06-08 DIAGNOSIS — E11.9 TYPE 2 DIABETES MELLITUS WITHOUT RETINOPATHY (HCC): ICD-10-CM

## 2024-06-08 LAB
ANION GAP SERPL CALC-SCNC: 5 MMOL/L (ref 0–18)
BUN BLD-MCNC: 11 MG/DL (ref 9–23)
BUN/CREAT SERPL: 10.3 (ref 10–20)
CALCIUM BLD-MCNC: 9.2 MG/DL (ref 8.7–10.4)
CHLORIDE SERPL-SCNC: 107 MMOL/L (ref 98–112)
CO2 SERPL-SCNC: 29 MMOL/L (ref 21–32)
CREAT BLD-MCNC: 1.07 MG/DL
EGFRCR SERPLBLD CKD-EPI 2021: 59 ML/MIN/1.73M2 (ref 60–?)
EST. AVERAGE GLUCOSE BLD GHB EST-MCNC: 148 MG/DL (ref 68–126)
FASTING STATUS PATIENT QL REPORTED: YES
GLUCOSE BLD-MCNC: 121 MG/DL (ref 70–99)
HBA1C MFR BLD: 6.8 % (ref ?–5.7)
OSMOLALITY SERPL CALC.SUM OF ELEC: 293 MOSM/KG (ref 275–295)
POTASSIUM SERPL-SCNC: 4.3 MMOL/L (ref 3.5–5.1)
SODIUM SERPL-SCNC: 141 MMOL/L (ref 136–145)

## 2024-06-08 PROCEDURE — 36415 COLL VENOUS BLD VENIPUNCTURE: CPT

## 2024-06-08 PROCEDURE — 80048 BASIC METABOLIC PNL TOTAL CA: CPT

## 2024-06-08 PROCEDURE — 83036 HEMOGLOBIN GLYCOSYLATED A1C: CPT

## 2024-06-17 ENCOUNTER — OFFICE VISIT (OUTPATIENT)
Dept: FAMILY MEDICINE CLINIC | Facility: CLINIC | Age: 64
End: 2024-06-17
Payer: COMMERCIAL

## 2024-06-17 VITALS
BODY MASS INDEX: 32 KG/M2 | HEART RATE: 98 BPM | TEMPERATURE: 97 F | SYSTOLIC BLOOD PRESSURE: 109 MMHG | DIASTOLIC BLOOD PRESSURE: 69 MMHG | WEIGHT: 204 LBS

## 2024-06-17 DIAGNOSIS — E11.42 DIABETIC POLYNEUROPATHY ASSOCIATED WITH TYPE 2 DIABETES MELLITUS (HCC): ICD-10-CM

## 2024-06-17 DIAGNOSIS — R80.9 CONTROLLED TYPE 2 DIABETES MELLITUS WITH MICROALBUMINURIA, WITHOUT LONG-TERM CURRENT USE OF INSULIN (HCC): Primary | ICD-10-CM

## 2024-06-17 DIAGNOSIS — F41.9 ANXIETY: ICD-10-CM

## 2024-06-17 DIAGNOSIS — J44.9 CHRONIC OBSTRUCTIVE PULMONARY DISEASE, UNSPECIFIED COPD TYPE (HCC): ICD-10-CM

## 2024-06-17 DIAGNOSIS — R91.1 PULMONARY NODULE, RIGHT: ICD-10-CM

## 2024-06-17 DIAGNOSIS — F17.200 TOBACCO USE DISORDER: ICD-10-CM

## 2024-06-17 DIAGNOSIS — K27.9 PUD (PEPTIC ULCER DISEASE): ICD-10-CM

## 2024-06-17 DIAGNOSIS — E11.29 CONTROLLED TYPE 2 DIABETES MELLITUS WITH MICROALBUMINURIA, WITHOUT LONG-TERM CURRENT USE OF INSULIN (HCC): Primary | ICD-10-CM

## 2024-06-17 DIAGNOSIS — E11.9 TYPE 2 DIABETES MELLITUS WITHOUT RETINOPATHY (HCC): ICD-10-CM

## 2024-06-17 DIAGNOSIS — E78.2 MIXED HYPERLIPIDEMIA: ICD-10-CM

## 2024-06-17 PROCEDURE — 99215 OFFICE O/P EST HI 40 MIN: CPT | Performed by: FAMILY MEDICINE

## 2024-06-17 RX ORDER — GABAPENTIN 100 MG/1
100 CAPSULE ORAL NIGHTLY
Qty: 90 CAPSULE | Refills: 1 | Status: SHIPPED | OUTPATIENT
Start: 2024-06-17

## 2024-06-17 RX ORDER — PANTOPRAZOLE SODIUM 40 MG/1
40 TABLET, DELAYED RELEASE ORAL DAILY
Qty: 90 TABLET | Refills: 3 | Status: SHIPPED | OUTPATIENT
Start: 2024-06-17

## 2024-06-17 RX ORDER — ATORVASTATIN CALCIUM 80 MG/1
80 TABLET, FILM COATED ORAL NIGHTLY
Qty: 90 TABLET | Refills: 3 | Status: SHIPPED | OUTPATIENT
Start: 2024-06-17

## 2024-06-17 RX ORDER — VENLAFAXINE HYDROCHLORIDE 225 MG/1
225 TABLET, EXTENDED RELEASE ORAL EVERY MORNING
Qty: 90 TABLET | Refills: 3 | Status: SHIPPED | OUTPATIENT
Start: 2024-06-17

## 2024-06-17 RX ORDER — FLUTICASONE PROPIONATE AND SALMETEROL 250; 50 UG/1; UG/1
1 POWDER RESPIRATORY (INHALATION) EVERY 12 HOURS SCHEDULED
Qty: 3 EACH | Refills: 2 | Status: SHIPPED | OUTPATIENT
Start: 2024-06-17

## 2024-06-17 RX ORDER — ALPRAZOLAM 0.25 MG/1
0.25 TABLET ORAL DAILY PRN
Qty: 20 TABLET | Refills: 0 | Status: SHIPPED | OUTPATIENT
Start: 2024-06-17

## 2024-06-17 RX ORDER — ERTUGLIFLOZIN 15 MG/1
15 TABLET, FILM COATED ORAL EVERY MORNING
Qty: 90 TABLET | Refills: 3 | Status: SHIPPED | OUTPATIENT
Start: 2024-06-17

## 2024-06-17 NOTE — PATIENT INSTRUCTIONS
Mammogram follow-up order has been ordered.  Do this around October 24, 2024 and then the CAT scan of the chest has been ordered for you and you should do that around December 17, 2024.

## 2024-06-17 NOTE — PROGRESS NOTES
Patient ID: Harini Cuadra is a 64 year old female.    HPI  Chief Complaint   Patient presents with    Diabetes     Follow up      Hemoglobin A1c is still very good at 6.8 that was done on June 8, 2024.  Creatinine was minimally elevated but nothing worrisome.  In January her hemoglobin A1c was also 6.8.  She states she stopped drinking soda but has been drinking some sweet tea to keep hydrated.    Patient denies any chest pain, shortness breath, diaphoresis, dizziness, hypoglycemia.  She states she gets some very minimal intermittent tingling in the feet.    She is trying to quit tobacco but with the stress that she has in her life it is hard to do so.  She states that she had been quite anxious with all the cicadas around and also sometimes gets anxious at work due to the stress and would like to have some Xanax just to take periodically.  She wants to review the CAT scan of her chest due to the pulmonary nodule and emphysema along with the 6-month mammogram that needs to be done in October due to most likely benign findings.    Wt Readings from Last 6 Encounters:   06/17/24 204 lb (92.5 kg)   04/29/24 206 lb (93.4 kg)   03/25/24 205 lb (93 kg)   03/11/24 205 lb (93 kg)   01/22/24 206 lb 6.4 oz (93.6 kg)   01/02/24 205 lb (93 kg)       BMI Readings from Last 6 Encounters:   06/17/24 31.95 kg/m²   04/29/24 32.26 kg/m²   03/25/24 30.27 kg/m²   03/11/24 32.11 kg/m²   01/22/24 30.48 kg/m²   01/02/24 30.27 kg/m²       BP Readings from Last 6 Encounters:   06/17/24 109/69   04/29/24 133/64   03/25/24 116/77   03/11/24 121/71   01/22/24 100/67   01/02/24 108/72         Review of Systems  No chest pain.  Minimal wheezing.  No fevers.  Rest as above.      Medical History:      Past Medical History:    Acute carpal tunnel syndrome of right wrist    Right Endoscopic Carpal Tunnel Release     Anxiety state, unspecified    Arthritis    Asthma (HCC)    Back problem    Borderline diabetes    Colon polyps    Decorative tattoo     Depression    Diabetes (HCC)    Dysplastic nevus    right posterior shoulder - mild atypia    Dysplastic nevus    mid back - moderate atypia    Esophageal reflux    High cholesterol    Lentiginous compound nevus    lower lateral back    Malignant melanoma of upper back (HCC)    Visual impairment       Past Surgical History:   Procedure Laterality Date    Biopsy of skin lesion Right 2018    melanoma upper back    Breast biopsy Right     skin cyst    Breast biopsy Left 2018    skin cyst    Colonoscopy  10/02/2015    Colonoscopy  2024    Dr. Cannon; colon polyps, diverticulosis, hemorrhoids    Colonoscopy N/A 2024    Procedure: COLONOSCOPY;  Surgeon: Jose Maria Cannon MD;  Location: [facility] ENDO    Excis bartholin gland/cyst Left 2012    Hemorrhoidectomy      PPH    Raquel localization wire 1 site left (cpt=19281)      cyst removal    Raquel localization wire 1 site right (cpt=19281)      cyst removed    Nebulizer, ultrasonic  10/25/2019          Wrist arthroscop,release xvers lig Right 2021    Right Endoscopic Carpal Tunnel Release          Current Outpatient Medications   Medication Sig Dispense Refill    albuterol 108 (90 Base) MCG/ACT Inhalation Aero Soln Inhale 2 puffs into the lungs 4 (four) times daily as needed for Wheezing. 25.5 g 3    cetirizine 10 MG Oral Tab Take 1 tablet (10 mg total) by mouth daily. 90 tablet 3    pantoprazole 40 MG Oral Tab EC Take 1 tablet (40 mg total) by mouth daily. 90 tablet 3    fluticasone propionate 50 MCG/ACT Nasal Suspension 2 sprays by Nasal route daily. Squeeze nose after sprays.  Do not snort into the back of the throat. 3 each 1    gabapentin 100 MG Oral Cap Take 1 capsule (100 mg total) by mouth nightly. For diabetic neuropathy of the feet. 90 capsule 1    metFORMIN  MG Oral Tablet 24 Hr TAKE 2 TABLETS IN THE MORNING AND TAKE 2 TABLETS IN THE EVENING, TAKE WITH MEALS 360 tablet 3    fluticasone-salmeterol (WIXELA INHUB) 250-50  MCG/ACT Inhalation Aerosol Powder, Breath Activated Inhale 1 puff into the lungs every 12 (twelve) hours. 3 each 2    STEGLATRO 15 MG Oral Tab tablet Take 1 tablet (15 mg total) by mouth every morning. 90 tablet 3    atorvastatin 80 MG Oral Tab Take 1 tablet (80 mg total) by mouth nightly. 90 tablet 3    venlafaxine  MG Oral Tablet 24 Hr Take 1 tablet (225 mg total) by mouth every morning. 90 tablet 3    CRANBERRY OR Take by mouth.      ONETOUCH ULTRA In Vitro Strip USE BID AND PRN      OneTouch Delica Lancets 30G Does not apply Misc USE BID AND PRN      Potassium (POTASSIMIN OR) Take by mouth.      Multiple Vitamins-Minerals (MULTIVITAMIN ADULT OR) Take by mouth.      Coenzyme Q10 (CO Q 10 OR) Take by mouth.      Misc Natural Products (GLUCOSAMINE CHOND COMPLEX/MSM OR) Take by mouth.      Docusate Sodium (STOOL SOFTENER OR) Take by mouth.       Allergies:No Known Allergies     Physical Exam:       Physical Exam  Blood pressure 109/69, pulse 98, temperature 96.7 °F (35.9 °C), weight 204 lb (92.5 kg), not currently breastfeeding.  Physical Exam   Constitutional: Patient is oriented to person, place, and time. Patient appears well-developed and well-nourished. No distress.   HENT:   Head: Normocephalic and atraumatic.   Neck: Normal range of motion. Neck supple. No thyromegaly present.   Lymphadenopathy:     Has  no cervical adenopathy.   Cardiovascular: Normal rate, regular rhythm and no murmur heard.  Pulmonary/Chest: Effort normal and breath sounds with end inspiratory wheeze in all lung fields but good air entry.  No respiratory distress.   Neurological: Patient is alert and oriented to person, place, and time.   Skin: Skin is warm and dry.  No diaphoresis.  Psychiatric: Patient has a normal mood and affect.   Trace pretibial edema bilateral legs.  No cellulitis of the legs.  There is no ulcerations.  Nursing note and vitals reviewed.           Assessment/Plan:    Diagnoses and all orders for this  visit:    Controlled type 2 diabetes mellitus with microalbuminuria, without long-term current use of insulin ([facility])  -     STEGLATRO 15 MG Oral Tab tablet; Take 1 tablet (15 mg total) by mouth every morning.  Refilled the medications for diabetes.  Chronic obstructive pulmonary disease, unspecified COPD type ([facility])  -     fluticasone-salmeterol (WIXELA INHUB) 250-50 MCG/ACT Inhalation Aerosol Powder, Breath Activated; Inhale 1 puff into the lungs every 12 (twelve) hours.  -     CT CHEST (CPT=71250); Future  Patient Instructions   Mammogram follow-up order has been ordered.  Do this around October 24, 2024 and then the CAT scan of the chest has been ordered for you and you should do that around December 17, 2024.    Tobacco use disorder  -     fluticasone-salmeterol (WIXELA INHUB) 250-50 MCG/ACT Inhalation Aerosol Powder, Breath Activated; Inhale 1 puff into the lungs every 12 (twelve) hours.  -     CT CHEST (CPT=71250); Future  Continue to try to quit tobacco  Pulmonary nodule, right  -     CT CHEST (CPT=71250); Future  Discussed the CAT scan with her  Type 2 diabetes mellitus without retinopathy ([facility])  -     STEGLATRO 15 MG Oral Tab tablet; Take 1 tablet (15 mg total) by mouth every morning.    PUD (peptic ulcer disease)  -     pantoprazole 40 MG Oral Tab EC; Take 1 tablet (40 mg total) by mouth daily.  No bloody stools.  She does take pantoprazole which helps.  Mixed hyperlipidemia  -     atorvastatin 80 MG Oral Tab; Take 1 tablet (80 mg total) by mouth nightly.  Compliant on atorvastatin  Diabetic polyneuropathy associated with type 2 diabetes mellitus ([facility])  -     gabapentin 100 MG Oral Cap; Take 1 capsule (100 mg total) by mouth nightly. For diabetic neuropathy of the feet.  The neuropathy is mild  Anxiety  -     venlafaxine  MG Oral Tablet 24 Hr; Take 1 tablet (225 mg total) by mouth every morning.  -     ALPRAZolam (XANAX) 0.25 MG Oral Tab; Take 1 tablet (0.25 mg total) by mouth daily as needed for  Anxiety.  Venlafaxine helps with her anxiety and she would like Xanax for when she is very stressed.  Her boyfriend suggested she ask for some .      Referrals (if applicable)  No orders of the defined types were placed in this encounter.        Follow up if symptoms persist.  Take medicine (if given) as prescribed.  Approach to treatment discussed and patient/family member understands and agrees to plan.     Return in about 3 months (around 9/17/2024) for Diabetes Followup.        Juanito Dias,   6/17/2024

## 2024-09-23 ENCOUNTER — OFFICE VISIT (OUTPATIENT)
Dept: DERMATOLOGY CLINIC | Facility: CLINIC | Age: 64
End: 2024-09-23

## 2024-09-23 DIAGNOSIS — D23.9 BENIGN NEOPLASM OF SKIN, UNSPECIFIED LOCATION: ICD-10-CM

## 2024-09-23 DIAGNOSIS — Z85.820 ENCOUNTER FOR FOLLOW-UP SURVEILLANCE OF MELANOMA: ICD-10-CM

## 2024-09-23 DIAGNOSIS — Z08 ENCOUNTER FOR FOLLOW-UP SURVEILLANCE OF MELANOMA: ICD-10-CM

## 2024-09-23 DIAGNOSIS — Z13.89 ENCOUNTER FOR SURVEILLANCE OF ABNORMAL NEVI: ICD-10-CM

## 2024-09-23 DIAGNOSIS — D22.9 MULTIPLE NEVI: Primary | ICD-10-CM

## 2024-09-23 DIAGNOSIS — Z80.8 FAMILY HISTORY OF NONMELANOMA SKIN CANCER: ICD-10-CM

## 2024-09-23 DIAGNOSIS — L82.1 SEBORRHEIC KERATOSES: ICD-10-CM

## 2024-09-23 DIAGNOSIS — Z80.8 FAMILY HISTORY OF MELANOMA: ICD-10-CM

## 2024-09-23 DIAGNOSIS — Z86.018 HISTORY OF DYSPLASTIC NEVUS: ICD-10-CM

## 2024-09-23 PROCEDURE — 99214 OFFICE O/P EST MOD 30 MIN: CPT | Performed by: DERMATOLOGY

## 2024-09-30 NOTE — PROGRESS NOTES
Harini Cuadra is a 64 year old female.  HPI:     CC:    Chief Complaint   Patient presents with    Full Skin Exam     LOV . Pt present for full body exam. Hx of melanoma. Pt present with a spot on her R foot x 1-2 years. Spot is raised and dry.          Allergies:  Patient has no known allergies.    HISTORY:    Past Medical History:    Acute carpal tunnel syndrome of right wrist    Right Endoscopic Carpal Tunnel Release     Anxiety state, unspecified    Arthritis    Asthma (HCC)    Back problem    Borderline diabetes    Colon polyps    Decorative tattoo    Depression    Diabetes (HCC)    Dysplastic nevus    right posterior shoulder - mild atypia    Dysplastic nevus    mid back - moderate atypia    Esophageal reflux    High cholesterol    Lentiginous compound nevus    lower lateral back    Malignant melanoma of upper back (HCC)    Visual impairment      Past Surgical History:   Procedure Laterality Date    Biopsy of skin lesion Right 2018    melanoma upper back    Breast biopsy Right     skin cyst    Breast biopsy Left     skin cyst    Colonoscopy  10/02/2015    Colonoscopy  2024    Dr. Cannon; colon polyps, diverticulosis, hemorrhoids    Colonoscopy N/A 2024    Procedure: COLONOSCOPY;  Surgeon: Jose Maria Cannon MD;  Location: [facility] ENDO    Excis bartholin gland/cyst Left 2012    Hemorrhoidectomy  2012    PPH    Raquel localization wire 1 site left (cpt=19281)      cyst removal    Raquel localization wire 1 site right (cpt=19281)      cyst removed    Nebulizer, ultrasonic  10/25/2019      1993    Wrist arthroscop,release xvers lig Right 2021    Right Endoscopic Carpal Tunnel Release      Family History   Problem Relation Age of Onset    Melanoma Self     Hypertension Mother     Other (Other) Mother         a fib    Bipolar Disorder Brother     Other (Other) Brother         Diverticulitis    Blindness Brother     Other (Other) Brother         Deaf    Cancer Son         malignant  melanoma    Cancer Maternal Grandmother         ?liver    Diabetes Neg     Glaucoma Neg     Macular degeneration Neg       Social History     Socioeconomic History    Marital status:     Number of children: 1   Occupational History    Occupation: Office work, purchasing    Occupation: Office work, telephone orders   Tobacco Use    Smoking status: Every Day     Current packs/day: 0.00     Average packs/day: 1 pack/day for 40.0 years (40.0 ttl pk-yrs)     Types: Cigarettes     Start date: 10/10/1980     Last attempt to quit: 10/10/2020     Years since quitting: 3.9    Smokeless tobacco: Former    Tobacco comments:     About a pack a day per patient   Vaping Use    Vaping status: Former   Substance and Sexual Activity    Alcohol use: Not Currently     Comment: rarely    Drug use: No    Sexual activity: Not Currently   Other Topics Concern    Pt has a pacemaker No    Pt has a defibrillator No    Reaction to local anesthetic No    Caffeine Concern No    Right Handed Yes   Social History Narrative    Live with son    Work in office / office work        Current Outpatient Medications   Medication Sig Dispense Refill    fluticasone-salmeterol (WIXELA INHUB) 250-50 MCG/ACT Inhalation Aerosol Powder, Breath Activated Inhale 1 puff into the lungs every 12 (twelve) hours. 3 each 2    venlafaxine  MG Oral Tablet 24 Hr Take 1 tablet (225 mg total) by mouth every morning. 90 tablet 3    STEGLATRO 15 MG Oral Tab tablet Take 1 tablet (15 mg total) by mouth every morning. 90 tablet 3    pantoprazole 40 MG Oral Tab EC Take 1 tablet (40 mg total) by mouth daily. 90 tablet 3    atorvastatin 80 MG Oral Tab Take 1 tablet (80 mg total) by mouth nightly. 90 tablet 3    gabapentin 100 MG Oral Cap Take 1 capsule (100 mg total) by mouth nightly. For diabetic neuropathy of the feet. 90 capsule 1    ALPRAZolam (XANAX) 0.25 MG Oral Tab Take 1 tablet (0.25 mg total) by mouth daily as needed for Anxiety. 20 tablet 0    albuterol 108  (90 Base) MCG/ACT Inhalation Aero Soln Inhale 2 puffs into the lungs 4 (four) times daily as needed for Wheezing. 25.5 g 3    cetirizine 10 MG Oral Tab Take 1 tablet (10 mg total) by mouth daily. 90 tablet 3    metFORMIN  MG Oral Tablet 24 Hr TAKE 2 TABLETS IN THE MORNING AND TAKE 2 TABLETS IN THE EVENING, TAKE WITH MEALS 360 tablet 3    CRANBERRY OR Take by mouth.      ONETOUCH ULTRA In Vitro Strip USE BID AND PRN      OneTouch Delica Lancets 30G Does not apply Misc USE BID AND PRN      Potassium (POTASSIMIN OR) Take by mouth.      Multiple Vitamins-Minerals (MULTIVITAMIN ADULT OR) Take by mouth.      Coenzyme Q10 (CO Q 10 OR) Take by mouth.      Misc Natural Products (GLUCOSAMINE CHOND COMPLEX/MSM OR) Take by mouth.      Docusate Sodium (STOOL SOFTENER OR) Take by mouth.       Allergies:   No Known Allergies    Past Medical History:    Acute carpal tunnel syndrome of right wrist    Right Endoscopic Carpal Tunnel Release     Anxiety state, unspecified    Arthritis    Asthma (HCC)    Back problem    Borderline diabetes    Colon polyps    Decorative tattoo    Depression    Diabetes (HCC)    Dysplastic nevus    right posterior shoulder - mild atypia    Dysplastic nevus    mid back - moderate atypia    Esophageal reflux    High cholesterol    Lentiginous compound nevus    lower lateral back    Malignant melanoma of upper back (HCC)    Visual impairment     Past Surgical History:   Procedure Laterality Date    Biopsy of skin lesion Right 11/08/2018    melanoma upper back    Breast biopsy Right 1987    skin cyst    Breast biopsy Left 2018    skin cyst    Colonoscopy  10/02/2015    Colonoscopy  04/29/2024    Dr. Cannon; colon polyps, diverticulosis, hemorrhoids    Colonoscopy N/A 4/29/2024    Procedure: COLONOSCOPY;  Surgeon: Jose Maria Cannon MD;  Location: [facility] ENDO    Excis bartholin gland/cyst Left 11/16/2012    Hemorrhoidectomy  2012    PPH    Raquel localization wire 1 site left (cpt=19281)      cyst removal     Raquel localization wire 1 site right (cpt=19281)      cyst removed    Nebulizer, ultrasonic  10/25/2019      1993    Wrist arthroscop,release xvers lig Right 2021    Right Endoscopic Carpal Tunnel Release     Social History     Socioeconomic History    Marital status:      Spouse name: Not on file    Number of children: 1    Years of education: Not on file    Highest education level: Not on file   Occupational History    Occupation: Office work, purchasing    Occupation: Office work, telephone orders   Tobacco Use    Smoking status: Every Day     Current packs/day: 0.00     Average packs/day: 1 pack/day for 40.0 years (40.0 ttl pk-yrs)     Types: Cigarettes     Start date: 10/10/1980     Last attempt to quit: 10/10/2020     Years since quitting: 3.9    Smokeless tobacco: Former    Tobacco comments:     About a pack a day per patient   Vaping Use    Vaping status: Former   Substance and Sexual Activity    Alcohol use: Not Currently     Comment: rarely    Drug use: No    Sexual activity: Not Currently   Other Topics Concern    Grew up on a farm Not Asked    History of tanning Not Asked    Outdoor occupation Not Asked    Pt has a pacemaker No    Pt has a defibrillator No    Breast feeding Not Asked    Reaction to local anesthetic No     Service Not Asked    Blood Transfusions Not Asked    Caffeine Concern No    Occupational Exposure Not Asked    Hobby Hazards Not Asked    Sleep Concern Not Asked    Stress Concern Not Asked    Weight Concern Not Asked    Special Diet Not Asked    Back Care Not Asked    Exercise Not Asked    Bike Helmet Not Asked    Seat Belt Not Asked    Self-Exams Not Asked    Left Handed Not Asked    Right Handed Yes    Currently spends a great deal of time in the sun Not Asked    Past Sunlamp Treatments for Acne Not Asked    Hx of Spending Great Deal of Time in Sun Not Asked    Bad sunburns in the past Not Asked    Tanning Salons in the Past Not Asked    Hx of Radiation  Treatments Not Asked    Regular use of sun block Not Asked   Social History Narrative    Live with son    Work in office / office work     Social Determinants of Health     Financial Resource Strain: Not on file   Food Insecurity: Not on file   Transportation Needs: Not on file   Physical Activity: Not on file   Stress: Not on file   Social Connections: Not on file   Housing Stability: Not on file     Family History   Problem Relation Age of Onset    Melanoma Self     Hypertension Mother     Other (Other) Mother         a fib    Bipolar Disorder Brother     Other (Other) Brother         Diverticulitis    Blindness Brother     Other (Other) Brother         Deaf    Cancer Son         malignant melanoma    Cancer Maternal Grandmother         ?liver    Diabetes Neg     Glaucoma Neg     Macular degeneration Neg        There were no vitals filed for this visit.    HPI:    Chief Complaint   Patient presents with    Full Skin Exam     LOV 01/24. Pt present for full body exam. Hx of melanoma. Pt present with a spot on her R foot x 1-2 years. Spot is raised and dry.      Patient here for follow-up.  No particular concerns.  Overall no new suspicious lesions no problematic lesions.  Careful with sun exposure.  Nothing else really changing   no other new complaints  Still lots of stress at home.  Has been healthy.  No recent illnesses.  Follow-up history melanoma family history melanoma.-Son, nonmelanoma skin cancer in her mother recent excision dysplastic nevus left mid back, biopsy right posterior shoulder atypical nevus    Has been careful with sunscreen.   Nothing really changing as far as patient aware.  Scar on upper back  at times improving.    No particular changes in skin lesions noted by patient denies changing lesions no other particular lesions of concern.    Past notes/ records and appropriate/relevant lab results including pathology and past body maps reviewed. Updated and new information noted in  current visit.     Status post excision of melanoma right posterior shoulder upper back 10/2018.  0.5 mm     fhx skin ca-nmsc father , mother and melanoma in son.     Patient presents with concerns above.    Patient has been in their usual state of health.  History, medications, allergies reviewed as noted.      ROS:  Denies any other systemic complaints.10 point review of systems was completed.  Pertinent positives and negatives noted in the the HPI.  No new or changeing lesions other than noted above. No fevers, chills, night sweats, unusual sun sensitivity.  No other skin complaints.        History, medications, allergies reviewed as noted.       Physical Examination:     Well-developed well-nourished patient alert oriented in no acute distress.  Exam total-body performed, including scalp, head, neck, face,nails, hair, external eyes, including conjunctival mucosa, eyelids, lips external ears, back, chest,/ breasts, axillae,  abdomen, arms, legs, palms.     Multiple light to medium brown, well marginated, uniformly pigmented, macules and papules 6 mm and less scattered on exam. pigmented lesions examined with dermoscopy benign-appearing patterns.     Waxy tannish keratotic papules scattered, cherry-red vascular papules scattered.    See map today's date for lesions noted .      Otherwise remarkable for lesions as noted on map.  See details of examination  See Assessment /Plan for additional history and physical exam also:    Assessment / plan:    No orders of the defined types were placed in this encounter.      Meds & Refills for this Visit:  Requested Prescriptions      No prescriptions requested or ordered in this encounter         Encounter Diagnoses   Name Primary?    Multiple nevi Yes    History of dysplastic nevus     Seborrheic keratoses     Family history of melanoma     Family history of nonmelanoma skin cancer     Encounter for follow-up surveillance of melanoma     Encounter for surveillance of  abnormal nevi     Benign neoplasm of skin, unspecified location        See details on map.      Remarkable for:  Overall stable no significant changes in exam no new suspicious pigmented lesions, no other AK's continue regular monitoring at least annually    Excoriations left shoulder, legs.  's nodules.  Monitor patient aware  Recheck left shoulder at follow-up    No AK's no new suspicious pigmented lesions.  No significant changes in exam.  Continue careful monitoring follow-up      Pigmented lesions essentially unchanged.  Continue careful follow-up monitoring annually continue careful follow-up and monitoring    No new atypical features in nevi.  No new suspicious lesions continue sun protection.  Patient's not been outdoors as much recently.  Right posterior shoulder2/21 lentiginous junctional dysplastic nevus mild atypia associated with seborrheic keratosis margins clear on biopsy no recurrence      Left mid back atypical nevus post reexcision pathology lentiginous dysplastic compound nevus moderate architectural and mild to moderate cytologic atypia close margins excised3/21    Discomfort improving amitriptyline helps has not needed to take this as frequently for pain in the scar overall this is improved significantly    Hypertrophic, abnormal scarring has improved  Dermal papular nodule pinkish at left medial knee, distal thigh 4 mm.  Patient does pick at this.  Will observe currently.  Unchanged 's nodules as noted above    Multiple benign keratoses scattered few new lesions over the arms chest back    Waxy tan keratotic papules lesions in areas of concern as noted reassurance given.  Benign nature discussed.  Possibility of cryo, alphahydroxy acids over-the-counter retinol's discussed.    Extensive lentigines keratoses reassurance  Crusted verrucoid keratotic papules scattered along the posterior scalp consistent with 's nodules.  Discussed pathophysiology diagnosis.  Consider possible  cryo.  Reassurance given lesions continue to be bothersome consider biopsy.  Benign nature discussed.  Avoid manipulating.    No AK's   Sun protection, sunscreen/ blocks encouraged .  Monitoring for new lesions.  Sun damage additional recurrent and new actinic keratoses, skin cancers may occur in areas of prior actinic keratoses, related to past sun exposure to minimize current sun exposure.  Sunscreen applied consistently regularly, reapplication and sun protection while driving recommended.    Biopsy 2019  Lentiginous compound nevus from left back reassurance no evidence recurrence    Status post excision melanoma 10/2018 0.5 mm.  No adenopathy no recurrence excellent cosmetic outcome.  Scar itchy intermittently.  Reassurance given.    Macule r flank with central darker pigment-4mm unchanged observe probable juctional nevs vs sk unchanged multiple waxy tan papules scattered over the shoulders upper back lower extremities reassurance.  Extensive lentigines    Scattered waxy tan papules reassurance.  Dermatofibroma right post thigh left lower leg, right lower leg` given no other new suspicious pigmented lesions.  Fair skin, moderate sun damage continue careful sun protection.  Regular skin    Extensive nevi generalized, diffuse lenitgenes and ephelides.   No significant changes in pigmented lesions.  Continue careful sun protection regular skin checks follow-up 6 months or as needed    please refer to map for specific lesions.  See additional diagnoses.  Pros cons of various therapies, risks benefits discussed.Pathophysiology discussed with patient.  Therapeutic options reviewed.  See  Medications in grid.  Instructions reviewed at length.    Benign nevi, seborrheic  keratoses, cherry angiomas:  Reassurance regarding other benign skin lesions.Signs and symptoms of skin cancer, ABCDE's of melanoma discussed with patient. Sunscreen use, sun protection, self exams reviewed.  Followup as noted RTC routine checkup 4  months or p.r.n.    This note was generated using Dragon voice recognition software.  Please contact me regarding any confusion resulting from errors in recognition.     Encounter Times   Including precharting, reviewing chart, prior notes obtaining history: 10 minutes, medical exam :10 minutes, notes on body map, plan, counseling 10minutes My total time spent caring for the patient on the day of the encounter: 30 minutes    Note to patient and family: The 21st Century Cures Act makes medical notes like these available to patients. However, be advised this is a medical document. It is intended as [id number] communication and monitoring of a patient's care needs. It is written in medical language and may contain abbreviations or verbiage that are unfamiliar. It may appear blunt or direct. Medical documents are intended to carry relevant information, facts as evident and the clinical opinion of the practitioner.

## 2024-12-02 ENCOUNTER — NURSE TRIAGE (OUTPATIENT)
Dept: FAMILY MEDICINE CLINIC | Facility: CLINIC | Age: 64
End: 2024-12-02

## 2024-12-02 RX ORDER — CYCLOBENZAPRINE HCL 10 MG
10 TABLET ORAL 3 TIMES DAILY PRN
Qty: 30 TABLET | Refills: 0 | Status: SHIPPED | OUTPATIENT
Start: 2024-12-02

## 2024-12-02 NOTE — TELEPHONE ENCOUNTER
I called and left message for patient to call us back about below  Also sending mychart message

## 2024-12-02 NOTE — TELEPHONE ENCOUNTER
Dr Dias=see below and advise , thanks.     Action Requested: Summary for Provider     []  Critical Lab, Recommendations Needed  [x] Need Additional Advice  []   FYI    [x]   Need Orders  [x] Need Medications Sent to Pharmacy  []  Other     SUMMARY: Patient only requesting to send the message to Dr. Dias for a muscle relaxant prescription, preferred pharmacy [facility] in [place].  Patient DOES NOT drive.   Advised urgent care or immediate care  for worsening symptoms .      Per patient, she had a back injury and went to the emergency room a year ago. On Saturday, she hurt her back again by turning, experiencing lower back pain that radiates down to her legs. She is using a walker to assist with her walking. Currently, her pain level is 7/10 at rest, and it worsens to 10/10. She has been taking Tylenol and Advil, but they are not helping.    Reason for call: Back Pain  Onset: 2 days         Reason for Disposition   SEVERE back pain (e.g., excruciating, unable to do any normal activities) and not improved after pain medicine and CARE ADVICE    Protocols used: Back Pain-A-[place]

## 2024-12-02 NOTE — TELEPHONE ENCOUNTER
Patient calling,verified name and date of birth.  Patient calling back regarding request for medication-see 12/2/24 triage note below.    Patient advised that Dr Dias prescribed the muscle relaxer cyclobenzaprine.  She asks if Dr Dias will  also prescribe a pain reliever such as Norco for her back pain?    Dr Dias, please advise.

## 2024-12-02 NOTE — TELEPHONE ENCOUNTER
No as that is a controlled substance and patient will have to be seen or go to the immediate care.

## 2024-12-02 NOTE — TELEPHONE ENCOUNTER
Patient calling ( name and date of birth of patient verified ) checking status of message below      Explained to patient that Dr. Dias   is in clinic seeing patients  and we oralia call her back once a response has been received     Patient verbalizes understanding

## 2024-12-03 ENCOUNTER — HOSPITAL ENCOUNTER (OUTPATIENT)
Age: 64
Discharge: HOME | End: 2024-12-03
Payer: COMMERCIAL

## 2024-12-03 VITALS
TEMPERATURE: 97 F | DIASTOLIC BLOOD PRESSURE: 63 MMHG | HEART RATE: 84 BPM | SYSTOLIC BLOOD PRESSURE: 115 MMHG | RESPIRATION RATE: 18 BRPM | OXYGEN SATURATION: 100 %

## 2024-12-03 DIAGNOSIS — M54.50 LOW BACK PAIN AT MULTIPLE SITES: Primary | ICD-10-CM

## 2024-12-03 PROCEDURE — 99213 OFFICE O/P EST LOW 20 MIN: CPT | Performed by: PHYSICIAN ASSISTANT

## 2024-12-03 PROCEDURE — 96372 THER/PROPH/DIAG INJ SC/IM: CPT | Performed by: PHYSICIAN ASSISTANT

## 2024-12-03 RX ORDER — KETOROLAC TROMETHAMINE 30 MG/ML
30 INJECTION, SOLUTION INTRAMUSCULAR; INTRAVENOUS ONCE
Status: COMPLETED | OUTPATIENT
Start: 2024-12-03 | End: 2024-12-03

## 2024-12-03 NOTE — ED PROVIDER NOTES
Patient Seen in: [facility]      History     Chief Complaint   Patient presents with    Back Pain     Stated Complaint: Back Pain    Subjective:   HPI      Patient is a 64-year-old female with past medical history of chronic back pain that presents to immediate care due to back pain x 2 days.  Patient states that she injured her back over the weekend by bending over.  Patient states that pain feels similar to prior back pain.  Has been seen by Ortho 1 year ago and had MRI, x-rays showing herniated disks.  Patient was instructed to follow-up with pain management however states that she has been unable to.  patient denies saddle anesthesia bowel or bladder incontinence, dysuria, hematuria, fever.  Has been taking Tylenol ibuprofen last dose yesterday.  Patient additionally took Flexeril this morning prescribed by her PCP.  Patient requesting \"strong pain pills\".    Objective:     Past Medical History:    Acute carpal tunnel syndrome of right wrist    Right Endoscopic Carpal Tunnel Release     Anxiety state, unspecified    Arthritis    Asthma (HCC)    Back problem    Borderline diabetes    Colon polyps    Decorative tattoo    Depression    Diabetes (HCC)    Dysplastic nevus    right posterior shoulder - mild atypia    Dysplastic nevus    mid back - moderate atypia    Esophageal reflux    High cholesterol    Lentiginous compound nevus    lower lateral back    Malignant melanoma of upper back (HCC)    Visual impairment              Past Surgical History:   Procedure Laterality Date    Biopsy of skin lesion Right 11/08/2018    melanoma upper back    Breast biopsy Right 1987    skin cyst    Breast biopsy Left 2018    skin cyst    Colonoscopy  10/02/2015    Colonoscopy  04/29/2024    Dr. Cannon; colon polyps, diverticulosis, hemorrhoids    Colonoscopy N/A 4/29/2024    Procedure: COLONOSCOPY;  Surgeon: Jose Maria Cannon MD;  Location: [facility] ENDO    Excis bartholin gland/cyst Left 11/16/2012    Hemorrhoidectomy       PPH    Raquel localization wire 1 site left (cpt=19281)      cyst removal    Raquel localization wire 1 site right (cpt=19281)      cyst removed    Nebulizer, ultrasonic  10/25/2019      1993    Wrist arthroscop,release xvers lig Right 2021    Right Endoscopic Carpal Tunnel Release                Social History     Socioeconomic History    Marital status:     Number of children: 1   Occupational History    Occupation: Office work, purchasing    Occupation: Office work, telephone orders   Tobacco Use    Smoking status: Every Day     Current packs/day: 0.00     Average packs/day: 1 pack/day for 40.0 years (40.0 ttl pk-yrs)     Types: Cigarettes     Start date: 10/10/1980     Last attempt to quit: 10/10/2020     Years since quittin.1    Smokeless tobacco: Former    Tobacco comments:     About a pack a day per patient   Vaping Use    Vaping status: Former   Substance and Sexual Activity    Alcohol use: Not Currently     Comment: rarely    Drug use: No    Sexual activity: Not Currently   Other Topics Concern    Pt has a pacemaker No    Pt has a defibrillator No    Reaction to local anesthetic No    Caffeine Concern No    Right Handed Yes   Social History Narrative    Live with son    Work in office / office work              Review of Systems    Positive for stated complaint: Back Pain  Other systems are as noted in HPI.  Constitutional and vital signs reviewed.      All other systems reviewed and negative except as noted above.    Physical Exam     ED Triage Vitals [24 1119]   /63   Pulse 84   Resp 18   Temp 97.2 °F (36.2 °C)   Temp src Temporal   SpO2 100 %   O2 Device None (Room air)       Current Vitals:   Vital Signs  BP: 115/63  Pulse: 84  Resp: 18  Temp: 97.2 °F (36.2 °C)  Temp src: Temporal    Oxygen Therapy  SpO2: 100 %  O2 Device: None (Room air)        Physical Exam  Vital signs reviewed. Nursing note reviewed.  Constitutional: Well-developed. Well-nourished. Lying in bed,  in no acute distress  HENT: Mucous membranes moist.   EYES: No scleral icterus or conjunctival injection.  NECK: Full ROM. Supple.   CARDIAC: Normal rate. Normal S1/ S2. No murmurs. 2+ distal pulses. No edema  PULM/CHEST: Clear to auscultation bilaterally. No wheezes  ABD: Soft, non-tender, non-distended. +BS. No guarding, no rebound.  BACK: No T or L spine tenderness. Left lumbar paraspinal tenderness to palpation  RECTAL: deferred  Extremities: Full ROM  NEURO: Awake, alert, 5/5 strength in hip flexors, knee flexion/extension, plantar/dorsiflexion bilaterally. Equal sensation in both legs. No saddle anesthesia  SKIN: Warm and dry. No rash or lesions.  PSYCH: Normal judgment. Normal affect.               MDM      Patient is a 64-year-old female with past medical history of chronic back pain that presents to immediate care due to low back pain x 2 days.  Patient arrives with stable vitals sitting comfortably in no acute distress.  Physical exam showing point tenderness to palpation of left lumbar paraspinal muscles.  Most likely musculoskeletal pain, acute on chronic lower back pain, lower back strain.  Considered lumbar x-ray images however unlikely compression fracture, spine fracture without acute injury or fall.  Less likely cord compression or cauda equina with no concerning presenting symptoms and reassuring physical exam.  Unlikely epidural abscess with no infectious symptoms.  Review of outside records showing reassuring x-ray and MRIs performed by orthopedic 1 year ago due to similar back pain.  Patient states that she has been unable to follow-up with pain management.  Will treat with Toradol prior to discharge.  Considered prescription for Flexeril however patient just refilled medication prescribed by PCP and took dose this morning.  Return precautions discussed including worsening pain, lower extremity weakness, paresthesias, bowel or bladder incontinence or fever.  Patient expressed understanding all  questions answered.  History given by patient.          Medical Decision Making      Disposition and Plan     Clinical Impression:  1. Low back pain at multiple sites         Disposition:  Discharge  12/3/2024 11:33 am    Follow-up:  Juanito Dias DO  [address]  [place] [place] [address]  [phone]    Schedule an appointment as soon as possible for a visit       Abram Moore MD  [address]  [facility] [address]  [phone]    Schedule an appointment as soon as possible for a visit             Medications Prescribed:  Current Discharge Medication List              Supplementary Documentation:

## 2024-12-03 NOTE — ED INITIAL ASSESSMENT (HPI)
Lower back pain x 4 days. Patient reports reaching to grab stylus from table, which caused muscle spasms. Patient reports pain worsened Sunday and continued until today. Reports radiation of pain down bilateral legs. Denies numbness or tingling. Took muscle relaxer today at 0800. Ambulated with walker.

## 2024-12-04 ENCOUNTER — APPOINTMENT (OUTPATIENT)
Dept: MRI IMAGING | Facility: HOSPITAL | Age: 64
End: 2024-12-04
Attending: STUDENT IN AN ORGANIZED HEALTH CARE EDUCATION/TRAINING PROGRAM
Payer: COMMERCIAL

## 2024-12-04 ENCOUNTER — HOSPITAL ENCOUNTER (EMERGENCY)
Facility: HOSPITAL | Age: 64
Discharge: HOME | End: 2024-12-04
Attending: STUDENT IN AN ORGANIZED HEALTH CARE EDUCATION/TRAINING PROGRAM
Payer: COMMERCIAL

## 2024-12-04 VITALS
TEMPERATURE: 97 F | HEART RATE: 80 BPM | OXYGEN SATURATION: 95 % | WEIGHT: 215 LBS | RESPIRATION RATE: 20 BRPM | HEIGHT: 68 IN | DIASTOLIC BLOOD PRESSURE: 65 MMHG | SYSTOLIC BLOOD PRESSURE: 113 MMHG | BODY MASS INDEX: 32.58 KG/M2

## 2024-12-04 DIAGNOSIS — M54.50 ACUTE LEFT-SIDED LOW BACK PAIN WITHOUT SCIATICA: Primary | ICD-10-CM

## 2024-12-04 PROCEDURE — 99284 EMERGENCY DEPT VISIT MOD MDM: CPT

## 2024-12-04 PROCEDURE — 96375 TX/PRO/DX INJ NEW DRUG ADDON: CPT

## 2024-12-04 PROCEDURE — 96374 THER/PROPH/DIAG INJ IV PUSH: CPT

## 2024-12-04 PROCEDURE — 72148 MRI LUMBAR SPINE W/O DYE: CPT | Performed by: STUDENT IN AN ORGANIZED HEALTH CARE EDUCATION/TRAINING PROGRAM

## 2024-12-04 RX ORDER — DEXAMETHASONE SODIUM PHOSPHATE 10 MG/ML
10 INJECTION, SOLUTION INTRAMUSCULAR; INTRAVENOUS ONCE
Status: COMPLETED | OUTPATIENT
Start: 2024-12-04 | End: 2024-12-04

## 2024-12-04 RX ORDER — ORPHENADRINE CITRATE 100 MG/1
100 TABLET ORAL 2 TIMES DAILY
Qty: 20 TABLET | Refills: 0 | Status: SHIPPED | OUTPATIENT
Start: 2024-12-04 | End: 2024-12-11

## 2024-12-04 RX ORDER — MORPHINE SULFATE 4 MG/ML
4 INJECTION, SOLUTION INTRAMUSCULAR; INTRAVENOUS ONCE
Status: COMPLETED | OUTPATIENT
Start: 2024-12-04 | End: 2024-12-04

## 2024-12-04 RX ORDER — KETOROLAC TROMETHAMINE 15 MG/ML
15 INJECTION, SOLUTION INTRAMUSCULAR; INTRAVENOUS ONCE
Status: COMPLETED | OUTPATIENT
Start: 2024-12-04 | End: 2024-12-04

## 2024-12-04 RX ORDER — KETOROLAC TROMETHAMINE 10 MG/1
10 TABLET, FILM COATED ORAL EVERY 6 HOURS PRN
Qty: 30 TABLET | Refills: 0 | Status: SHIPPED | OUTPATIENT
Start: 2024-12-04 | End: 2024-12-11

## 2024-12-04 RX ORDER — ORPHENADRINE CITRATE 30 MG/ML
30 INJECTION INTRAMUSCULAR; INTRAVENOUS ONCE
Status: COMPLETED | OUTPATIENT
Start: 2024-12-04 | End: 2024-12-04

## 2024-12-04 RX ORDER — METHYLPREDNISOLONE 4 MG/1
TABLET ORAL
Qty: 1 EACH | Refills: 0 | Status: SHIPPED | OUTPATIENT
Start: 2024-12-04

## 2024-12-04 NOTE — ED INITIAL ASSESSMENT (HPI)
C/o low mid to L back pain after \"tweaking\" back on sat and sund  Denies falls    Numbness to toes. Reports incontinence of stool upon getting out of car    Reports pain 1 year ago, denies back sx

## 2024-12-04 NOTE — ED PROVIDER NOTES
Patient Seen in: [facility] Emergency Department      History     Chief Complaint   Patient presents with    Back Pain     Stated Complaint: Back Pain    Subjective:   HPI    64-year-old female history of anxiety arthritis low back pain diabetes mellitus, GERD presenting with back pain.  Describes a 1 week history of worsening left low back pain without radiation into left lower extremity.  Worsening with any walking.  States tingling sensation to bilateral great toe.  Had 1 episode of reported stool incontinence while coming to the ER.  Denies bladder incontinence.  Denies history of spinal surgery instrumentation.  Was prescribed Flexeril but states not helping also taking Tylenol and ibuprofen every 4-6 hours.      Objective:     Past Medical History:    Acute carpal tunnel syndrome of right wrist    Right Endoscopic Carpal Tunnel Release     Anxiety state, unspecified    Arthritis    Asthma (HCC)    Back problem    Borderline diabetes    Colon polyps    Decorative tattoo    Depression    Diabetes (HCC)    Dysplastic nevus    right posterior shoulder - mild atypia    Dysplastic nevus    mid back - moderate atypia    Esophageal reflux    High cholesterol    Lentiginous compound nevus    lower lateral back    Malignant melanoma of upper back (HCC)    Visual impairment              Past Surgical History:   Procedure Laterality Date    Biopsy of skin lesion Right 11/08/2018    melanoma upper back    Breast biopsy Right 1987    skin cyst    Breast biopsy Left 2018    skin cyst    Colonoscopy  10/02/2015    Colonoscopy  04/29/2024    Dr. Cannon; colon polyps, diverticulosis, hemorrhoids    Colonoscopy N/A 4/29/2024    Procedure: COLONOSCOPY;  Surgeon: Jose Maria Cannon MD;  Location: [facility] ENDO    Excis bartholin gland/cyst Left 11/16/2012    Hemorrhoidectomy  2012    PPH    Raquel localization wire 1 site left (cpt=19281)      cyst removal    Raquel localization wire 1 site right (cpt=19281)      cyst removed     Nebulizer, ultrasonic  10/25/2019      1993    Wrist arthroscop,release xvers lig Right 2021    Right Endoscopic Carpal Tunnel Release                Social History     Socioeconomic History    Marital status:     Number of children: 1   Occupational History    Occupation: Office work, purchasing    Occupation: Office work, telephone orders   Tobacco Use    Smoking status: Every Day     Current packs/day: 0.00     Average packs/day: 1 pack/day for 40.0 years (40.0 ttl pk-yrs)     Types: Cigarettes     Start date: 10/10/1980     Last attempt to quit: 10/10/2020     Years since quittin.1    Smokeless tobacco: Former    Tobacco comments:     About a pack a day per patient   Vaping Use    Vaping status: Former   Substance and Sexual Activity    Alcohol use: Not Currently     Comment: rarely    Drug use: No    Sexual activity: Not Currently   Other Topics Concern    Pt has a pacemaker No    Pt has a defibrillator No    Reaction to local anesthetic No    Caffeine Concern No    Right Handed Yes   Social History Narrative    Live with son    Work in office / office work                  Physical Exam     ED Triage Vitals [24 1236]   /72   Pulse 91   Resp 20   Temp 97.2 °F (36.2 °C)   Temp src    SpO2 99 %   O2 Device None (Room air)       Current Vitals:   Vital Signs  BP: 113/65  Pulse: 80  Resp: 20  Temp: 97.2 °F (36.2 °C)  MAP (mmHg): 81    Oxygen Therapy  SpO2: 95 %  O2 Device: None (Room air)        Physical Exam  Constitutional: awake, alert, no sig distress  HENT: mmm, no lesions,  Neck: normal range of motion, no tenderness, supple.  Eyes: PERRL, EOMI, conjunctiva normal, no discharge. Sclera anicteric.  Cardiovascular: rr no murmur  Respiratory: Normal breath sounds, no respiratory distress, no wheezing, no chest tenderness.  GI: Bowel sounds normal, Soft, no tenderness, no masses, no pulsatile masses.  : No CVA tenderness.  Skin: Warm, dry, no erythema, no  rash.  Musculoskeletal: Intact distal pulses, no edema, no tenderness, no cyanosis, no clubbing. Good range of motion in all major joints. No tenderness to palpation or major deformities noted. Back-lumbar and left lumbar paraspinal tenderness palpation negative straight leg raise bilaterally  Neurologic: Alert & oriented x 3, normal motor function, normal sensory function, no focal deficits noted.  Psych: Calm, cooperative, nl affect    ED Course   Labs Reviewed - No data to display                MDM      64-year-old female presenting with back pain.  On arrival vitals are stable and reassuring.  Difficult to exclude cord compression given bowel incontinence obtain MRI.  DDx includes lumbar strain, cauda equina, other    -On reevaluation patient improved.  Able to walk.  Discussed MRI findings.  Return precautions and follow-up instructions were discussed with patient who voiced understanding and agreement the plan.  All questions were answered to patient satisfaction.      Medical Decision Making      Disposition and Plan     Clinical Impression:  1. Acute left-sided low back pain without sciatica         Disposition:  Discharge  12/4/2024  3:27 pm    Follow-up:  Juanito Dias DO  [address]  [address]  [place] [facility] [address]  [phone]    Follow up      [facility]  [address]  [facility] [id number] [phone]  Follow up  As needed, If symptoms worsen    Jaylene Dunaway DO  [address]  [address]  [facility] [id number] [phone]    Call            Medications Prescribed:  Discharge Medication List as of 12/4/2024  3:46 PM        START taking these medications    Details   orphenadrine  MG Oral Tablet 12 Hr Take 100 mg by mouth 2 (two) times daily for 7 days., Normal, Disp-20 tablet, R-0      methylPREDNISolone (MEDROL) 4 MG Oral Tablet Therapy Pack Dosepack: take as directed, Normal, Disp-1 each, R-0      Ketorolac Tromethamine 10 MG Oral Tab Take 1 tablet  (10 mg total) by mouth every 6 (six) hours as needed for Pain., Normal, Disp-30 tablet, R-0                 Supplementary Documentation:

## 2025-02-03 ENCOUNTER — TELEPHONE (OUTPATIENT)
Dept: FAMILY MEDICINE CLINIC | Facility: CLINIC | Age: 65
End: 2025-02-03

## 2025-02-03 DIAGNOSIS — E11.9 TYPE 2 DIABETES MELLITUS WITHOUT RETINOPATHY (HCC): Primary | ICD-10-CM

## 2025-02-03 RX ORDER — DAPAGLIFLOZIN 10 MG/1
10 TABLET, FILM COATED ORAL DAILY
Qty: 90 TABLET | Refills: 0 | Status: SHIPPED | OUTPATIENT
Start: 2025-02-03 | End: 2025-05-04

## 2025-02-03 RX ORDER — DAPAGLIFLOZIN 10 MG/1
10 TABLET, FILM COATED ORAL DAILY
Qty: 90 TABLET | Refills: 0 | Status: SHIPPED | OUTPATIENT
Start: 2025-02-03 | End: 2025-02-03

## 2025-02-03 NOTE — TELEPHONE ENCOUNTER
Pharmacy is requesting alternative medication- preferred are METFORMIN HCL tabs, SAXAGLIPTIN HCL tabs, FARXIGA tabs, JARDIANCE tabs     STEGLATRO 15 MG Oral Tab tablet Take 1 tablet (15 mg total) by mouth every morning. 90 tablet 3

## 2025-02-03 NOTE — TELEPHONE ENCOUNTER
Not on formulary for 2025. Start PA? Or change medication?  Patient currently has supply to last into March 2025.

## 2025-02-03 NOTE — TELEPHONE ENCOUNTER
Spoke with patient and relayed message from provider below. Patient verbalized understanding of directive for a follow up appointment. Patient states her prescriptions go through "[facility] not [facility] and requests it be sent to [facility].     Dapagliflozin prescription sent to [facility]     Call to [facility]-spoke to Teresita and notified of patient's request to send her prescription to [facility]. She states she will delete order.

## 2025-02-03 NOTE — TELEPHONE ENCOUNTER
Inform her I sent in Farxiga instead as Stacy is not on her formulary.  Once she is on this for about the next 6 to 8 weeks have her see me.  Make her an appointment for sometime in April.

## 2025-02-07 DIAGNOSIS — E11.42 DIABETIC POLYNEUROPATHY ASSOCIATED WITH TYPE 2 DIABETES MELLITUS (HCC): ICD-10-CM

## 2025-02-12 NOTE — TELEPHONE ENCOUNTER
Please review. Protocol Failed; No Protocol    Requested Prescriptions   Pending Prescriptions Disp Refills    GABAPENTIN 100 MG Oral Cap [Pharmacy Med Name: GABAPENTIN CAPS 100MG] 90 capsule 3     Sig: TAKE 1 CAPSULE NIGHTLY FOR DIABETIC NEUROPATHY OF THE FEET       Neurology Medications Failed - 2/12/2025  9:23 AM        Failed - In person appointment or virtual visit in the past 6 mos or appointment in next 3 mos     Recent Outpatient Visits              4 months ago [facility]    [facility] Gladys Young MD    Office Visit    8 months ago Controlled type 2 diabetes mellitus with microalbuminuria, without long-term current use of insulin ([facility]     [facility]    10 months ago Controlled type 2 diabetes mellitus with microalbuminuria, without long-term current use of insulin ([facility])    [facility]     Office Visit    11 months ago Encounter for gynecological examination without abnormal finding    [facility] - OB/GYN Shaunna Yeung MD    Office Visit    1 year ago [facility]    [facility]          Future Appointments         Provider Department Appt Notes    In 7 months [facility] Hurd MD [facility]                    Failed - Medication is active on med list               Future Appointments         Provider Department Appt Notes    In 7 months Gladys Hurd MD [facility] Ellen full body ck          Recent Outpatient Visits              4 months ago [facility]    [facility]    Office Visit    8 months ago Controlled type 2 diabetes mellitus  with microalbuminuria, without long-term current use of insulin ([facility])    [facility] [address], Juanito Mak,     Office Visit    10 months ago Controlled type 2 diabetes mellitus with microalbuminuria, without long-term current use of insulin ([facility])    [facility]     Office Visit    11 months ago Encounter for gynecological examination without abnormal finding    [facility] - OB/GYN Shaunna Yeung MD    Office Visit    1 year ago Multiple nevi    [facility] Gladys Hurd MD    Office Visit

## 2025-02-13 RX ORDER — GABAPENTIN 100 MG/1
CAPSULE ORAL
Qty: 90 CAPSULE | Refills: 3 | Status: SHIPPED | OUTPATIENT
Start: 2025-02-13

## 2025-02-14 DIAGNOSIS — E11.9 TYPE 2 DIABETES MELLITUS WITHOUT RETINOPATHY (HCC): ICD-10-CM

## 2025-02-14 DIAGNOSIS — E11.29 CONTROLLED TYPE 2 DIABETES MELLITUS WITH MICROALBUMINURIA, WITHOUT LONG-TERM CURRENT USE OF INSULIN (HCC): ICD-10-CM

## 2025-02-14 DIAGNOSIS — R80.9 CONTROLLED TYPE 2 DIABETES MELLITUS WITH MICROALBUMINURIA, WITHOUT LONG-TERM CURRENT USE OF INSULIN (HCC): ICD-10-CM

## 2025-02-19 RX ORDER — METFORMIN HYDROCHLORIDE 500 MG/1
TABLET, EXTENDED RELEASE ORAL
Qty: 360 TABLET | Refills: 3 | Status: SHIPPED | OUTPATIENT
Start: 2025-02-19

## 2025-02-24 ENCOUNTER — TELEPHONE (OUTPATIENT)
Dept: FAMILY MEDICINE CLINIC | Facility: CLINIC | Age: 65
End: 2025-02-24

## 2025-02-24 NOTE — TELEPHONE ENCOUNTER
Jessica with Dr. Muniz office is calling to request last office notes to be faxed to below.     Fax # [phone]

## 2025-04-02 DIAGNOSIS — J30.89 OTHER ALLERGIC RHINITIS: ICD-10-CM

## 2025-04-04 RX ORDER — CETIRIZINE HYDROCHLORIDE 10 MG/1
10 TABLET ORAL DAILY
Qty: 90 TABLET | Refills: 3 | Status: SHIPPED | OUTPATIENT
Start: 2025-04-04

## 2025-04-04 NOTE — TELEPHONE ENCOUNTER
Refill Per Protocol     Requested Prescriptions   Pending Prescriptions Disp Refills    CETIRIZINE 10 MG Oral Tab [Pharmacy Med Name: CETIRIZINE TABS-OTC 10MG] 90 tablet 3     Sig: TAKE 1 TABLET DAILY       Allergy Medication Protocol Passed - 4/4/2025  3:33 PM        Passed - In person appointment or virtual visit in the past 12 mos or appointment in next 3 mos     Recent Outpatient Visits              [facility]    Office Visit    9 months ago Controlled type 2 diabetes mellitus with microalbuminuria, without long-term current use of insulin ([facility]     Office Visit    1 year ago Controlled type 2 diabetes mellitus with microalbuminuria, without long-term current use of insulin ([facility])    [facility]     Office Visit    1 year ago Encounter for gynecological examination without abnormal finding    [facility]    Office Visit    1 year ago [facility]    [facility]    [facility] Visit          Future Appointments         Provider Department Appt Notes    In 5 months Gladys Hurd MD [facility] full body ck                    Passed - Medication is active on med list

## 2025-04-15 RX ORDER — ALBUTEROL SULFATE 90 UG/1
2 INHALANT RESPIRATORY (INHALATION) 4 TIMES DAILY PRN
Qty: 25.5 G | Refills: 0 | Status: SHIPPED | OUTPATIENT
Start: 2025-04-15

## 2025-06-12 DIAGNOSIS — K27.9 PUD (PEPTIC ULCER DISEASE): ICD-10-CM

## 2025-06-13 RX ORDER — PANTOPRAZOLE SODIUM 40 MG/1
40 TABLET, DELAYED RELEASE ORAL DAILY
Qty: 90 TABLET | Refills: 3 | Status: SHIPPED | OUTPATIENT
Start: 2025-06-13

## 2025-06-13 NOTE — TELEPHONE ENCOUNTER
Refill passed per [facility] protocols.    Requested Prescriptions   Pending Prescriptions Disp Refills    PANTOPRAZOLE 40 MG Oral Tab EC [Pharmacy Med Name: PANTOPRAZOLE SODIUM DR TABS 40MG] 90 tablet 3     Sig: TAKE 1 TABLET DAILY       Gastrointestional Medication Protocol Passed - 6/13/2025  1:25 PM

## 2025-07-14 ENCOUNTER — PATIENT MESSAGE (OUTPATIENT)
Dept: FAMILY MEDICINE CLINIC | Facility: CLINIC | Age: 65
End: 2025-07-14

## 2025-07-14 ENCOUNTER — TELEPHONE (OUTPATIENT)
Dept: FAMILY MEDICINE CLINIC | Facility: CLINIC | Age: 65
End: 2025-07-14

## 2025-07-14 DIAGNOSIS — R80.9 CONTROLLED TYPE 2 DIABETES MELLITUS WITH MICROALBUMINURIA, WITHOUT LONG-TERM CURRENT USE OF INSULIN (HCC): ICD-10-CM

## 2025-07-14 DIAGNOSIS — E11.29 CONTROLLED TYPE 2 DIABETES MELLITUS WITH MICROALBUMINURIA, WITHOUT LONG-TERM CURRENT USE OF INSULIN (HCC): ICD-10-CM

## 2025-07-14 DIAGNOSIS — Z00.00 ADULT GENERAL MEDICAL EXAM: Primary | ICD-10-CM

## 2025-07-14 RX ORDER — ALBUTEROL SULFATE 90 UG/1
2 INHALANT RESPIRATORY (INHALATION) 4 TIMES DAILY PRN
Qty: 25.5 G | Refills: 0 | Status: SHIPPED | OUTPATIENT
Start: 2025-07-14

## 2025-07-14 NOTE — TELEPHONE ENCOUNTER
Please review. Protocol Failed; No Protocol    Future Appointments   Date Time Provider Department Center   9/24/2025  4:45 PM Gladys Hurd MD [facility]      Routed to Patient  for assistance with appointment.

## 2025-07-14 NOTE — TELEPHONE ENCOUNTER
Per patient she has an appointment for physical on 09/18/2025 and would like a complete blood work prior to her appointment.

## 2025-07-21 NOTE — TELEPHONE ENCOUNTER
Future Appointments   Date Time Provider Department Center   9/18/2025  4:15 PM [facility]   9/24/2025  4:45 PM [facility]

## 2025-07-22 DIAGNOSIS — F41.9 ANXIETY: ICD-10-CM

## 2025-07-24 RX ORDER — VENLAFAXINE HYDROCHLORIDE 225 MG/1
225 TABLET, EXTENDED RELEASE ORAL EVERY MORNING
Qty: 30 TABLET | Refills: 0 | Status: SHIPPED | OUTPATIENT
Start: 2025-07-24

## 2025-07-24 NOTE — TELEPHONE ENCOUNTER
For replies, please route to pool: [facility] CENTRAL REFILLS    Please review: medication fails/has no protocol attached.    Future Appointments   Date Time Provider Department Center   9/18/2025  4:15 PM Juanito Dias DO [facility]

## 2025-07-28 NOTE — TELEPHONE ENCOUNTER
Approved    Prior authorization approved  Payer: [employer] Case ID: [id number]    [phone]    [phone]  Note from payer: CaseId:[id number];Status:Approved;Review Type:Prior Auth;Coverage Start Date:06/25/2025;Coverage End Date:07/25/2026;  Approval Details    Authorized from June 25, 2025 to July 25, 2026  Electronic appeal: Not supported  View History

## 2025-08-06 DIAGNOSIS — F41.9 ANXIETY: ICD-10-CM

## 2025-08-06 RX ORDER — ALPRAZOLAM 0.25 MG
0.25 TABLET ORAL DAILY PRN
Qty: 20 TABLET | Refills: 0 | Status: SHIPPED | OUTPATIENT
Start: 2025-08-06

## 2025-08-08 RX ORDER — DAPAGLIFLOZIN 10 MG/1
10 TABLET, FILM COATED ORAL DAILY
Qty: 90 TABLET | Refills: 3 | Status: SHIPPED | OUTPATIENT
Start: 2025-08-08

## (undated) DIAGNOSIS — J30.89 OTHER ALLERGIC RHINITIS: ICD-10-CM

## (undated) DEVICE — 60 ML SYRINGE REGULAR TIP: Brand: MONOJECT

## (undated) DEVICE — APPLICATOR COTTON TIP 6\" 2/PK

## (undated) DEVICE — MEDI-VAC NON-CONDUCTIVE SUCTION TUBING 6MM X 1.8M (6FT.) L: Brand: CARDINAL HEALTH

## (undated) DEVICE — BANDAGE ROLL,100% COTTON, 6 PLY, SMALL: Brand: KERLIX

## (undated) DEVICE — GAMMEX® PI HYBRID SIZE 7, STERILE POWDER-FREE SURGICAL GLOVE, POLYISOPRENE AND NEOPRENE BLEND: Brand: GAMMEX

## (undated) DEVICE — PLASTIC HAND: Brand: MEDLINE INDUSTRIES, INC.

## (undated) DEVICE — TRAP POLYP W/ 2 SPEC TY CLR MAGNIFYING WIND

## (undated) DEVICE — ECTRA II PROCEDURE KIT,                                    SINGLE-USE, DISPOSABLE. CONTENTS                                    PROBE KNIFE, RETROGRADE KNIFE,                                    TRIANGLE KNIFE, HAND PAD, SWABS: Brand: ECTRA

## (undated) DEVICE — LASSO POLYPECTOMY SNARE: Brand: LASSO

## (undated) DEVICE — Device: Brand: DUAL NARE NASAL CANNULAE FEMALE LUER CON 7FT O2 TUBE

## (undated) DEVICE — SOL  .9 1000ML BTL

## (undated) DEVICE — GIJAW SINGLE-USE BIOPSY FORCEPS WITH NEEDLE: Brand: GIJAW

## (undated) DEVICE — KIT ENDO ORCAPOD 160/180/190

## (undated) DEVICE — KIT CLEAN ENDOKIT 1.1OZ GOWNX2

## (undated) DEVICE — SUTURE ETHILON 4-0 699G

## (undated) NOTE — LETTER
5/22/2018              Soraya Orourke        [id number]        [place] [id number]         To whom it may concern,    Soraya Orourke is currently a patient under my medical care.   Patient was seen today for illness and will need to be off work today

## (undated) NOTE — LETTER
[facility]  Administration of Anesthesia  IHarini agree to be cared for by a physician anesthesiologist alone and/or with a nurse anesthetist, who is specially trained to monitor me and give me medicine to put me to sleep or keep me comfortable during my procedure    I understand that my anesthesiologist and/or anesthetist is not an employee or agent of [facility] or [employer]. He or she works for [facility] [employer]    As the patient asking for anesthesia services, I agree to:  Allow the anesthesiologist (anesthesia doctor) to give me medicine and do additional procedures as necessary. Some examples are: Starting or using an “IV” to give me medicine, fluids or blood during my procedure, and having a breathing tube placed to help me breathe when I’m asleep (intubation). In the event that my heart stops working properly, I understand that my anesthesiologist will make every effort to sustain my life, unless otherwise directed by [facility] Do Not Resuscitate documents.  Tell my anesthesia doctor before my procedure:  If I am pregnant.  The last time that I ate or drank.  iii. All of the medicines I take (including prescriptions, herbal supplements, and pills I can buy without a prescription (including street drugs/illegal medications). Failure to inform my anesthesiologist about these medicines may increase my risk of anesthetic complications.  iv.If I am allergic to anything or have had a reaction to anesthesia before.  I understand how the anesthesia medicine will help me (benefits).  I understand that with any type of anesthesia medicine there are risks:  The most common risks are: nausea, vomiting, sore throat, muscle soreness, damage to my eyes, mouth, or teeth (from breathing tube placement).  Rare risks include: remembering what happened during my procedure, allergic reactions to medications, injury to my airway, heart, lungs, vision, nerves, or  muscles and in extremely rare instances death.  My doctor has explained to me other choices available to me for my care (alternatives).  Pregnant Patients (“epidural”):  I understand that the risks of having an epidural (medicine given into my back to help control pain during labor), include itching, low blood pressure, difficulty urinating, headache or slowing of the baby’s heart. Very rare risks include infection, bleeding, seizure, irregular heart rhythms and nerve injury.  Regional Anesthesia (“spinal”, “epidural”, & “nerve blocks”):  I understand that rare but potential complications include headache, bleeding, infection, seizure, irregular heart rhythms, and nerve injury.    _____________________________________________________________________________  Patient (or Representative) Signature/Relationship to Patient  Date   Time    _____________________________________________________________________________   Name (if used)    Language/Organization   Time    _____________________________________________________________________________  Nurse Anesthetist Signature     Date   Time  _____________________________________________________________________________  Anesthesiologist Signature     Date   Time  I have discussed the procedure and information above with the patient (or patient’s representative) and answered their questions. The patient or their representative has agreed to have anesthesia services.    _____________________________________________________________________________  Witness        Date   Time  I have verified that the signature is that of the patient or patient’s representative, and that it was signed before the procedure  Patient Name: Harini Cuadra     : 1960                 Printed: 2024 at 7:43 AM    Medical Record #: [id number]                                            Page 1 of 1  ----------ANESTHESIA CONSENT----------

## (undated) NOTE — ED AVS SNAPSHOT
Kavin Soriano   MRN: [id number]    Department:  [facility] Emergency Department   Date of Visit:  11/9/2018           Disclosure     Insurance plans vary and the physician(s) referred by the ER may not be covered by your plan.  Please contact y CARE PHYSICIAN AT ONCE OR RETURN IMMEDIATELY TO THE EMERGENCY DEPARTMENT. If you have been prescribed any medication(s), please fill your prescription right away and begin taking the medication(s) as directed.   If you believe that any of the medications

## (undated) NOTE — LETTER
3/22/2018              Hamlet Gomez        [id number]        [place] [address]         Dear Sinan Crook,      It was a pleasure to see you at our [address] office.   Your Pap smear & high risk

## (undated) NOTE — LETTER
[facility]  [address], [place], [place]  Authorization for Surgical Operation and Procedure                                                                                           I hereby authorize Jose Maria Cannon MD, my physician and his/her assistants (if applicable), which may include medical students, residents, and/or fellows, to perform the following surgical operation/ procedure and administer such anesthesia as may be determined necessary by my physician: Operation/Procedure name (s) COLONOSCOPY on Harini Cuadra   2.   I recognize that during the surgical operation/procedure, unforeseen conditions may necessitate additional or different procedures than those listed above.  I, therefore, further authorize and request that the above-named surgeon, assistants, or designees perform such procedures as are, in their judgment, necessary and desirable.    3.   My surgeon/physician has discussed prior to my surgery the potential benefits, risks and side effects of this procedure; the likelihood of achieving goals; and potential problems that might occur during recuperation.  They also discussed reasonable alternatives to the procedure, including risks, benefits, and side effects related to the alternatives and risks related to not receiving this procedure.  I have had all my questions answered and I acknowledge that no guarantee has been made as to the result that may be obtained.    4.   Should the need arise during my operation/procedure, which includes change of level of care prior to discharge, I also consent to the administration of blood and/or blood products.  Further, I understand that despite careful testing and screening of blood or blood products by collecting agencies, I may still be subject to ill effects as a result of receiving a blood transfusion and/or blood products.  The following are some, but not all, of the potential risks that can occur: fever and allergic  reactions, hemolytic reactions, transmission of diseases such as Hepatitis, AIDS and Cytomegalovirus (CMV) and fluid overload.  In the event that I wish to have an autologous transfusion of my own blood, or a directed donor transfusion, I will discuss this with my physician.  Check only if Refusing Blood or Blood Products  I understand refusal of blood or blood products as deemed necessary by my physician may have serious consequences to my condition to include possible death. I hereby assume responsibility for my refusal and release the hospital, its personnel, and my physicians from any responsibility for the consequences of my refusal.    o  Refuse   5.   I authorize the use of any specimen, organs, tissues, body parts or foreign objects that may be removed from my body during the operation/procedure for diagnosis, research or teaching purposes and their subsequent disposal by hospital authorities.  I also authorize the release of specimen test results and/or written reports to my treating physician on the hospital medical staff or other referring or consulting physicians involved in my care, at the discretion of the Pathologist or my treating physician.    6.   I consent to the photographing or videotaping of the operations or procedures to be performed, including appropriate portions of my body for medical, scientific, or educational purposes, provided my identity is not revealed by the pictures or by descriptive texts accompanying them.  If the procedure has been photographed/videotaped, the surgeon will obtain the original picture, image, videotape or CD.  The hospital will not be responsible for storage, release or maintenance of the picture, image, tape or CD.    7.   I consent to the presence of a  or observers in the operating room as deemed necessary by my physician or their designees.    8.   I recognize that in the event my procedure results in extended X-Ray/fluoroscopy time, I may  develop a skin reaction.    9. If I have a Do Not Attempt Resuscitation (DNAR) order in place, that status will be suspended while in the operating room, procedural suite, and during the recovery period unless otherwise explicitly stated by me (or a person authorized to consent on my behalf). The surgeon or my attending physician will determine when the applicable recovery period ends for purposes of reinstating the DNAR order.  10. Patients having a sterilization procedure: I understand that if the procedure is successful the results will be permanent and it will therefore be impossible for me to inseminate, conceive, or bear children.  I also understand that the procedure is intended to result in sterility, although the result has not been guaranteed.   11. I acknowledge that my physician has explained sedation/analgesia administration to me including the risk and benefits I consent to the administration of sedation/analgesia as may be necessary or desirable in the judgment of my physician.    I CERTIFY THAT I HAVE READ AND FULLY UNDERSTAND THE ABOVE CONSENT TO OPERATION and/or OTHER PROCEDURE.     _________________________________________ _________________________________     ___________________________________  Signature of Patient     Signature of Responsible Person                   Printed Name of Responsible Person                              _________________________________________ ______________________________        ___________________________________  Signature of Witness         Date  Time         Relationship to Patient    STATEMENT OF PHYSICIAN My signature below affirms that prior to the time of the procedure; I have explained to the patient and/or his/her legal representative, the risks and benefits involved in the proposed treatment and any reasonable alternative to the proposed treatment. I have also explained the risks and benefits involved in refusal of the proposed treatment and alternatives  to the proposed treatment and have answered the patient's questions. If I have a significant financial interest in a co-management agreement or a significant financial interest in any product or implant, or other significant relationship used in this procedure/surgery, I have disclosed this and had a discussion with my patient.     _______________________________________________________________ _____________________________  (Signature of Physician)                                                                                         (Date)                                   (Time)  Patient Name: Harini Cuadra    : 1960   Printed: 2024      Medical Record #: [id number]                                              Page 1 of 1

## (undated) NOTE — LETTER
01/07/2022              [facility]        [id number]        [place] [address]         Dear Herbert Wharton,    [facility] records indicate that the tests ordered for you by Maynor Winslow MD  have not been done. If you have, in fact, already completed the tests or you do not wish to have the tests done, please contact our office at [address] [place] [address]. Otherwise, please proceed with the testing.     -CT Chest     Sincerely,    Maynor Winslow MD  [place] , [address] [facility], [address] [facility], [address] [place] [address]  [phone]

## (undated) NOTE — LETTER
08/26/21        Michael Raymond [address] [place] [address]      Dear Terra Santana,    [facility] records indicate that you have outstanding lab work and or testing that was ordered for you and has not yet been completed:  Orders Placed This Encounter

## (undated) NOTE — LETTER
AUTHORIZATION FOR SURGICAL OPERATION OR OTHER PROCEDURE    1. I hereby authorize Dr. Mauricio Pineda, and [place] [facility], [facility] staff assigned to my case to perform the following operation and/or procedure at the [facility], [facility]:    ______________ steroid injection near the flexor nodule tendon of the right thumb at the MCP joint.  _________________________________________________________________________________      _______________________________________________________________________________________________    2. My physician has explained the nature and purpose of the operation or other procedure, possible alternative methods of treatment, the risks involved, and the possibility of complication to me. I acknowledge that no guarantee has been made as to the result that may be obtained. 3.  I recognize that, during the course of this operation, or other procedure, unforseen conditions may necessitate additional or different procedure than those listed above. I, therefore, further authorize and request that the above named physician, his/her physician assistants or designees perform such procedures as are, in his/her professional opinion, necessary and desirable. 4.  Any tissue or organs removed in the operation or other procedure may be disposed of by and at the discretion of the [facility], [facility] and [facility]. 5.  I understand that in the event of a medical emergency, I will be transported by local paramedics to [facility] or other hospital emergency department. 6.  I certify that I have read and fully understand the above consent to operation and/or other procedure. 7.  I acknowledge that my physician has explained sedation/analgesia administration to me including the risks and benefits. I consent to the administration of sedation/analgesia as may be necessary or desirable in the judgement of my physician.     Witness signature: ___________________________________________________ Date:  ______/______/_____                    Time:  ________ A. M.  P.M. Patient Name:  ______________________________________________________  (please print)      Patient signature:  ___________________________________________________             Relationship to Patient:           []  Parent    Responsible person                          []  Spouse  In case of minor or                    [] Other  _____________   Incompetent name:  __________________________________________________                               (please print)      _____________      Responsible person  In case of minor or  Incompetent signature:  _______________________________________________    Statement of Physician  My signature below affirms that prior to the time of the procedure, I have explained to the patient and/or his/her guardian, the risks and benefits involved in the proposed treatment and any reasonable alternative to the proposed treatment. I have also explained the risks and benefits involved in the refusal of the proposed treatment and have answered the patient's questions.                         Date:  ______/______/_______  Provider                      Signature:  __________________________________________________________       Time:  ___________ EWA BOUCHER

## (undated) NOTE — LETTER
21      Patient: Nolene Phalen  : 1960 Visit date: 2021    Dear Scott Alvarez,      I examined your patient in consultation today. She has a severe right carpal tunnel syndrome.   We will schedule her for right endoscopic car

## (undated) NOTE — LETTER
AUTHORIZATION FOR SURGICAL OPERATION OR OTHER PROCEDURE    1. I hereby authorize Dr. Dias , and [facility] staff assigned to my case to perform the following operation and/or procedure at the [facility]:    _______________________________________________________________________________________________      _______________________________________________________________________________________________    2.  My physician has explained the nature and purpose of the operation or other procedure, possible alternative methods of treatment, the risks involved, and the possibility of complication to me.  I acknowledge that no guarantee has been made as to the result that may be obtained.  3.  I recognize that, during the course of this operation, or other procedure, unforseen conditions may necessitate additional or different procedure than those listed above.  I, therefore, further authorize and request that the above named physician, his/her physician assistants or designees perform such procedures as are, in his/her professional opinion, necessary and desirable.  4.  Any tissue or organs removed in the operation or other procedure may be disposed of by and at the discretion of the [facility] and [facility].  5.  I understand that in the event of a medical emergency, I will be transported by local paramedics to [facility] or other hospital emergency department.  6.  I certify that I have read and fully understand the above consent to operation and/or other procedure.    7.  I acknowledge that my physician has explained sedation/analgesia administration to me including the risks and benefits.  I consent to the administration of sedation/analgesia as may be necessary or desirable in the judgement of my physician.    Witness signature: ___________________________________________________ Date:  ______/______/_____                    Time:  ________ A.M.  P.M.        Patient Name:  ______________________________________________________  (please print)      Patient signature:  ___________________________________________________             Relationship to Patient:           []  Parent    Responsible person                          []  Spouse  In case of minor or                    [] Other  _____________   Incompetent name:  __________________________________________________                               (please print)      _____________      Responsible person  In case of minor or  Incompetent signature:  _______________________________________________    Statement of Physician  My signature below affirms that prior to the time of the procedure, I have explained to the patient and/or his/her guardian, the risks and benefits involved in the proposed treatment and any reasonable alternative to the proposed treatment.  I have also explained the risks and benefits involved in the refusal of the proposed treatment and have answered the patient's questions.                        Date:  ______/______/_______  Provider                      Signature:  __________________________________________________________       Time:  ___________ A.M    P.M.

## (undated) NOTE — LETTER
9/2/2020    Celi Jewell        [id number] [place] [id number]            Dear Celi Jewell,      Our records indicate that you are due for an appointment for a Colonoscopy with Ariela Goddard MD.    Please call our office to schedule this

## (undated) NOTE — LETTER
No referring provider defined for this encounter. 11/09/18        Patient: Kavin Soriano   YOB: 1960   Date of Visit: 11/9/2018       Dear  Dr. Brittni Claire,      Thank you for referring Kavin Soriano to my practice.   Please find

## (undated) NOTE — LETTER
October 8, 2020    iMley Heart DO  [address] [facility] [address] [id number]     Patient: Celi Jewell   YOB: 1960   Date of Visit: 10/8/2020       Dear Dr. Meme Meredith DO:    Thank you for referring Celi Jewell to me for evalua • Other (Other) Mother         a fib   • Cancer Maternal Grandmother         ?liver   • Cancer Son         malignant melanoma   • Diabetes Neg    • Glaucoma Neg    • Macular degeneration Neg        Social History: Social History    Tobacco Use      Smoking • Potassium (POTASSIMIN OR) Take by mouth. • Multiple Vitamins-Minerals (MULTIVITAMIN ADULT OR) Take by mouth. • ANUCORT-HC 25 MG Rectal Suppos UNW AND I 1 SUP REC BID  1   • Coenzyme Q10 (CO Q 10 OR) Take by mouth.      • Misc Natural Products (GLU Left -2.75 Sphere    Age: 22yrs    Type: Distance only          Manifest Refraction (Auto)       Sphere Cylinder Dist VA Add Near [address] [facility]    Right -1.50 Sphere       Left -1.50 Sphere             Manifest Refraction #2       Sphere Cylinder Dist VA Add Near [address] [facility]

## (undated) NOTE — LETTER
[address], [place]  Authorization for Invasive Procedures  1.  I hereby authorize Dr. Mukul Patricio , my physician and whomever may be designated as the doctor's assistant, to perform the following operation and/or procedure: Ul 4. Should the need arise during my operation or immediate post-operative period; I also consent to the administration of blood and/or blood products.  Further, I understand that despite careful testing and screening of blood and blood products, I may still 9. Patients having a sterilization procedure: I understand that if the procedure is successful the results will be permanent and it will therefore be impossible for me to inseminate, conceive or bear children.  I also understand that the procedure is intend

## (undated) NOTE — Clinical Note
Dear Dr. Callie Gama,    Thank you for sending Katty Condon to see me for electrodiagnostic consultation. I appreciate your confidence in me to care for your patients. Please feel free call me with any questions at [phone] or contact me through [address] [place] [address] Rd.

## (undated) NOTE — LETTER
AUTHORIZATION FOR SURGICAL OPERATION OR OTHER PROCEDURE    1. I hereby authorize Dr. Latoya Lozoya, and [place] [facility] staff assigned to my case to perform the following operation and/or procedure at the [facility] [facility]:    ________________________ trigger finger injection of the right thumb.  _______________________________________________________________________      _______________________________________________________________________________________________    2. My physician has explained the nature and purpose of the operation or other procedure, possible alternative methods of treatment, the risks involved, and the possibility of complication to me. I acknowledge that no guarantee has been made as to the result that may be obtained. 3.  I recognize that, during the course of this operation, or other procedure, unforseen conditions may necessitate additional or different procedure than those listed above. I, therefore, further authorize and request that the above named physician, his/her physician assistants or designees perform such procedures as are, in his/her professional opinion, necessary and desirable. 4.  Any tissue or organs removed in the operation or other procedure may be disposed of by and at the discretion of the [place] [facility] and [facility]. 5.  I understand that in the event of a medical emergency, I will be transported by local paramedics to [facility] or other hospital emergency department. 6.  I certify that I have read and fully understand the above consent to operation and/or other procedure. 7.  I acknowledge that my physician has explained sedation/analgesia administration to me including the risks and benefits. I consent to the administration of sedation/analgesia as may be necessary or desirable in the judgement of my physician.     Witness signature: ___________________________________________________ Date: ______/______/_____                    Time:  ________ A. M.  P.M. Patient Name:  ______________________________________________________  (please print)      Patient signature:  ___________________________________________________             Relationship to Patient:           []  Parent    Responsible person                          []  Spouse  In case of minor or                    [] Other  _____________   Incompetent name:  __________________________________________________                               (please print)      _____________      Responsible person  In case of minor or  Incompetent signature:  _______________________________________________    Statement of Physician  My signature below affirms that prior to the time of the procedure, I have explained to the patient and/or his/her guardian, the risks and benefits involved in the proposed treatment and any reasonable alternative to the proposed treatment. I have also explained the risks and benefits involved in the refusal of the proposed treatment and have answered the patient's questions.                         Date:  ______/______/_______  Provider   2/14/2023     2 pm                    Signature:  __________________________________________________________       Time:  ___________ A.M    P.M.